# Patient Record
Sex: MALE | Race: WHITE | NOT HISPANIC OR LATINO | Employment: FULL TIME | URBAN - NONMETROPOLITAN AREA
[De-identification: names, ages, dates, MRNs, and addresses within clinical notes are randomized per-mention and may not be internally consistent; named-entity substitution may affect disease eponyms.]

---

## 2017-02-07 RX ORDER — INSULIN LISPRO 100 [IU]/ML
INJECTION, SOLUTION INTRAVENOUS; SUBCUTANEOUS
Qty: 30 ML | Refills: 3 | Status: SHIPPED | OUTPATIENT
Start: 2017-02-07 | End: 2017-03-01

## 2017-02-07 RX ORDER — EMPAGLIFLOZIN 10 MG/1
TABLET, FILM COATED ORAL
Qty: 90 TABLET | Refills: 3 | Status: SHIPPED | OUTPATIENT
Start: 2017-02-07 | End: 2018-01-01 | Stop reason: SDUPTHER

## 2017-02-15 RX ORDER — PEN NEEDLE, DIABETIC 31 GX5/16"
NEEDLE, DISPOSABLE MISCELLANEOUS
Qty: 400 EACH | Refills: 11 | Status: SHIPPED | OUTPATIENT
Start: 2017-02-15 | End: 2018-01-01 | Stop reason: SDUPTHER

## 2017-03-01 ENCOUNTER — OFFICE VISIT (OUTPATIENT)
Dept: ENDOCRINOLOGY | Facility: CLINIC | Age: 57
End: 2017-03-01

## 2017-03-01 VITALS
WEIGHT: 315 LBS | HEART RATE: 94 BPM | BODY MASS INDEX: 45.1 KG/M2 | HEIGHT: 70 IN | DIASTOLIC BLOOD PRESSURE: 86 MMHG | SYSTOLIC BLOOD PRESSURE: 142 MMHG

## 2017-03-01 DIAGNOSIS — Z79.4 CONTROLLED TYPE 2 DIABETES MELLITUS WITHOUT COMPLICATION, WITH LONG-TERM CURRENT USE OF INSULIN (HCC): ICD-10-CM

## 2017-03-01 DIAGNOSIS — E55.9 VITAMIN D DEFICIENCY: ICD-10-CM

## 2017-03-01 DIAGNOSIS — E78.49 OTHER HYPERLIPIDEMIA: Primary | ICD-10-CM

## 2017-03-01 DIAGNOSIS — J06.9 UPPER RESPIRATORY TRACT INFECTION, UNSPECIFIED TYPE: ICD-10-CM

## 2017-03-01 DIAGNOSIS — I10 ESSENTIAL HYPERTENSION: ICD-10-CM

## 2017-03-01 DIAGNOSIS — E11.9 CONTROLLED TYPE 2 DIABETES MELLITUS WITHOUT COMPLICATION, WITH LONG-TERM CURRENT USE OF INSULIN (HCC): ICD-10-CM

## 2017-03-01 PROCEDURE — 99214 OFFICE O/P EST MOD 30 MIN: CPT | Performed by: NURSE PRACTITIONER

## 2017-03-01 RX ORDER — AMOXICILLIN 500 MG/1
500 CAPSULE ORAL 2 TIMES DAILY
Qty: 14 CAPSULE | Refills: 0 | Status: SHIPPED | OUTPATIENT
Start: 2017-03-01 | End: 2017-03-08

## 2017-03-01 RX ORDER — BENZONATATE 100 MG/1
100 CAPSULE ORAL 3 TIMES DAILY PRN
Qty: 60 CAPSULE | Refills: 1 | Status: SHIPPED | OUTPATIENT
Start: 2017-03-01 | End: 2018-01-01

## 2017-03-01 NOTE — PROGRESS NOTES
Subjective    Nolvia Carlin is a 56 y.o. male. he is here today for follow-up.    History of Present Illness       Duration/Timing: Diabetes mellitus type 2, Age at onset of diabetes: 39 years, Onset of symptoms gradual  timing - constant     severity - moderate     Quality - not controlled      Severity (Complications/Hospitalizations)  Secondary Macrovascular Complications: No CAD, No CVA, No PAD  Secondary Microvascular Complications: No Diabetic Nephropathy, No proteinuria, No Diabetic Retinopathy, No Diabetic Neuropathy     Context  Diabetes Regimen: Insulin, Oral Medications, Last HgbA1c% 10.7 from Oct. 2016 , Feb. 2017  A1c 9.8%    Blood Glucose Readings      fasting glucose 170 to 200      No low sugars         Diet  trying to eat low carb    Snacking all the time   Exercise: Does not exercise     Associated Signs/Symptoms  Hypoglycemic Episodes: No documented hypoglycemia        The following portions of the patient's history were reviewed and updated as appropriate:   Past Medical History   Diagnosis Date   • Dyslipidemia    • Elevated blood pressure    • Essential hypertension    • Type II diabetes mellitus, uncontrolled      History reviewed. No pertinent past surgical history.  Family History   Problem Relation Age of Onset   • Diabetes Other    • Thyroid disease Other        Current Outpatient Prescriptions   Medication Sig Dispense Refill   • atenolol (TENORMIN) 50 MG tablet Take 50 mg by mouth Daily.     • B-D ULTRAFINE III SHORT PEN 31G X 8 MM misc USE AS INDICATED FOUR TIMES DAILY 400 each 11   • insulin aspart (NOVOLOG FLEXPEN) 100 UNIT/ML solution pen-injector sc pen Inject 40 Units under the skin 3 (Three) Times a Day Before Meals.     • JARDIANCE 10 MG tablet TAKE 1 TABLET BY MOUTH DAILY BEFORE BREAKFAST 90 tablet 3   • lisinopril-hydrochlorothiazide (PRINZIDE,ZESTORETIC) 20-25 MG per tablet Take 1 tablet by mouth Daily.     • metFORMIN (GLUCOPHAGE) 1000 MG tablet TAKE 1 TABLET 2 TIMES PER  DAY 60 tablet 3   • pravastatin (PRAVACHOL) 40 MG tablet Take 1 tablet by mouth Every Evening. 30 tablet 11   • amoxicillin (AMOXIL) 500 MG capsule Take 1 capsule by mouth 2 (Two) Times a Day for 7 days. 14 capsule 0   • benzonatate (TESSALON PERLES) 100 MG capsule Take 1 capsule by mouth 3 (Three) Times a Day As Needed for cough. 60 capsule 1     No current facility-administered medications for this visit.      No Known Allergies  Social History     Social History   • Marital status:      Spouse name: N/A   • Number of children: N/A   • Years of education: N/A     Social History Main Topics   • Smoking status: Former Smoker   • Smokeless tobacco: None   • Alcohol use None   • Drug use: None   • Sexual activity: Not Asked     Other Topics Concern   • None     Social History Narrative       Review of Systems  Review of Systems   Constitutional: Negative for activity change, appetite change, chills, diaphoresis and fatigue.   HENT: Negative for congestion, dental problem, drooling, ear discharge, ear pain, facial swelling, sneezing, sore throat, tinnitus, trouble swallowing and voice change.    Eyes: Negative for photophobia, pain, discharge, redness, itching and visual disturbance.   Respiratory: Negative for apnea, cough, choking, chest tightness and shortness of breath.    Cardiovascular: Negative for chest pain, palpitations and leg swelling.   Gastrointestinal: Negative for abdominal distention, abdominal pain, constipation, diarrhea, nausea and vomiting.   Endocrine: Negative for cold intolerance, heat intolerance, polydipsia, polyphagia and polyuria.   Genitourinary: Negative for difficulty urinating, dysuria, frequency, hematuria and urgency.   Musculoskeletal: Negative for arthralgias, back pain, gait problem, joint swelling, myalgias, neck pain and neck stiffness.   Skin: Negative for color change, pallor, rash and wound.   Allergic/Immunologic: Negative for environmental allergies, food allergies and  "immunocompromised state.   Neurological: Negative for dizziness, tremors, facial asymmetry, weakness, light-headedness, numbness and headaches.   Hematological: Negative for adenopathy. Does not bruise/bleed easily.   Psychiatric/Behavioral: Negative for agitation, behavioral problems, confusion, decreased concentration and sleep disturbance.        Objective      Visit Vitals   • /86 (BP Location: Right arm, Patient Position: Sitting, Cuff Size: Adult)   • Pulse 94   • Ht 70\" (177.8 cm)   • Wt (!) 326 lb (148 kg)   • BMI 46.78 kg/m2     Physical Exam   Constitutional: He is oriented to person, place, and time. He appears well-developed and well-nourished. No distress.   HENT:   Head: Normocephalic and atraumatic.   Right Ear: External ear normal.   Left Ear: External ear normal.   Nose: Nose normal.   Eyes: Conjunctivae and EOM are normal. Pupils are equal, round, and reactive to light.   Neck: Normal range of motion. Neck supple. No tracheal deviation present. No thyromegaly present.   Cardiovascular: Normal rate, regular rhythm and normal heart sounds.    No murmur heard.  Pulmonary/Chest: Effort normal and breath sounds normal. No respiratory distress. He has no wheezes.   Abdominal: Soft. Bowel sounds are normal. There is no tenderness. There is no rebound and no guarding.   Musculoskeletal: Normal range of motion. He exhibits no edema, tenderness or deformity.   Neurological: He is alert and oriented to person, place, and time. No cranial nerve deficit.   Skin: Skin is warm and dry. No rash noted.   Psychiatric: He has a normal mood and affect. His behavior is normal. Judgment and thought content normal.       Lab Review  No results found for: GLUCOSE, NA, K, CL, CO2, BUN, CREATININE, HGBA1C, TRIG, LDL    Assessment/Plan      1. Other hyperlipidemia    2. Controlled type 2 diabetes mellitus without complication, with long-term current use of insulin    3. Vitamin D deficiency    4. Essential hypertension "    5. Upper respiratory tract infection, unspecified type    .    Medications prescribed:  Outpatient Encounter Prescriptions as of 3/1/2017   Medication Sig Dispense Refill   • atenolol (TENORMIN) 50 MG tablet Take 50 mg by mouth Daily.     • B-D ULTRAFINE III SHORT PEN 31G X 8 MM misc USE AS INDICATED FOUR TIMES DAILY 400 each 11   • insulin aspart (NOVOLOG FLEXPEN) 100 UNIT/ML solution pen-injector sc pen Inject 40 Units under the skin 3 (Three) Times a Day Before Meals.     • JARDIANCE 10 MG tablet TAKE 1 TABLET BY MOUTH DAILY BEFORE BREAKFAST 90 tablet 3   • lisinopril-hydrochlorothiazide (PRINZIDE,ZESTORETIC) 20-25 MG per tablet Take 1 tablet by mouth Daily.     • metFORMIN (GLUCOPHAGE) 1000 MG tablet TAKE 1 TABLET 2 TIMES PER DAY 60 tablet 3   • pravastatin (PRAVACHOL) 40 MG tablet Take 1 tablet by mouth Every Evening. 30 tablet 11   • amoxicillin (AMOXIL) 500 MG capsule Take 1 capsule by mouth 2 (Two) Times a Day for 7 days. 14 capsule 0   • benzonatate (TESSALON PERLES) 100 MG capsule Take 1 capsule by mouth 3 (Three) Times a Day As Needed for cough. 60 capsule 1   • [DISCONTINUED] Dulaglutide (TRULICITY) 1.5 MG/0.5ML solution pen-injector Inject 1.5 mg under the skin 1 (One) Time Per Week.     • [DISCONTINUED] gemfibrozil (LOPID) 600 MG tablet Take 600 mg by mouth Daily.     • [DISCONTINUED] glipiZIDE (GLUCOTROL) 10 MG tablet Take 10 mg by mouth 2 (Two) Times a Day Before Meals.     • [DISCONTINUED] HUMALOG KWIKPEN 100 UNIT/ML solution pen-injector INJECT 40 UNITS BEFORE MEALS THREE TIMES DAILY 30 mL 3   • [DISCONTINUED] Insulin Degludec (TRESIBA FLEXTOUCH) 200 UNIT/ML solution pen-injector Inject 60 Units under the skin Every Night. start with 60 units qhs may increase up to 80 units based on blood glucose.     • [DISCONTINUED] insulin detemir (LEVEMIR FLEXTOUCH) 100 UNIT/ML injection Inject 100 Units under the skin Every Night.     • [DISCONTINUED] simvastatin (ZOCOR) 40 MG tablet Take 40 mg by mouth  Every Night.     • [DISCONTINUED] sitaGLIPtin (JANUVIA) 100 MG tablet Take 100 mg by mouth Daily.       No facility-administered encounter medications on file as of 3/1/2017.        Orders placed during this encounter include:  Orders Placed This Encounter   Procedures   • Comprehensive Metabolic Panel   • Hemoglobin A1c   • Vitamin D 25 Hydroxy             Glycemic Management      Trulicity 0.75 mg weekly -- stopped due to nausea, vomiting      Jardiance 10mg one daily--continue     Metformin 1000 mg po BID --continue         Tresiba 60 units ----increase to 65 units ---        --  Novolog change to Humalog per insurance      Taking 40 units -- change to carb counting 5 units per 15 grams of CHO that you eat ---7 units for every 15 grams of CHO      +     sliding scale     3 per 50    Snacking -- cover with 5 units   .  Lipid Management     Oct 2014     LDL 90  Tg 142  HDL 37     contorlled on zocor 40 mg qhs and lopid 600 bid      hold simvastatin      start pravastatin 20 mg at night     Total chol - 179  Tg - 109  HDL - 37  LDL - 109    Not taking pravastatin    Blood Pressure Management    controlled on lisinopril , hctz 20/25 and atenolol 50     Microvascular Complication Monitoring    last eye exam in 2016-- states needs new exam - will schedule on his           no diabetic neuropathy      Preventive Care: Patient is not smoking  Weight Related: Obesity, Sleep apnea  +OESI  Bone Health  8-15 nl vit D 34    Feb. 2017    Vitamin d     31.5      Other Diabetes Related Aspects  8-15     nl TSH         URI     Taking allergy sinus medication    Amoxil 500 mg one po BID             4. Follow-up: Return in about 3 months (around 6/1/2017) for Recheck.

## 2017-03-06 RX ORDER — INSULIN DEGLUDEC 200 U/ML
INJECTION, SOLUTION SUBCUTANEOUS
Qty: 36 ML | Refills: 3 | Status: SHIPPED | OUTPATIENT
Start: 2017-03-06 | End: 2018-01-01 | Stop reason: SDUPTHER

## 2017-08-02 RX ORDER — INSULIN LISPRO 100 [IU]/ML
INJECTION, SOLUTION INTRAVENOUS; SUBCUTANEOUS
Qty: 30 ML | Refills: 3 | Status: SHIPPED | OUTPATIENT
Start: 2017-08-02 | End: 2018-01-03 | Stop reason: SDUPTHER

## 2017-09-08 ENCOUNTER — TELEPHONE (OUTPATIENT)
Dept: ENDOCRINOLOGY | Facility: CLINIC | Age: 57
End: 2017-09-08

## 2017-09-08 NOTE — TELEPHONE ENCOUNTER
Status   Sent to [facility]   DrugTresiba FlexTouch (insulin degludec injection) 200 Units/mL solution   [employer] Electronic PA Form

## 2017-09-12 ENCOUNTER — TELEPHONE (OUTPATIENT)
Dept: ENDOCRINOLOGY | Facility: CLINIC | Age: 57
End: 2017-09-12

## 2017-11-20 ENCOUNTER — OFFICE VISIT (OUTPATIENT)
Dept: ENDOCRINOLOGY | Facility: CLINIC | Age: 57
End: 2017-11-20

## 2017-11-20 VITALS
HEART RATE: 100 BPM | SYSTOLIC BLOOD PRESSURE: 130 MMHG | BODY MASS INDEX: 45.1 KG/M2 | WEIGHT: 315 LBS | HEIGHT: 70 IN | DIASTOLIC BLOOD PRESSURE: 80 MMHG

## 2017-11-20 DIAGNOSIS — E78.49 OTHER HYPERLIPIDEMIA: ICD-10-CM

## 2017-11-20 DIAGNOSIS — I10 ESSENTIAL HYPERTENSION: ICD-10-CM

## 2017-11-20 DIAGNOSIS — Z79.4 CONTROLLED TYPE 2 DIABETES MELLITUS WITHOUT COMPLICATION, WITH LONG-TERM CURRENT USE OF INSULIN (HCC): Primary | ICD-10-CM

## 2017-11-20 DIAGNOSIS — E11.9 CONTROLLED TYPE 2 DIABETES MELLITUS WITHOUT COMPLICATION, WITH LONG-TERM CURRENT USE OF INSULIN (HCC): Primary | ICD-10-CM

## 2017-11-20 DIAGNOSIS — E55.9 VITAMIN D DEFICIENCY: ICD-10-CM

## 2017-11-20 PROCEDURE — 99214 OFFICE O/P EST MOD 30 MIN: CPT | Performed by: NURSE PRACTITIONER

## 2017-11-20 NOTE — PROGRESS NOTES
Subjective    Nolvia Carlin is a 57 y.o. male. he is here today for follow-up.    History of Present Illness     Duration/Timing: Diabetes mellitus type 2, Age at onset of diabetes: 39 years, Onset of symptoms gradual  timing - constant     severity - moderate     Quality - not controlled      Severity (Complications/Hospitalizations)  Secondary Macrovascular Complications: No CAD, No CVA, No PAD  Secondary Microvascular Complications: No Diabetic Nephropathy, No proteinuria, No Diabetic Retinopathy, No Diabetic Neuropathy     Context  Diabetes Regimen: Insulin, Oral Medications, Last HgbA1c% 10.7 from Oct. 2016 , Feb. 2017  A1c 9.8%    Nov. 2017     9.8%      Blood Glucose Readings        fasting glucose 200 or higher     200 s during the day            Diet    trying to eat low carb     Snacking all the time     Now following with dietician   Exercise: Does not exercise     Associated Signs/Symptoms  Hypoglycemic Episodes: No documented hypoglycemia           The following portions of the patient's history were reviewed and updated as appropriate:   Past Medical History:   Diagnosis Date   • Dyslipidemia    • Elevated blood pressure    • Essential hypertension    • Type II diabetes mellitus, uncontrolled      History reviewed. No pertinent surgical history.  Family History   Problem Relation Age of Onset   • Diabetes Other    • Thyroid disease Other        Current Outpatient Prescriptions   Medication Sig Dispense Refill   • atenolol (TENORMIN) 50 MG tablet Take 50 mg by mouth Daily.     • B-D ULTRAFINE III SHORT PEN 31G X 8 MM misc USE AS INDICATED FOUR TIMES DAILY 400 each 11   • benzonatate (TESSALON PERLES) 100 MG capsule Take 1 capsule by mouth 3 (Three) Times a Day As Needed for cough. 60 capsule 1   • HUMALOG KWIKPEN 100 UNIT/ML solution pen-injector INJECT 40 UNITS BEFORE MEALS THREE TIMES DAILY 30 mL 3   • JARDIANCE 10 MG tablet TAKE 1 TABLET BY MOUTH DAILY BEFORE BREAKFAST 90 tablet 3   •  lisinopril-hydrochlorothiazide (PRINZIDE,ZESTORETIC) 20-25 MG per tablet Take 1 tablet by mouth Daily.     • metFORMIN (GLUCOPHAGE) 1000 MG tablet TAKE 1 TABLET BY MOUTH TWICE DAILY 60 tablet 3   • TRESIBA FLEXTOUCH 200 UNIT/ML solution pen-injector START WITH 60 UNITS AT BEDTIME ; MAY INCREASE UP TO 80 UNITS BASED ON BLOOD GLUCOSE 36 mL 3     No current facility-administered medications for this visit.      No Known Allergies  Social History     Social History   • Marital status:      Spouse name: N/A   • Number of children: N/A   • Years of education: N/A     Social History Main Topics   • Smoking status: Former Smoker   • Smokeless tobacco: Never Used   • Alcohol use No   • Drug use: None   • Sexual activity: Not Asked     Other Topics Concern   • None     Social History Narrative       Review of Systems  Review of Systems   Constitutional: Negative for activity change, appetite change, diaphoresis and fatigue.   HENT: Negative for congestion, dental problem, facial swelling, sneezing, sore throat, tinnitus, trouble swallowing and voice change.    Eyes: Negative for photophobia, pain, discharge, redness, itching and visual disturbance.   Respiratory: Negative for apnea, cough, choking, chest tightness and shortness of breath.    Cardiovascular: Negative for chest pain, palpitations and leg swelling.   Gastrointestinal: Negative for abdominal distention, abdominal pain, constipation, diarrhea, nausea and vomiting.   Endocrine: Negative for cold intolerance, heat intolerance, polydipsia, polyphagia and polyuria.   Genitourinary: Negative for difficulty urinating, dysuria, frequency, hematuria and urgency.   Musculoskeletal: Negative for arthralgias, back pain, gait problem, joint swelling, myalgias, neck pain and neck stiffness.   Skin: Negative for color change, pallor, rash and wound.   Allergic/Immunologic: Negative for environmental allergies and food allergies.   Neurological: Negative for dizziness,  "tremors, facial asymmetry, weakness, light-headedness, numbness and headaches.   Hematological: Negative for adenopathy. Does not bruise/bleed easily.   Psychiatric/Behavioral: Negative for agitation, behavioral problems, confusion and sleep disturbance.        Objective    /80 (BP Location: Right arm, Patient Position: Sitting, Cuff Size: Adult)  Pulse 100  Ht 70\" (177.8 cm)  Wt (!) 325 lb (147 kg)  BMI 46.63 kg/m2  Physical Exam   Constitutional: He is oriented to person, place, and time. He appears well-developed and well-nourished. No distress.   HENT:   Head: Normocephalic and atraumatic.   Right Ear: External ear normal.   Left Ear: External ear normal.   Nose: Nose normal.   Eyes: Conjunctivae and EOM are normal. Pupils are equal, round, and reactive to light.   Neck: Normal range of motion. Neck supple. No tracheal deviation present. No thyromegaly present.   Cardiovascular: Normal rate, regular rhythm and normal heart sounds.    No murmur heard.  Pulmonary/Chest: Effort normal and breath sounds normal. No respiratory distress. He has no wheezes.   Abdominal: Soft. Bowel sounds are normal. There is no tenderness. There is no rebound and no guarding.   Musculoskeletal: Normal range of motion. He exhibits no edema, tenderness or deformity.   Neurological: He is alert and oriented to person, place, and time. No cranial nerve deficit.   Skin: Skin is warm and dry. No rash noted.   Psychiatric: He has a normal mood and affect. His behavior is normal. Judgment and thought content normal.       Lab Review  No results found for: GLUCOSE, NA, K, CL, CO2, BUN, CREATININE, HGBA1C, TRIG, LDL    Assessment/Plan      1. Controlled type 2 diabetes mellitus without complication, with long-term current use of insulin    2. Other hyperlipidemia    3. Vitamin D deficiency    4. Essential hypertension    .    Medications prescribed:  Outpatient Encounter Prescriptions as of 11/20/2017   Medication Sig Dispense Refill "   • atenolol (TENORMIN) 50 MG tablet Take 50 mg by mouth Daily.     • B-D ULTRAFINE III SHORT PEN 31G X 8 MM misc USE AS INDICATED FOUR TIMES DAILY 400 each 11   • benzonatate (TESSALON PERLES) 100 MG capsule Take 1 capsule by mouth 3 (Three) Times a Day As Needed for cough. 60 capsule 1   • HUMALOG KWIKPEN 100 UNIT/ML solution pen-injector INJECT 40 UNITS BEFORE MEALS THREE TIMES DAILY 30 mL 3   • JARDIANCE 10 MG tablet TAKE 1 TABLET BY MOUTH DAILY BEFORE BREAKFAST 90 tablet 3   • lisinopril-hydrochlorothiazide (PRINZIDE,ZESTORETIC) 20-25 MG per tablet Take 1 tablet by mouth Daily.     • metFORMIN (GLUCOPHAGE) 1000 MG tablet TAKE 1 TABLET BY MOUTH TWICE DAILY 60 tablet 3   • TRESIBA FLEXTOUCH 200 UNIT/ML solution pen-injector START WITH 60 UNITS AT BEDTIME ; MAY INCREASE UP TO 80 UNITS BASED ON BLOOD GLUCOSE 36 mL 3   • [DISCONTINUED] insulin aspart (NOVOLOG FLEXPEN) 100 UNIT/ML solution pen-injector sc pen Inject 40 Units under the skin 3 (Three) Times a Day Before Meals.       No facility-administered encounter medications on file as of 11/20/2017.        Orders placed during this encounter include:  No orders of the defined types were placed in this encounter.    Glycemic Management      last HgbA1c 9.8% from November 2017       Trulicity 0.75 mg weekly -- stopped due to nausea, vomiting      Jardiance 10mg one daily--continue     Metformin 1000 mg po BID --continue         Tresiba --  65 units - 75 units         --  Novolog change to Humalog per insurance      Taking 40 units -- change to carb counting 5 units per 15 grams of CHO that you eat ---7 units for every 15 grams of CHO -- 8 units per 15 grams of CHO      +     sliding scale     3 per 50     Snacking -- cover with 5 units     Lipid Management      Oct 2014     LDL 90  Tg 142  HDL 37     contorlled on zocor 40 mg qhs and lopid 600 bid      hold simvastatin      start pravastatin 20 mg at night      Total chol - 179  Tg - 109  HDL - 37  LDL - 109     Not  taking pravastatin     Blood Pressure Management     controlled on lisinopril , hctz 20/25 and atenolol 50      Microvascular Complication Monitoring     last eye exam in 2016-- states needs new exam - will schedule on his            no diabetic neuropathy        Preventive Care:       Patient is not smoking  Weight Related: Obesity, Sleep apnea  +OSEI      Bone Health      8-15 nl vit D 34     Feb. 2017     Vitamin d      31.5        Other Diabetes Related Aspects  8-15     nl TSH                    4. Follow-up: Return in about 3 months (around 2/20/2018) for Recheck.

## 2018-01-01 ENCOUNTER — TELEPHONE (OUTPATIENT)
Dept: ENDOCRINOLOGY | Facility: CLINIC | Age: 58
End: 2018-01-01

## 2018-01-01 ENCOUNTER — TELEPHONE (OUTPATIENT)
Dept: FAMILY MEDICINE CLINIC | Facility: CLINIC | Age: 58
End: 2018-01-01

## 2018-01-01 ENCOUNTER — OFFICE VISIT (OUTPATIENT)
Dept: ENDOCRINOLOGY | Facility: CLINIC | Age: 58
End: 2018-01-01

## 2018-01-01 ENCOUNTER — APPOINTMENT (OUTPATIENT)
Dept: LAB | Facility: HOSPITAL | Age: 58
End: 2018-01-01

## 2018-01-01 VITALS
HEIGHT: 70 IN | WEIGHT: 315 LBS | SYSTOLIC BLOOD PRESSURE: 124 MMHG | DIASTOLIC BLOOD PRESSURE: 60 MMHG | HEART RATE: 121 BPM | BODY MASS INDEX: 45.1 KG/M2

## 2018-01-01 DIAGNOSIS — I10 ESSENTIAL HYPERTENSION: ICD-10-CM

## 2018-01-01 DIAGNOSIS — E55.9 VITAMIN D DEFICIENCY: ICD-10-CM

## 2018-01-01 DIAGNOSIS — E78.49 OTHER HYPERLIPIDEMIA: ICD-10-CM

## 2018-01-01 DIAGNOSIS — E11.9 CONTROLLED TYPE 2 DIABETES MELLITUS WITHOUT COMPLICATION, WITH LONG-TERM CURRENT USE OF INSULIN (HCC): Primary | ICD-10-CM

## 2018-01-01 DIAGNOSIS — Z79.4 CONTROLLED TYPE 2 DIABETES MELLITUS WITHOUT COMPLICATION, WITH LONG-TERM CURRENT USE OF INSULIN (HCC): Primary | ICD-10-CM

## 2018-01-01 LAB
25(OH)D3 SERPL-MCNC: 34.5 NG/ML (ref 30–100)
ALBUMIN SERPL-MCNC: 4.4 G/DL (ref 3.4–4.8)
ALBUMIN UR-MCNC: <0.6 MG/L
ALBUMIN/GLOB SERPL: 1.1 G/DL (ref 1.1–1.8)
ALP SERPL-CCNC: 110 U/L (ref 38–126)
ALT SERPL W P-5'-P-CCNC: 56 U/L (ref 21–72)
ANION GAP SERPL CALCULATED.3IONS-SCNC: 9 MMOL/L (ref 5–15)
ARTICHOKE IGE QN: 174 MG/DL (ref 1–129)
AST SERPL-CCNC: 35 U/L (ref 17–59)
BASOPHILS # BLD AUTO: 0.02 10*3/MM3 (ref 0–0.2)
BASOPHILS NFR BLD AUTO: 0.3 % (ref 0–2)
BILIRUB SERPL-MCNC: 0.6 MG/DL (ref 0.2–1.3)
BUN BLD-MCNC: 14 MG/DL (ref 7–21)
BUN/CREAT SERPL: 18.4 (ref 7–25)
CALCIUM SPEC-SCNC: 10.1 MG/DL (ref 8.4–10.2)
CHLORIDE SERPL-SCNC: 100 MMOL/L (ref 95–110)
CHOLEST SERPL-MCNC: 267 MG/DL (ref 0–199)
CO2 SERPL-SCNC: 31 MMOL/L (ref 22–31)
CREAT BLD-MCNC: 0.76 MG/DL (ref 0.7–1.3)
CREAT UR-MCNC: 64.7 MG/DL
CREAT UR-MCNC: 64.7 MG/DL
DEPRECATED RDW RBC AUTO: 44.3 FL (ref 35.1–43.9)
EOSINOPHIL # BLD AUTO: 0.13 10*3/MM3 (ref 0–0.7)
EOSINOPHIL NFR BLD AUTO: 2 % (ref 0–7)
ERYTHROCYTE [DISTWIDTH] IN BLOOD BY AUTOMATED COUNT: 14 % (ref 11.5–14.5)
GFR SERPL CREATININE-BSD FRML MDRD: 105 ML/MIN/1.73 (ref 56–130)
GLOBULIN UR ELPH-MCNC: 4 GM/DL (ref 2.3–3.5)
GLUCOSE BLD-MCNC: 163 MG/DL (ref 60–100)
HBA1C MFR BLD: 11.1 % (ref 4–5.6)
HCT VFR BLD AUTO: 47.9 % (ref 39–49)
HDLC SERPL-MCNC: 48 MG/DL (ref 60–200)
HGB BLD-MCNC: 16.2 G/DL (ref 13.7–17.3)
IMM GRANULOCYTES # BLD: 0.01 10*3/MM3 (ref 0–0.02)
IMM GRANULOCYTES NFR BLD: 0.2 % (ref 0–0.5)
LDLC/HDLC SERPL: 3.46 {RATIO} (ref 0–3.55)
LYMPHOCYTES # BLD AUTO: 2.35 10*3/MM3 (ref 0.6–4.2)
LYMPHOCYTES NFR BLD AUTO: 36.1 % (ref 10–50)
MCH RBC QN AUTO: 29.5 PG (ref 26.5–34)
MCHC RBC AUTO-ENTMCNC: 33.8 G/DL (ref 31.5–36.3)
MCV RBC AUTO: 87.2 FL (ref 80–98)
MICROALBUMIN/CREAT UR: NORMAL MG/G (ref 0–30)
MONOCYTES # BLD AUTO: 0.36 10*3/MM3 (ref 0–0.9)
MONOCYTES NFR BLD AUTO: 5.5 % (ref 0–12)
NEUTROPHILS # BLD AUTO: 3.64 10*3/MM3 (ref 2–8.6)
NEUTROPHILS NFR BLD AUTO: 55.9 % (ref 37–80)
PLATELET # BLD AUTO: 170 10*3/MM3 (ref 150–450)
PMV BLD AUTO: 9.7 FL (ref 8–12)
POTASSIUM BLD-SCNC: 3.9 MMOL/L (ref 3.5–5.1)
PROT SERPL-MCNC: 8.4 G/DL (ref 6.3–8.6)
PROT UR-MCNC: 9.3 MG/DL
PROT/CREAT UR: 143.7 MG/G CREA (ref 0–200)
RBC # BLD AUTO: 5.49 10*6/MM3 (ref 4.37–5.74)
SODIUM BLD-SCNC: 140 MMOL/L (ref 137–145)
TRIGL SERPL-MCNC: 264 MG/DL (ref 20–199)
TSH SERPL DL<=0.05 MIU/L-ACNC: 1.84 MIU/ML (ref 0.46–4.68)
VIT B12 BLD-MCNC: 667 PG/ML (ref 239–931)
WBC NRBC COR # BLD: 6.51 10*3/MM3 (ref 3.2–9.8)

## 2018-01-01 PROCEDURE — 82306 VITAMIN D 25 HYDROXY: CPT | Performed by: NURSE PRACTITIONER

## 2018-01-01 PROCEDURE — 83036 HEMOGLOBIN GLYCOSYLATED A1C: CPT | Performed by: NURSE PRACTITIONER

## 2018-01-01 PROCEDURE — 82043 UR ALBUMIN QUANTITATIVE: CPT | Performed by: NURSE PRACTITIONER

## 2018-01-01 PROCEDURE — 99214 OFFICE O/P EST MOD 30 MIN: CPT | Performed by: NURSE PRACTITIONER

## 2018-01-01 PROCEDURE — 80061 LIPID PANEL: CPT | Performed by: NURSE PRACTITIONER

## 2018-01-01 PROCEDURE — 82570 ASSAY OF URINE CREATININE: CPT | Performed by: NURSE PRACTITIONER

## 2018-01-01 PROCEDURE — 80053 COMPREHEN METABOLIC PANEL: CPT | Performed by: NURSE PRACTITIONER

## 2018-01-01 PROCEDURE — 84443 ASSAY THYROID STIM HORMONE: CPT | Performed by: NURSE PRACTITIONER

## 2018-01-01 PROCEDURE — 85025 COMPLETE CBC W/AUTO DIFF WBC: CPT | Performed by: NURSE PRACTITIONER

## 2018-01-01 PROCEDURE — 36415 COLL VENOUS BLD VENIPUNCTURE: CPT | Performed by: NURSE PRACTITIONER

## 2018-01-01 PROCEDURE — 82607 VITAMIN B-12: CPT | Performed by: NURSE PRACTITIONER

## 2018-01-01 PROCEDURE — 95250 CONT GLUC MNTR PHYS/QHP EQP: CPT | Performed by: NURSE PRACTITIONER

## 2018-01-01 PROCEDURE — 84156 ASSAY OF PROTEIN URINE: CPT | Performed by: NURSE PRACTITIONER

## 2018-01-01 RX ORDER — INSULIN LISPRO 100 [IU]/ML
INJECTION, SOLUTION INTRAVENOUS; SUBCUTANEOUS
Qty: 30 ML | Refills: 3 | Status: SHIPPED | OUTPATIENT
Start: 2018-01-01 | End: 2018-01-01 | Stop reason: SDUPTHER

## 2018-01-01 RX ORDER — INSULIN DEGLUDEC 200 U/ML
INJECTION, SOLUTION SUBCUTANEOUS
Qty: 36 ML | Refills: 3 | Status: SHIPPED | OUTPATIENT
Start: 2018-01-01 | End: 2018-01-01 | Stop reason: SDUPTHER

## 2018-01-01 RX ORDER — PEN NEEDLE, DIABETIC 31 GX5/16"
NEEDLE, DISPOSABLE MISCELLANEOUS
Qty: 150 EACH | Refills: 11 | Status: SHIPPED | OUTPATIENT
Start: 2018-01-01 | End: 2018-01-01 | Stop reason: SDUPTHER

## 2018-01-01 RX ORDER — EMPAGLIFLOZIN 10 MG/1
TABLET, FILM COATED ORAL
Qty: 90 TABLET | Refills: 3 | Status: SHIPPED | OUTPATIENT
Start: 2018-01-01 | End: 2018-01-01 | Stop reason: SDUPTHER

## 2018-01-01 RX ORDER — ROSUVASTATIN CALCIUM 5 MG/1
5 TABLET, COATED ORAL NIGHTLY
Qty: 30 TABLET | Refills: 5 | Status: ON HOLD | OUTPATIENT
Start: 2018-01-01 | End: 2019-01-01

## 2018-01-03 RX ORDER — INSULIN LISPRO 100 [IU]/ML
INJECTION, SOLUTION INTRAVENOUS; SUBCUTANEOUS
Qty: 30 ML | Refills: 3 | Status: SHIPPED | OUTPATIENT
Start: 2018-01-03 | End: 2018-01-01 | Stop reason: SDUPTHER

## 2018-10-29 NOTE — TELEPHONE ENCOUNTER
----- Message from LUIS E Connors sent at 10/29/2018  3:32 PM CDT -----  A1c was 11.1 so average is 275 increase the mealtime like we discussed today; b12, vitamin d and thyroid normal; no excessive protein spillage in the urine; bad cholesterol is 174 should be 70 or less does he think he can tolerate a different cholesterol pill?

## 2018-10-29 NOTE — PROGRESS NOTES
Subjective    Nolvia Carlin is a 58 y.o. male. he is here today for follow-up.    History of Present Illness     Duration/Timing: Diabetes mellitus type 2, Age at onset of diabetes: 39 years, Onset of symptoms gradual  timing - constant     severity - moderate     Quality - not controlled      Severity (Complications/Hospitalizations)  Secondary Macrovascular Complications: No CAD, No CVA, No PAD  Secondary Microvascular Complications: No Diabetic Nephropathy, No proteinuria, No Diabetic Retinopathy, No Diabetic Neuropathy     Context  Diabetes Regimen: Insulin, Oral Medications     Nov. 2017      9.8%      Blood Glucose Readings        Am fasting in 200    During the day 170 up to 200     No low            Diet     trying to eat low carb     Snacking all the time      Now following with dietician   Exercise: Does not exercise     Associated Signs/Symptoms  Hypoglycemic Episodes: No documented hypoglycemia            The following portions of the patient's history were reviewed and updated as appropriate:   Past Medical History:   Diagnosis Date   • Dyslipidemia    • Elevated blood pressure    • Essential hypertension    • Type II diabetes mellitus, uncontrolled (CMS/[facility])      History reviewed. No pertinent surgical history.  Family History   Problem Relation Age of Onset   • Diabetes Other    • Thyroid disease Other        Current Outpatient Prescriptions   Medication Sig Dispense Refill   • Empagliflozin (JARDIANCE) 10 MG tablet Take 10 mg by mouth Daily. 30 tablet 11   • Insulin Degludec (TRESIBA FLEXTOUCH) 200 UNIT/ML solution pen-injector Inject 80 Units under the skin into the appropriate area as directed Daily. 8 pen 11   • Insulin Lispro (HUMALOG KWIKPEN) 100 UNIT/ML solution pen-injector Inject 40 Units under the skin into the appropriate area as directed 3 (Three) Times a Day. 4 pen 11   • Insulin Pen Needle (B-D ULTRAFINE III SHORT PEN) 31G X 8 MM misc Inject 4 times daily 150 each 11   •  lisinopril-hydrochlorothiazide (PRINZIDE,ZESTORETIC) 20-25 MG per tablet Take 1 tablet by mouth Daily.     • metFORMIN (GLUCOPHAGE) 1000 MG tablet Take 1 tablet by mouth 2 (Two) Times a Day. 60 tablet 11   • atenolol (TENORMIN) 50 MG tablet Take 50 mg by mouth Daily.       No current facility-administered medications for this visit.      No Known Allergies  Social History     Social History   • Marital status:      Social History Main Topics   • Smoking status: Former Smoker   • Smokeless tobacco: Never Used   • Alcohol use No   • Drug use: Unknown     Other Topics Concern   • Not on file       Review of Systems  Review of Systems   Constitutional: Negative for activity change, appetite change, diaphoresis and fatigue.   HENT: Negative for facial swelling, sneezing, sore throat, tinnitus, trouble swallowing and voice change.    Eyes: Negative for photophobia, pain, discharge, redness, itching and visual disturbance.   Respiratory: Negative for apnea, cough, choking, chest tightness and shortness of breath.    Cardiovascular: Negative for chest pain, palpitations and leg swelling.   Gastrointestinal: Negative for abdominal distention, abdominal pain, constipation, diarrhea, nausea and vomiting.   Endocrine: Negative for cold intolerance, heat intolerance, polydipsia, polyphagia and polyuria.   Genitourinary: Negative for difficulty urinating, dysuria, frequency, hematuria and urgency.   Musculoskeletal: Negative for arthralgias, back pain, gait problem, joint swelling, myalgias, neck pain and neck stiffness.   Skin: Negative for color change, pallor, rash and wound.   Neurological: Negative for dizziness, tremors, weakness, light-headedness, numbness and headaches.   Hematological: Negative for adenopathy. Does not bruise/bleed easily.   Psychiatric/Behavioral: Negative for behavioral problems, confusion and sleep disturbance.        Objective    /60 (BP Location: Right arm, Patient Position: Sitting,  "Cuff Size: Adult)   Pulse (!) 121   Ht 177.8 cm (70\")   Wt (!) 149 kg (329 lb)   BMI 47.21 kg/m²   Physical Exam   Constitutional: He is oriented to person, place, and time. He appears well-developed and well-nourished. No distress.   HENT:   Head: Normocephalic and atraumatic.   Right Ear: External ear normal.   Left Ear: External ear normal.   Nose: Nose normal.   Eyes: Pupils are equal, round, and reactive to light. Conjunctivae and EOM are normal.   Neck: Normal range of motion. Neck supple. No tracheal deviation present. No thyromegaly present.   Cardiovascular: Normal rate, regular rhythm and normal heart sounds.    No murmur heard.  Pulmonary/Chest: Effort normal and breath sounds normal. No respiratory distress. He has no wheezes.   Abdominal: Soft. Bowel sounds are normal. There is no tenderness. There is no rebound and no guarding.   Musculoskeletal: Normal range of motion. He exhibits no edema, tenderness or deformity.   Neurological: He is alert and oriented to person, place, and time. No cranial nerve deficit.   Skin: Skin is warm and dry. No rash noted.   Psychiatric: He has a normal mood and affect. His behavior is normal. Judgment and thought content normal.       Lab Review  No results found for: GLUCOSE, NA, K, CL, CO2, BUN, CREATININE, HGBA1C, TRIG, LDL    Assessment/Plan      1. Controlled type 2 diabetes mellitus without complication, with long-term current use of insulin (CMS/[facility])    2. Other hyperlipidemia    3. Vitamin D deficiency    4. Essential hypertension    .    Medications prescribed:  Outpatient Encounter Prescriptions as of 10/29/2018   Medication Sig Dispense Refill   • Empagliflozin (JARDIANCE) 10 MG tablet Take 10 mg by mouth Daily. 30 tablet 11   • Insulin Degludec (TRESIBA FLEXTOUCH) 200 UNIT/ML solution pen-injector Inject 80 Units under the skin into the appropriate area as directed Daily. 8 pen 11   • Insulin Lispro (HUMALOG KWIKPEN) 100 UNIT/ML solution pen-injector " Inject 40 Units under the skin into the appropriate area as directed 3 (Three) Times a Day. 4 pen 11   • Insulin Pen Needle (B-D ULTRAFINE III SHORT PEN) 31G X 8 MM misc Inject 4 times daily 150 each 11   • lisinopril-hydrochlorothiazide (PRINZIDE,ZESTORETIC) 20-25 MG per tablet Take 1 tablet by mouth Daily.     • metFORMIN (GLUCOPHAGE) 1000 MG tablet Take 1 tablet by mouth 2 (Two) Times a Day. 60 tablet 11   • [DISCONTINUED] B-D ULTRAFINE III SHORT PEN 31G X 8 MM misc USE AS INDICATED FOUR TIMES DAILY 150 each 11   • [DISCONTINUED] HUMALOG KWIKPEN 100 UNIT/ML solution pen-injector INJECT 40 UNITS BEFORE MEALS THREE TIMES DAILY 30 mL 3   • [DISCONTINUED] JARDIANCE 10 MG tablet TAKE 1 TABLET BY MOUTH DAILY BEFORE BREAKFAST 90 tablet 3   • [DISCONTINUED] metFORMIN (GLUCOPHAGE) 1000 MG tablet TAKE 1 TABLET BY MOUTH TWICE DAILY 60 tablet 0   • [DISCONTINUED] TRESIBA FLEXTOUCH 200 UNIT/ML solution pen-injector START WITH 60 UNITS AT BEDTIME ; MAY INCREASE UP TO 80 UNITS BASED ON BLOOD GLUCOSE 36 mL 3   • atenolol (TENORMIN) 50 MG tablet Take 50 mg by mouth Daily.       No facility-administered encounter medications on file as of 10/29/2018.        Orders placed during this encounter include:  Orders Placed This Encounter   Procedures   • Comprehensive Metabolic Panel   • Hemoglobin A1c   • Vitamin D 25 Hydroxy   • TSH   • Vitamin B12   • Protein / Creatinine Ratio, Urine - Urine, Clean Catch   • Microalbumin / Creatinine Urine Ratio - Urine, Clean Catch   • Lipid Panel   • CBC & Differential     Order Specific Question:   Manual Differential     Answer:   No     Glycemic Management      last HgbA1c 9.8% from November 2017         Trulicity 0.75 mg weekly -- stopped due to nausea, vomiting      Jardiance 10mg one daily--continue     Metformin 1000 mg po BID --continue         Tresiba -- 80 units         --   Humalog     Taking 15 units -- increase to 20 units     Add 5 units for higher carb meal      +     sliding scale      3 per 50     Snacking -- cover with 5 units           Uncontrolled diabetes  Hyperglycemia    Insertion of continuous glucose monitor to define patter    The continuous glucose monitor that was inserted is a suzi glucose monitor     Lipid Management      States cannot take statins -- pravastatin, zocor, or lipid -- caused sever leg pain      Total chol - 179  Tg - 109  HDL - 37  LDL - 109          Blood Pressure Management      lisinopril     HCTZ       Off atenolol         Microvascular Complication Monitoring     last eye exam in 2017-- states needs new exam - will schedule on his            no diabetic neuropathy        Preventive Care:         Patient is not smoking  Weight Related: Obesity, Sleep apnea  +OSEI        Bone Health        8-15 nl vit D 34     Feb. 2017     Vitamin d      31.5        Other Diabetes Related Aspects  8-15     nl TSH         4. Follow-up: Return in about 3 months (around 1/29/2019) for Recheck.

## 2018-10-30 NOTE — TELEPHONE ENCOUNTER
----- Message from LUIS E Connors sent at 10/30/2018  7:55 AM CDT -----  Try crestor 5mg and let me know if he cannot tolerate it

## 2018-11-06 NOTE — TELEPHONE ENCOUNTER
----- Message from LUIS E Connors sent at 11/6/2018  1:54 PM CST -----  Let him know the 3 days it recorded showed an average of 171; there were no low events -- not enough information to make changes

## 2018-11-21 NOTE — TELEPHONE ENCOUNTER
HEIDI WHEELER (Key: [id number])   Rx #: [id number]   Tresiba FlexTouch (insulin degludec injection) 200 Units/mL solution   Form  [place] [employer] Form   Created   6 hours ago   Sent to Plan   1 minute ago   Plan Response   1 minute ago   Submit Clinical Questions   now   Determination   Favorable   now   Message from Plan  Questionnaire submitted. PA Case [id number] Status: Approved. Authorization and Notifications Completed.

## 2018-12-10 NOTE — TELEPHONE ENCOUNTER
He called and said that Jamshid increased his humolog and now he is running out of it too soon- about 4 -5 days early and ins doesn't want to pay for it . He didn't know what he needs to do -can be reached at [phone]

## 2018-12-10 NOTE — TELEPHONE ENCOUNTER
He called and said that Jamshid increased his humolog and now he is running out of it too soon- about 4 -5 days early and ins doesn't want to pay for it . He didn't know what he needs to do -can be reached at [phone]

## 2019-01-01 ENCOUNTER — APPOINTMENT (OUTPATIENT)
Dept: GENERAL RADIOLOGY | Facility: HOSPITAL | Age: 59
End: 2019-01-01

## 2019-01-01 ENCOUNTER — APPOINTMENT (OUTPATIENT)
Dept: CT IMAGING | Facility: HOSPITAL | Age: 59
End: 2019-01-01

## 2019-01-01 ENCOUNTER — HOSPITAL ENCOUNTER (INPATIENT)
Facility: HOSPITAL | Age: 59
LOS: 10 days | End: 2019-01-25
Attending: FAMILY MEDICINE | Admitting: INTERNAL MEDICINE

## 2019-01-01 VITALS
TEMPERATURE: 99.3 F | SYSTOLIC BLOOD PRESSURE: 125 MMHG | BODY MASS INDEX: 46.65 KG/M2 | DIASTOLIC BLOOD PRESSURE: 59 MMHG | RESPIRATION RATE: 23 BRPM | HEIGHT: 69 IN | OXYGEN SATURATION: 81 % | WEIGHT: 315 LBS

## 2019-01-01 DIAGNOSIS — A41.9 SHOCK, SEPTIC (HCC): Primary | ICD-10-CM

## 2019-01-01 DIAGNOSIS — R65.21 SHOCK, SEPTIC (HCC): Primary | ICD-10-CM

## 2019-01-01 DIAGNOSIS — Z74.09 IMPAIRED MOBILITY: ICD-10-CM

## 2019-01-01 LAB
ALBUMIN FLD-MCNC: 2.2 G/DL
ALBUMIN SERPL-MCNC: 2.3 G/DL (ref 3.5–5)
ALBUMIN SERPL-MCNC: 2.5 G/DL (ref 3.5–5)
ALBUMIN SERPL-MCNC: 2.6 G/DL (ref 3.5–5)
ALBUMIN SERPL-MCNC: 2.7 G/DL (ref 3.5–5)
ALBUMIN SERPL-MCNC: 2.7 G/DL (ref 3.5–5)
ALBUMIN SERPL-MCNC: 2.8 G/DL (ref 3.5–5)
ALBUMIN SERPL-MCNC: 3.4 G/DL (ref 3.5–5)
ALBUMIN SERPL-MCNC: 3.4 G/DL (ref 3.5–5)
ALBUMIN/GLOB SERPL: 0.7 G/DL (ref 1.1–2.5)
ALBUMIN/GLOB SERPL: 0.8 G/DL (ref 1.1–2.5)
ALBUMIN/GLOB SERPL: 0.9 G/DL (ref 1.1–2.5)
ALP SERPL-CCNC: 105 U/L (ref 24–120)
ALP SERPL-CCNC: 115 U/L (ref 24–120)
ALP SERPL-CCNC: 116 U/L (ref 24–120)
ALP SERPL-CCNC: 74 U/L (ref 24–120)
ALP SERPL-CCNC: 76 U/L (ref 24–120)
ALP SERPL-CCNC: 77 U/L (ref 24–120)
ALT SERPL W P-5'-P-CCNC: 24 U/L (ref 0–54)
ALT SERPL W P-5'-P-CCNC: 27 U/L (ref 0–54)
ALT SERPL W P-5'-P-CCNC: 28 U/L (ref 0–54)
ALT SERPL W P-5'-P-CCNC: 30 U/L (ref 0–54)
ALT SERPL W P-5'-P-CCNC: 32 U/L (ref 0–54)
ALT SERPL W P-5'-P-CCNC: 34 U/L (ref 0–54)
ANION GAP SERPL CALCULATED.3IONS-SCNC: 10 MMOL/L (ref 4–13)
ANION GAP SERPL CALCULATED.3IONS-SCNC: 10 MMOL/L (ref 4–13)
ANION GAP SERPL CALCULATED.3IONS-SCNC: 13 MMOL/L (ref 4–13)
ANION GAP SERPL CALCULATED.3IONS-SCNC: 13 MMOL/L (ref 4–13)
ANION GAP SERPL CALCULATED.3IONS-SCNC: 5 MMOL/L (ref 4–13)
ANION GAP SERPL CALCULATED.3IONS-SCNC: 5 MMOL/L (ref 4–13)
ANION GAP SERPL CALCULATED.3IONS-SCNC: 6 MMOL/L (ref 4–13)
ANION GAP SERPL CALCULATED.3IONS-SCNC: 6 MMOL/L (ref 4–13)
ANION GAP SERPL CALCULATED.3IONS-SCNC: 7 MMOL/L (ref 4–13)
ANION GAP SERPL CALCULATED.3IONS-SCNC: 9 MMOL/L (ref 4–13)
APPEARANCE FLD: ABNORMAL
APTT PPP: 39.2 SECONDS (ref 24.1–34.8)
ARTERIAL PATENCY WRIST A: ABNORMAL
ARTERIAL PATENCY WRIST A: POSITIVE
AST SERPL-CCNC: 26 U/L (ref 7–45)
AST SERPL-CCNC: 31 U/L (ref 7–45)
AST SERPL-CCNC: 35 U/L (ref 7–45)
AST SERPL-CCNC: 42 U/L (ref 7–45)
AST SERPL-CCNC: 42 U/L (ref 7–45)
AST SERPL-CCNC: 52 U/L (ref 7–45)
ATMOSPHERIC PRESS: 740 MMHG
ATMOSPHERIC PRESS: 742 MMHG
ATMOSPHERIC PRESS: 745 MMHG
ATMOSPHERIC PRESS: 746 MMHG
ATMOSPHERIC PRESS: 746 MMHG
ATMOSPHERIC PRESS: 750 MMHG
ATMOSPHERIC PRESS: 752 MMHG
ATMOSPHERIC PRESS: 754 MMHG
ATMOSPHERIC PRESS: 754 MMHG
ATMOSPHERIC PRESS: 755 MMHG
ATMOSPHERIC PRESS: 756 MMHG
ATMOSPHERIC PRESS: 757 MMHG
ATMOSPHERIC PRESS: 758 MMHG
ATMOSPHERIC PRESS: 758 MMHG
ATMOSPHERIC PRESS: 759 MMHG
ATMOSPHERIC PRESS: 761 MMHG
BACTERIA FLD CULT: NORMAL
BACTERIA SPEC AEROBE CULT: NORMAL
BACTERIA SPEC AEROBE CULT: NORMAL
BACTERIA SPEC ANAEROBE CULT: NORMAL
BACTERIA SPEC RESP CULT: ABNORMAL
BACTERIA SPEC RESP CULT: ABNORMAL
BACTERIA UR QL AUTO: ABNORMAL /HPF
BASE EXCESS BLDA CALC-SCNC: 2.7 MMOL/L (ref 0–2)
BASE EXCESS BLDA CALC-SCNC: 4.2 MMOL/L (ref 0–2)
BASE EXCESS BLDA CALC-SCNC: 4.3 MMOL/L (ref 0–2)
BASE EXCESS BLDA CALC-SCNC: 4.6 MMOL/L (ref 0–2)
BASE EXCESS BLDA CALC-SCNC: 4.6 MMOL/L (ref 0–2)
BASE EXCESS BLDA CALC-SCNC: 4.7 MMOL/L (ref 0–2)
BASE EXCESS BLDA CALC-SCNC: 5.5 MMOL/L (ref 0–2)
BASE EXCESS BLDA CALC-SCNC: 5.6 MMOL/L (ref 0–2)
BASE EXCESS BLDA CALC-SCNC: 5.6 MMOL/L (ref 0–2)
BASE EXCESS BLDA CALC-SCNC: 5.7 MMOL/L (ref 0–2)
BASE EXCESS BLDA CALC-SCNC: 6 MMOL/L (ref 0–2)
BASE EXCESS BLDA CALC-SCNC: 6.2 MMOL/L (ref 0–2)
BASE EXCESS BLDA CALC-SCNC: 6.5 MMOL/L (ref 0–2)
BASE EXCESS BLDA CALC-SCNC: 7.2 MMOL/L (ref 0–2)
BASE EXCESS BLDA CALC-SCNC: 7.8 MMOL/L (ref 0–2)
BASE EXCESS BLDA CALC-SCNC: 8 MMOL/L (ref 0–2)
BASE EXCESS BLDA CALC-SCNC: 8.4 MMOL/L (ref 0–2)
BASE EXCESS BLDA CALC-SCNC: 8.9 MMOL/L (ref 0–2)
BASO STIPL COARSE BLD QL SMEAR: ABNORMAL
BASOPHILS # BLD AUTO: 0.14 10*3/MM3 (ref 0–0.2)
BASOPHILS NFR BLD AUTO: 0.6 % (ref 0–2)
BDY SITE: ABNORMAL
BILIRUB SERPL-MCNC: 0.3 MG/DL (ref 0.1–1)
BILIRUB SERPL-MCNC: 0.4 MG/DL (ref 0.1–1)
BILIRUB SERPL-MCNC: 0.4 MG/DL (ref 0.1–1)
BILIRUB SERPL-MCNC: 0.5 MG/DL (ref 0.1–1)
BILIRUB SERPL-MCNC: 0.5 MG/DL (ref 0.1–1)
BILIRUB SERPL-MCNC: 0.7 MG/DL (ref 0.1–1)
BILIRUB UR QL STRIP: NEGATIVE
BODY TEMPERATURE: 37 C
BUN BLD-MCNC: 31 MG/DL (ref 5–21)
BUN BLD-MCNC: 41 MG/DL (ref 5–21)
BUN BLD-MCNC: 56 MG/DL (ref 5–21)
BUN BLD-MCNC: 63 MG/DL (ref 5–21)
BUN BLD-MCNC: 66 MG/DL (ref 5–21)
BUN BLD-MCNC: 67 MG/DL (ref 5–21)
BUN BLD-MCNC: 67 MG/DL (ref 5–21)
BUN BLD-MCNC: 70 MG/DL (ref 5–21)
BUN BLD-MCNC: 72 MG/DL (ref 5–21)
BUN BLD-MCNC: 75 MG/DL (ref 5–21)
BUN BLD-MCNC: 76 MG/DL (ref 5–21)
BUN BLD-MCNC: 86 MG/DL (ref 5–21)
BUN/CREAT SERPL: 37.4 (ref 7–25)
BUN/CREAT SERPL: 42.5 (ref 7–25)
BUN/CREAT SERPL: 47.9 (ref 7–25)
BUN/CREAT SERPL: 50.9 (ref 7–25)
BUN/CREAT SERPL: 54.7 (ref 7–25)
BUN/CREAT SERPL: 63.6 (ref 7–25)
BUN/CREAT SERPL: 68.8 (ref 7–25)
BUN/CREAT SERPL: 71 (ref 7–25)
BUN/CREAT SERPL: 72 (ref 7–25)
BUN/CREAT SERPL: 74.1 (ref 7–25)
BUN/CREAT SERPL: 75 (ref 7–25)
BUN/CREAT SERPL: 82.5 (ref 7–25)
CALCIUM SPEC-SCNC: 8.5 MG/DL (ref 8.4–10.4)
CALCIUM SPEC-SCNC: 8.6 MG/DL (ref 8.4–10.4)
CALCIUM SPEC-SCNC: 8.6 MG/DL (ref 8.4–10.4)
CALCIUM SPEC-SCNC: 8.8 MG/DL (ref 8.4–10.4)
CALCIUM SPEC-SCNC: 8.9 MG/DL (ref 8.4–10.4)
CALCIUM SPEC-SCNC: 9.1 MG/DL (ref 8.4–10.4)
CALCIUM SPEC-SCNC: 9.1 MG/DL (ref 8.4–10.4)
CHLORIDE SERPL-SCNC: 101 MMOL/L (ref 98–110)
CHLORIDE SERPL-SCNC: 102 MMOL/L (ref 98–110)
CHLORIDE SERPL-SCNC: 102 MMOL/L (ref 98–110)
CHLORIDE SERPL-SCNC: 104 MMOL/L (ref 98–110)
CHLORIDE SERPL-SCNC: 105 MMOL/L (ref 98–110)
CHLORIDE SERPL-SCNC: 105 MMOL/L (ref 98–110)
CHLORIDE SERPL-SCNC: 107 MMOL/L (ref 98–110)
CHLORIDE SERPL-SCNC: 107 MMOL/L (ref 98–110)
CHLORIDE SERPL-SCNC: 89 MMOL/L (ref 98–110)
CHLORIDE SERPL-SCNC: 91 MMOL/L (ref 98–110)
CHLORIDE SERPL-SCNC: 98 MMOL/L (ref 98–110)
CHLORIDE SERPL-SCNC: 99 MMOL/L (ref 98–110)
CLARITY UR: CLEAR
CLUMPED PLATELETS: PRESENT
CO2 SERPL-SCNC: 30 MMOL/L (ref 24–31)
CO2 SERPL-SCNC: 31 MMOL/L (ref 24–31)
CO2 SERPL-SCNC: 31 MMOL/L (ref 24–31)
CO2 SERPL-SCNC: 32 MMOL/L (ref 24–31)
CO2 SERPL-SCNC: 32 MMOL/L (ref 24–31)
CO2 SERPL-SCNC: 33 MMOL/L (ref 24–31)
COLOR FLD: YELLOW
COLOR UR: YELLOW
CREAT BLD-MCNC: 0.73 MG/DL (ref 0.5–1.4)
CREAT BLD-MCNC: 0.75 MG/DL (ref 0.5–1.4)
CREAT BLD-MCNC: 0.8 MG/DL (ref 0.5–1.4)
CREAT BLD-MCNC: 0.93 MG/DL (ref 0.5–1.4)
CREAT BLD-MCNC: 0.96 MG/DL (ref 0.5–1.4)
CREAT BLD-MCNC: 0.99 MG/DL (ref 0.5–1.4)
CREAT BLD-MCNC: 1.07 MG/DL (ref 0.5–1.4)
CREAT BLD-MCNC: 1.09 MG/DL (ref 0.5–1.4)
CREAT BLD-MCNC: 1.1 MG/DL (ref 0.5–1.4)
CREAT BLD-MCNC: 1.16 MG/DL (ref 0.5–1.4)
CREAT BLD-MCNC: 1.4 MG/DL (ref 0.5–1.4)
CREAT BLD-MCNC: 1.87 MG/DL (ref 0.5–1.4)
CREAT UR-MCNC: 52.2 MG/DL
CYTO UR: NORMAL
D-LACTATE SERPL-SCNC: 1.9 MMOL/L (ref 0.5–2)
D-LACTATE SERPL-SCNC: 2 MMOL/L (ref 0.5–2)
DEPRECATED RDW RBC AUTO: 44.5 FL (ref 40–54)
DEPRECATED RDW RBC AUTO: 46.5 FL (ref 40–54)
DEPRECATED RDW RBC AUTO: 46.5 FL (ref 40–54)
DEPRECATED RDW RBC AUTO: 46.8 FL (ref 40–54)
DEPRECATED RDW RBC AUTO: 46.8 FL (ref 40–54)
DEPRECATED RDW RBC AUTO: 47.1 FL (ref 40–54)
DEPRECATED RDW RBC AUTO: 47.7 FL (ref 40–54)
DEPRECATED RDW RBC AUTO: 47.8 FL (ref 40–54)
DEPRECATED RDW RBC AUTO: 48 FL (ref 40–54)
DEPRECATED RDW RBC AUTO: 48.2 FL (ref 40–54)
DEPRECATED RDW RBC AUTO: 48.9 FL (ref 40–54)
DOHLE BODIES: PRESENT
EOSINOPHIL # BLD AUTO: 0 10*3/MM3 (ref 0–0.7)
EOSINOPHIL # BLD MANUAL: 0.8 10*3/MM3 (ref 0–0.7)
EOSINOPHIL NFR BLD AUTO: 0 % (ref 0–4)
EOSINOPHIL NFR BLD MANUAL: 5 % (ref 0–4)
EOSINOPHIL NFR FLD MANUAL: 3 %
ERYTHROCYTE [DISTWIDTH] IN BLOOD BY AUTOMATED COUNT: 14.4 % (ref 12–15)
ERYTHROCYTE [DISTWIDTH] IN BLOOD BY AUTOMATED COUNT: 14.5 % (ref 12–15)
ERYTHROCYTE [DISTWIDTH] IN BLOOD BY AUTOMATED COUNT: 14.6 % (ref 12–15)
ERYTHROCYTE [DISTWIDTH] IN BLOOD BY AUTOMATED COUNT: 14.7 % (ref 12–15)
ERYTHROCYTE [DISTWIDTH] IN BLOOD BY AUTOMATED COUNT: 14.8 % (ref 12–15)
ERYTHROCYTE [DISTWIDTH] IN BLOOD BY AUTOMATED COUNT: 14.8 % (ref 12–15)
GFR SERPL CREATININE-BSD FRML MDRD: 107 ML/MIN/1.73
GFR SERPL CREATININE-BSD FRML MDRD: 110 ML/MIN/1.73
GFR SERPL CREATININE-BSD FRML MDRD: 37 ML/MIN/1.73
GFR SERPL CREATININE-BSD FRML MDRD: 52 ML/MIN/1.73
GFR SERPL CREATININE-BSD FRML MDRD: 64 ML/MIN/1.73
GFR SERPL CREATININE-BSD FRML MDRD: 69 ML/MIN/1.73
GFR SERPL CREATININE-BSD FRML MDRD: 69 ML/MIN/1.73
GFR SERPL CREATININE-BSD FRML MDRD: 71 ML/MIN/1.73
GFR SERPL CREATININE-BSD FRML MDRD: 77 ML/MIN/1.73
GFR SERPL CREATININE-BSD FRML MDRD: 80 ML/MIN/1.73
GFR SERPL CREATININE-BSD FRML MDRD: 83 ML/MIN/1.73
GFR SERPL CREATININE-BSD FRML MDRD: 99 ML/MIN/1.73
GIANT PLATELETS: ABNORMAL
GLOBULIN UR ELPH-MCNC: 2.8 GM/DL
GLOBULIN UR ELPH-MCNC: 2.9 GM/DL
GLOBULIN UR ELPH-MCNC: 3.1 GM/DL
GLOBULIN UR ELPH-MCNC: 3.6 GM/DL
GLOBULIN UR ELPH-MCNC: 3.6 GM/DL
GLOBULIN UR ELPH-MCNC: 3.8 GM/DL
GLUCOSE BLD-MCNC: 132 MG/DL (ref 70–100)
GLUCOSE BLD-MCNC: 139 MG/DL (ref 70–100)
GLUCOSE BLD-MCNC: 159 MG/DL (ref 70–100)
GLUCOSE BLD-MCNC: 160 MG/DL (ref 70–100)
GLUCOSE BLD-MCNC: 169 MG/DL (ref 70–100)
GLUCOSE BLD-MCNC: 173 MG/DL (ref 70–100)
GLUCOSE BLD-MCNC: 306 MG/DL (ref 70–100)
GLUCOSE BLD-MCNC: 348 MG/DL (ref 70–100)
GLUCOSE BLD-MCNC: 363 MG/DL (ref 70–100)
GLUCOSE BLD-MCNC: 414 MG/DL (ref 70–100)
GLUCOSE BLD-MCNC: 495 MG/DL (ref 70–100)
GLUCOSE BLD-MCNC: 99 MG/DL (ref 70–100)
GLUCOSE BLDC GLUCOMTR-MCNC: 102 MG/DL (ref 70–130)
GLUCOSE BLDC GLUCOMTR-MCNC: 106 MG/DL (ref 70–130)
GLUCOSE BLDC GLUCOMTR-MCNC: 107 MG/DL (ref 70–130)
GLUCOSE BLDC GLUCOMTR-MCNC: 109 MG/DL (ref 70–130)
GLUCOSE BLDC GLUCOMTR-MCNC: 109 MG/DL (ref 70–130)
GLUCOSE BLDC GLUCOMTR-MCNC: 111 MG/DL (ref 70–130)
GLUCOSE BLDC GLUCOMTR-MCNC: 112 MG/DL (ref 70–130)
GLUCOSE BLDC GLUCOMTR-MCNC: 113 MG/DL (ref 70–130)
GLUCOSE BLDC GLUCOMTR-MCNC: 114 MG/DL (ref 70–130)
GLUCOSE BLDC GLUCOMTR-MCNC: 116 MG/DL (ref 70–130)
GLUCOSE BLDC GLUCOMTR-MCNC: 117 MG/DL (ref 70–130)
GLUCOSE BLDC GLUCOMTR-MCNC: 123 MG/DL (ref 70–130)
GLUCOSE BLDC GLUCOMTR-MCNC: 123 MG/DL (ref 70–130)
GLUCOSE BLDC GLUCOMTR-MCNC: 124 MG/DL (ref 70–130)
GLUCOSE BLDC GLUCOMTR-MCNC: 124 MG/DL (ref 70–130)
GLUCOSE BLDC GLUCOMTR-MCNC: 125 MG/DL (ref 70–130)
GLUCOSE BLDC GLUCOMTR-MCNC: 125 MG/DL (ref 70–130)
GLUCOSE BLDC GLUCOMTR-MCNC: 126 MG/DL (ref 70–130)
GLUCOSE BLDC GLUCOMTR-MCNC: 127 MG/DL (ref 70–130)
GLUCOSE BLDC GLUCOMTR-MCNC: 127 MG/DL (ref 70–130)
GLUCOSE BLDC GLUCOMTR-MCNC: 128 MG/DL (ref 70–130)
GLUCOSE BLDC GLUCOMTR-MCNC: 129 MG/DL (ref 70–130)
GLUCOSE BLDC GLUCOMTR-MCNC: 129 MG/DL (ref 70–130)
GLUCOSE BLDC GLUCOMTR-MCNC: 131 MG/DL (ref 70–130)
GLUCOSE BLDC GLUCOMTR-MCNC: 134 MG/DL (ref 70–130)
GLUCOSE BLDC GLUCOMTR-MCNC: 135 MG/DL (ref 70–130)
GLUCOSE BLDC GLUCOMTR-MCNC: 136 MG/DL (ref 70–130)
GLUCOSE BLDC GLUCOMTR-MCNC: 138 MG/DL (ref 70–130)
GLUCOSE BLDC GLUCOMTR-MCNC: 138 MG/DL (ref 70–130)
GLUCOSE BLDC GLUCOMTR-MCNC: 139 MG/DL (ref 70–130)
GLUCOSE BLDC GLUCOMTR-MCNC: 143 MG/DL (ref 70–130)
GLUCOSE BLDC GLUCOMTR-MCNC: 144 MG/DL (ref 70–130)
GLUCOSE BLDC GLUCOMTR-MCNC: 145 MG/DL (ref 70–130)
GLUCOSE BLDC GLUCOMTR-MCNC: 145 MG/DL (ref 70–130)
GLUCOSE BLDC GLUCOMTR-MCNC: 146 MG/DL (ref 70–130)
GLUCOSE BLDC GLUCOMTR-MCNC: 146 MG/DL (ref 70–130)
GLUCOSE BLDC GLUCOMTR-MCNC: 148 MG/DL (ref 70–130)
GLUCOSE BLDC GLUCOMTR-MCNC: 149 MG/DL (ref 70–130)
GLUCOSE BLDC GLUCOMTR-MCNC: 149 MG/DL (ref 70–130)
GLUCOSE BLDC GLUCOMTR-MCNC: 151 MG/DL (ref 70–130)
GLUCOSE BLDC GLUCOMTR-MCNC: 152 MG/DL (ref 70–130)
GLUCOSE BLDC GLUCOMTR-MCNC: 152 MG/DL (ref 70–130)
GLUCOSE BLDC GLUCOMTR-MCNC: 153 MG/DL (ref 70–130)
GLUCOSE BLDC GLUCOMTR-MCNC: 153 MG/DL (ref 70–130)
GLUCOSE BLDC GLUCOMTR-MCNC: 154 MG/DL (ref 70–130)
GLUCOSE BLDC GLUCOMTR-MCNC: 155 MG/DL (ref 70–130)
GLUCOSE BLDC GLUCOMTR-MCNC: 156 MG/DL (ref 70–130)
GLUCOSE BLDC GLUCOMTR-MCNC: 157 MG/DL (ref 70–130)
GLUCOSE BLDC GLUCOMTR-MCNC: 158 MG/DL (ref 70–130)
GLUCOSE BLDC GLUCOMTR-MCNC: 159 MG/DL (ref 70–130)
GLUCOSE BLDC GLUCOMTR-MCNC: 162 MG/DL (ref 70–130)
GLUCOSE BLDC GLUCOMTR-MCNC: 162 MG/DL (ref 70–130)
GLUCOSE BLDC GLUCOMTR-MCNC: 164 MG/DL (ref 70–130)
GLUCOSE BLDC GLUCOMTR-MCNC: 164 MG/DL (ref 70–130)
GLUCOSE BLDC GLUCOMTR-MCNC: 165 MG/DL (ref 70–130)
GLUCOSE BLDC GLUCOMTR-MCNC: 166 MG/DL (ref 70–130)
GLUCOSE BLDC GLUCOMTR-MCNC: 167 MG/DL (ref 70–130)
GLUCOSE BLDC GLUCOMTR-MCNC: 167 MG/DL (ref 70–130)
GLUCOSE BLDC GLUCOMTR-MCNC: 168 MG/DL (ref 70–130)
GLUCOSE BLDC GLUCOMTR-MCNC: 168 MG/DL (ref 70–130)
GLUCOSE BLDC GLUCOMTR-MCNC: 171 MG/DL (ref 70–130)
GLUCOSE BLDC GLUCOMTR-MCNC: 171 MG/DL (ref 70–130)
GLUCOSE BLDC GLUCOMTR-MCNC: 173 MG/DL (ref 70–130)
GLUCOSE BLDC GLUCOMTR-MCNC: 173 MG/DL (ref 70–130)
GLUCOSE BLDC GLUCOMTR-MCNC: 176 MG/DL (ref 70–130)
GLUCOSE BLDC GLUCOMTR-MCNC: 177 MG/DL (ref 70–130)
GLUCOSE BLDC GLUCOMTR-MCNC: 178 MG/DL (ref 70–130)
GLUCOSE BLDC GLUCOMTR-MCNC: 178 MG/DL (ref 70–130)
GLUCOSE BLDC GLUCOMTR-MCNC: 181 MG/DL (ref 70–130)
GLUCOSE BLDC GLUCOMTR-MCNC: 182 MG/DL (ref 70–130)
GLUCOSE BLDC GLUCOMTR-MCNC: 184 MG/DL (ref 70–130)
GLUCOSE BLDC GLUCOMTR-MCNC: 185 MG/DL (ref 70–130)
GLUCOSE BLDC GLUCOMTR-MCNC: 186 MG/DL (ref 70–130)
GLUCOSE BLDC GLUCOMTR-MCNC: 186 MG/DL (ref 70–130)
GLUCOSE BLDC GLUCOMTR-MCNC: 187 MG/DL (ref 70–130)
GLUCOSE BLDC GLUCOMTR-MCNC: 190 MG/DL (ref 70–130)
GLUCOSE BLDC GLUCOMTR-MCNC: 193 MG/DL (ref 70–130)
GLUCOSE BLDC GLUCOMTR-MCNC: 196 MG/DL (ref 70–130)
GLUCOSE BLDC GLUCOMTR-MCNC: 196 MG/DL (ref 70–130)
GLUCOSE BLDC GLUCOMTR-MCNC: 199 MG/DL (ref 70–130)
GLUCOSE BLDC GLUCOMTR-MCNC: 201 MG/DL (ref 70–130)
GLUCOSE BLDC GLUCOMTR-MCNC: 206 MG/DL (ref 70–130)
GLUCOSE BLDC GLUCOMTR-MCNC: 211 MG/DL (ref 70–130)
GLUCOSE BLDC GLUCOMTR-MCNC: 215 MG/DL (ref 70–130)
GLUCOSE BLDC GLUCOMTR-MCNC: 222 MG/DL (ref 70–130)
GLUCOSE BLDC GLUCOMTR-MCNC: 226 MG/DL (ref 70–130)
GLUCOSE BLDC GLUCOMTR-MCNC: 227 MG/DL (ref 70–130)
GLUCOSE BLDC GLUCOMTR-MCNC: 230 MG/DL (ref 70–130)
GLUCOSE BLDC GLUCOMTR-MCNC: 236 MG/DL (ref 70–130)
GLUCOSE BLDC GLUCOMTR-MCNC: 250 MG/DL (ref 70–130)
GLUCOSE BLDC GLUCOMTR-MCNC: 266 MG/DL (ref 70–130)
GLUCOSE BLDC GLUCOMTR-MCNC: 278 MG/DL (ref 70–130)
GLUCOSE BLDC GLUCOMTR-MCNC: 279 MG/DL (ref 70–130)
GLUCOSE BLDC GLUCOMTR-MCNC: 287 MG/DL (ref 70–130)
GLUCOSE BLDC GLUCOMTR-MCNC: 291 MG/DL (ref 70–130)
GLUCOSE BLDC GLUCOMTR-MCNC: 316 MG/DL (ref 70–130)
GLUCOSE BLDC GLUCOMTR-MCNC: 316 MG/DL (ref 70–130)
GLUCOSE BLDC GLUCOMTR-MCNC: 317 MG/DL (ref 70–130)
GLUCOSE BLDC GLUCOMTR-MCNC: 322 MG/DL (ref 70–130)
GLUCOSE BLDC GLUCOMTR-MCNC: 327 MG/DL (ref 70–130)
GLUCOSE BLDC GLUCOMTR-MCNC: 353 MG/DL (ref 70–130)
GLUCOSE BLDC GLUCOMTR-MCNC: 370 MG/DL (ref 70–130)
GLUCOSE BLDC GLUCOMTR-MCNC: 374 MG/DL (ref 70–130)
GLUCOSE BLDC GLUCOMTR-MCNC: 72 MG/DL (ref 70–130)
GLUCOSE BLDC GLUCOMTR-MCNC: 86 MG/DL (ref 70–130)
GLUCOSE BLDC GLUCOMTR-MCNC: 86 MG/DL (ref 70–130)
GLUCOSE BLDC GLUCOMTR-MCNC: 87 MG/DL (ref 70–130)
GLUCOSE BLDC GLUCOMTR-MCNC: 89 MG/DL (ref 70–130)
GLUCOSE BLDC GLUCOMTR-MCNC: 93 MG/DL (ref 70–130)
GLUCOSE BLDC GLUCOMTR-MCNC: 94 MG/DL (ref 70–130)
GLUCOSE BLDC GLUCOMTR-MCNC: 95 MG/DL (ref 70–130)
GLUCOSE BLDC GLUCOMTR-MCNC: 97 MG/DL (ref 70–130)
GLUCOSE BLDC GLUCOMTR-MCNC: 98 MG/DL (ref 70–130)
GLUCOSE BLDC GLUCOMTR-MCNC: 99 MG/DL (ref 70–130)
GLUCOSE FLD-MCNC: 199 MG/DL
GLUCOSE UR STRIP-MCNC: ABNORMAL MG/DL
GRAM STN SPEC: ABNORMAL
GRAM STN SPEC: NORMAL
GRAM STN SPEC: NORMAL
HBA1C MFR BLD: 7.4 %
HCO3 BLDA-SCNC: 29.2 MMOL/L (ref 20–26)
HCO3 BLDA-SCNC: 29.2 MMOL/L (ref 20–26)
HCO3 BLDA-SCNC: 29.9 MMOL/L (ref 20–26)
HCO3 BLDA-SCNC: 30 MMOL/L (ref 20–26)
HCO3 BLDA-SCNC: 30.7 MMOL/L (ref 20–26)
HCO3 BLDA-SCNC: 30.8 MMOL/L (ref 20–26)
HCO3 BLDA-SCNC: 30.9 MMOL/L (ref 20–26)
HCO3 BLDA-SCNC: 31 MMOL/L (ref 20–26)
HCO3 BLDA-SCNC: 31.1 MMOL/L (ref 20–26)
HCO3 BLDA-SCNC: 31.5 MMOL/L (ref 20–26)
HCO3 BLDA-SCNC: 31.6 MMOL/L (ref 20–26)
HCO3 BLDA-SCNC: 32.2 MMOL/L (ref 20–26)
HCO3 BLDA-SCNC: 33 MMOL/L (ref 20–26)
HCO3 BLDA-SCNC: 33.4 MMOL/L (ref 20–26)
HCO3 BLDA-SCNC: 33.8 MMOL/L (ref 20–26)
HCO3 BLDA-SCNC: 33.9 MMOL/L (ref 20–26)
HCO3 BLDA-SCNC: 34.3 MMOL/L (ref 20–26)
HCO3 BLDA-SCNC: 34.4 MMOL/L (ref 20–26)
HCT VFR BLD AUTO: 35.5 % (ref 40–52)
HCT VFR BLD AUTO: 36.5 % (ref 40–52)
HCT VFR BLD AUTO: 37.1 % (ref 40–52)
HCT VFR BLD AUTO: 37.9 % (ref 40–52)
HCT VFR BLD AUTO: 38 % (ref 40–52)
HCT VFR BLD AUTO: 38.1 % (ref 40–52)
HCT VFR BLD AUTO: 38.2 % (ref 40–52)
HCT VFR BLD AUTO: 38.7 % (ref 40–52)
HCT VFR BLD AUTO: 38.8 % (ref 40–52)
HCT VFR BLD AUTO: 38.8 % (ref 40–52)
HCT VFR BLD AUTO: 40.7 % (ref 40–52)
HGB BLD-MCNC: 11.3 G/DL (ref 14–18)
HGB BLD-MCNC: 11.5 G/DL (ref 14–18)
HGB BLD-MCNC: 11.6 G/DL (ref 14–18)
HGB BLD-MCNC: 11.7 G/DL (ref 14–18)
HGB BLD-MCNC: 11.9 G/DL (ref 14–18)
HGB BLD-MCNC: 11.9 G/DL (ref 14–18)
HGB BLD-MCNC: 12.1 G/DL (ref 14–18)
HGB BLD-MCNC: 12.2 G/DL (ref 14–18)
HGB BLD-MCNC: 12.2 G/DL (ref 14–18)
HGB BLD-MCNC: 12.4 G/DL (ref 14–18)
HGB BLD-MCNC: 12.6 G/DL (ref 14–18)
HGB UR QL STRIP.AUTO: ABNORMAL
HOROWITZ INDEX BLD+IHG-RTO: 100 %
HOROWITZ INDEX BLD+IHG-RTO: 60 %
HOROWITZ INDEX BLD+IHG-RTO: 60 %
HOROWITZ INDEX BLD+IHG-RTO: 70 %
HOROWITZ INDEX BLD+IHG-RTO: 80 %
HOROWITZ INDEX BLD+IHG-RTO: 90 %
HYALINE CASTS UR QL AUTO: ABNORMAL /LPF
IMM GRANULOCYTES # BLD AUTO: 0.94 10*3/MM3 (ref 0–0.03)
IMM GRANULOCYTES NFR BLD AUTO: 4 % (ref 0–5)
INR PPP: 1.41 (ref 0.91–1.09)
INR PPP: 1.68 (ref 0.91–1.09)
KETONES UR QL STRIP: NEGATIVE
L PNEUMO1 AG UR QL IA: NEGATIVE
LAB AP CASE REPORT: NORMAL
LDH FLD-CCNC: 2673 IU/L
LEUKOCYTE ESTERASE UR QL STRIP.AUTO: NEGATIVE
LYMPHOCYTES # BLD AUTO: 1.6 10*3/MM3 (ref 0.72–4.86)
LYMPHOCYTES # BLD MANUAL: 0.73 10*3/MM3 (ref 0.72–4.86)
LYMPHOCYTES # BLD MANUAL: 1.11 10*3/MM3 (ref 0.72–4.86)
LYMPHOCYTES # BLD MANUAL: 1.25 10*3/MM3 (ref 0.72–4.86)
LYMPHOCYTES # BLD MANUAL: 1.62 10*3/MM3 (ref 0.72–4.86)
LYMPHOCYTES NFR BLD AUTO: 6.8 % (ref 15–45)
LYMPHOCYTES NFR BLD MANUAL: 1.9 % (ref 4–12)
LYMPHOCYTES NFR BLD MANUAL: 2 % (ref 4–12)
LYMPHOCYTES NFR BLD MANUAL: 2.9 % (ref 15–45)
LYMPHOCYTES NFR BLD MANUAL: 4 % (ref 4–12)
LYMPHOCYTES NFR BLD MANUAL: 4 % (ref 4–12)
LYMPHOCYTES NFR BLD MANUAL: 5 % (ref 15–45)
LYMPHOCYTES NFR BLD MANUAL: 6.9 % (ref 15–45)
LYMPHOCYTES NFR BLD MANUAL: 7 % (ref 15–45)
LYMPHOCYTES NFR FLD MANUAL: 8 %
Lab: ABNORMAL
MAGNESIUM SERPL-MCNC: 2.3 MG/DL (ref 1.4–2.2)
MAGNESIUM SERPL-MCNC: 2.5 MG/DL (ref 1.4–2.2)
MAGNESIUM SERPL-MCNC: 2.8 MG/DL (ref 1.4–2.2)
MCH RBC QN AUTO: 26.6 PG (ref 28–32)
MCH RBC QN AUTO: 27 PG (ref 28–32)
MCH RBC QN AUTO: 27.3 PG (ref 28–32)
MCH RBC QN AUTO: 27.4 PG (ref 28–32)
MCH RBC QN AUTO: 27.5 PG (ref 28–32)
MCH RBC QN AUTO: 27.7 PG (ref 28–32)
MCH RBC QN AUTO: 27.9 PG (ref 28–32)
MCHC RBC AUTO-ENTMCNC: 30.5 G/DL (ref 33–36)
MCHC RBC AUTO-ENTMCNC: 30.6 G/DL (ref 33–36)
MCHC RBC AUTO-ENTMCNC: 30.7 G/DL (ref 33–36)
MCHC RBC AUTO-ENTMCNC: 31 G/DL (ref 33–36)
MCHC RBC AUTO-ENTMCNC: 31.4 G/DL (ref 33–36)
MCHC RBC AUTO-ENTMCNC: 31.5 G/DL (ref 33–36)
MCHC RBC AUTO-ENTMCNC: 31.5 G/DL (ref 33–36)
MCHC RBC AUTO-ENTMCNC: 31.8 G/DL (ref 33–36)
MCHC RBC AUTO-ENTMCNC: 31.9 G/DL (ref 33–36)
MCHC RBC AUTO-ENTMCNC: 32.1 G/DL (ref 33–36)
MCHC RBC AUTO-ENTMCNC: 32.5 G/DL (ref 33–36)
MCV RBC AUTO: 84.5 FL (ref 82–95)
MCV RBC AUTO: 85.7 FL (ref 82–95)
MCV RBC AUTO: 86.6 FL (ref 82–95)
MCV RBC AUTO: 86.7 FL (ref 82–95)
MCV RBC AUTO: 87.2 FL (ref 82–95)
MCV RBC AUTO: 87.3 FL (ref 82–95)
MCV RBC AUTO: 87.7 FL (ref 82–95)
MCV RBC AUTO: 88.2 FL (ref 82–95)
MCV RBC AUTO: 88.6 FL (ref 82–95)
MCV RBC AUTO: 89.5 FL (ref 82–95)
MCV RBC AUTO: 89.8 FL (ref 82–95)
METAMYELOCYTES NFR BLD MANUAL: 2 % (ref 0–0)
METAMYELOCYTES NFR BLD MANUAL: 2 % (ref 0–0)
MODALITY: ABNORMAL
MONOCYTES # BLD AUTO: 0.32 10*3/MM3 (ref 0.19–1.3)
MONOCYTES # BLD AUTO: 0.48 10*3/MM3 (ref 0.19–1.3)
MONOCYTES # BLD AUTO: 0.72 10*3/MM3 (ref 0.19–1.3)
MONOCYTES # BLD AUTO: 1.17 10*3/MM3 (ref 0.19–1.3)
MONOCYTES # BLD AUTO: 1.29 10*3/MM3 (ref 0.19–1.3)
MONOCYTES NFR BLD AUTO: 4.9 % (ref 4–12)
MONOCYTES NFR FLD: 5 %
MRSA SPEC QL CULT: NORMAL
MYELOCYTES NFR BLD MANUAL: 2 % (ref 0–0)
MYELOCYTES NFR BLD MANUAL: 3 % (ref 0–0)
NEUTROPHILS # BLD AUTO: 13.01 10*3/MM3 (ref 1.87–8.4)
NEUTROPHILS # BLD AUTO: 15.38 10*3/MM3 (ref 1.87–8.4)
NEUTROPHILS # BLD AUTO: 19.84 10*3/MM3 (ref 1.87–8.4)
NEUTROPHILS # BLD AUTO: 23.72 10*3/MM3 (ref 1.87–8.4)
NEUTROPHILS # BLD AUTO: 28.81 10*3/MM3 (ref 1.87–8.4)
NEUTROPHILS NFR BLD AUTO: 83.7 % (ref 39–78)
NEUTROPHILS NFR BLD MANUAL: 79.2 % (ref 39–78)
NEUTROPHILS NFR BLD MANUAL: 80.8 % (ref 39–78)
NEUTROPHILS NFR BLD MANUAL: 86 % (ref 39–78)
NEUTROPHILS NFR BLD MANUAL: 89 % (ref 39–78)
NEUTROPHILS NFR FLD MANUAL: 84 %
NEUTS BAND NFR BLD MANUAL: 13.5 % (ref 0–10)
NEUTS BAND NFR BLD MANUAL: 2 % (ref 0–10)
NEUTS VAC BLD QL SMEAR: ABNORMAL
NEUTS VAC BLD QL SMEAR: ABNORMAL
NITRITE UR QL STRIP: NEGATIVE
NRBC BLD AUTO-RTO: 0.2 /100 WBC (ref 0–0)
NT-PROBNP SERPL-MCNC: 1090 PG/ML (ref 0–900)
NT-PROBNP SERPL-MCNC: 691 PG/ML (ref 0–900)
PATH REPORT.FINAL DX SPEC: NORMAL
PATH REPORT.GROSS SPEC: NORMAL
PAW @ PEAK INSP FLOW SETTING VENT: 18 CMH2O
PCO2 BLDA: 37.7 MM HG (ref 35–45)
PCO2 BLDA: 41.7 MM HG (ref 35–45)
PCO2 BLDA: 45.3 MM HG (ref 35–45)
PCO2 BLDA: 47 MM HG (ref 35–45)
PCO2 BLDA: 47 MM HG (ref 35–45)
PCO2 BLDA: 47.2 MM HG (ref 35–45)
PCO2 BLDA: 48.1 MM HG (ref 35–45)
PCO2 BLDA: 48.5 MM HG (ref 35–45)
PCO2 BLDA: 49.2 MM HG (ref 35–45)
PCO2 BLDA: 50.2 MM HG (ref 35–45)
PCO2 BLDA: 51 MM HG (ref 35–45)
PCO2 BLDA: 51.7 MM HG (ref 35–45)
PCO2 BLDA: 55 MM HG (ref 35–45)
PCO2 BLDA: 55.2 MM HG (ref 35–45)
PCO2 BLDA: 55.2 MM HG (ref 35–45)
PCO2 BLDA: 57.3 MM HG (ref 35–45)
PCO2 BLDA: 58.4 MM HG (ref 35–45)
PCO2 BLDA: 59.7 MM HG (ref 35–45)
PEEP RESPIRATORY: 10 CM[H2O]
PEEP RESPIRATORY: 12 CM[H2O]
PEEP RESPIRATORY: 12.5 CM[H2O]
PEEP RESPIRATORY: 14 CM[H2O]
PEEP RESPIRATORY: 5 CM[H2O]
PEEP RESPIRATORY: 8 CM[H2O]
PH BLDA: 7.33 PH UNITS (ref 7.35–7.45)
PH BLDA: 7.34 PH UNITS (ref 7.35–7.45)
PH BLDA: 7.37 PH UNITS (ref 7.35–7.45)
PH BLDA: 7.38 PH UNITS (ref 7.35–7.45)
PH BLDA: 7.39 PH UNITS (ref 7.35–7.45)
PH BLDA: 7.4 PH UNITS (ref 7.35–7.45)
PH BLDA: 7.4 PH UNITS (ref 7.35–7.45)
PH BLDA: 7.42 PH UNITS (ref 7.35–7.45)
PH BLDA: 7.42 PH UNITS (ref 7.35–7.45)
PH BLDA: 7.43 PH UNITS (ref 7.35–7.45)
PH BLDA: 7.43 PH UNITS (ref 7.35–7.45)
PH BLDA: 7.44 PH UNITS (ref 7.35–7.45)
PH BLDA: 7.45 PH UNITS (ref 7.35–7.45)
PH BLDA: 7.45 PH UNITS (ref 7.35–7.45)
PH BLDA: 7.47 PH UNITS (ref 7.35–7.45)
PH BLDA: 7.5 PH UNITS (ref 7.35–7.45)
PH FLD: 6.8 [PH]
PH UR STRIP.AUTO: <=5 [PH] (ref 5–8)
PHOSPHATE SERPL-MCNC: 3 MG/DL (ref 2.5–4.5)
PHOSPHATE SERPL-MCNC: 4.2 MG/DL (ref 2.5–4.5)
PHOSPHATE SERPL-MCNC: 4.9 MG/DL (ref 2.5–4.5)
PHOSPHATE SERPL-MCNC: 5.2 MG/DL (ref 2.5–4.5)
PLAT MORPH BLD: NORMAL
PLAT MORPH BLD: NORMAL
PLATELET # BLD AUTO: 268 10*3/MM3 (ref 130–400)
PLATELET # BLD AUTO: 274 10*3/MM3 (ref 130–400)
PLATELET # BLD AUTO: 282 10*3/MM3 (ref 130–400)
PLATELET # BLD AUTO: 292 10*3/MM3 (ref 130–400)
PLATELET # BLD AUTO: 294 10*3/MM3 (ref 130–400)
PLATELET # BLD AUTO: 310 10*3/MM3 (ref 130–400)
PLATELET # BLD AUTO: 312 10*3/MM3 (ref 130–400)
PLATELET # BLD AUTO: 334 10*3/MM3 (ref 130–400)
PLATELET # BLD AUTO: 336 10*3/MM3 (ref 130–400)
PLATELET # BLD AUTO: 340 10*3/MM3 (ref 130–400)
PLATELET # BLD AUTO: 376 10*3/MM3 (ref 130–400)
PMV BLD AUTO: 10 FL (ref 6–12)
PMV BLD AUTO: 10.1 FL (ref 6–12)
PMV BLD AUTO: 9.3 FL (ref 6–12)
PMV BLD AUTO: 9.5 FL (ref 6–12)
PMV BLD AUTO: 9.6 FL (ref 6–12)
PMV BLD AUTO: 9.6 FL (ref 6–12)
PMV BLD AUTO: 9.7 FL (ref 6–12)
PMV BLD AUTO: 9.8 FL (ref 6–12)
PMV BLD AUTO: 9.9 FL (ref 6–12)
PO2 BLDA: 62 MM HG (ref 83–108)
PO2 BLDA: 63.9 MM HG (ref 83–108)
PO2 BLDA: 66.8 MM HG (ref 83–108)
PO2 BLDA: 68 MM HG (ref 83–108)
PO2 BLDA: 68.1 MM HG (ref 83–108)
PO2 BLDA: 70.8 MM HG (ref 83–108)
PO2 BLDA: 73.8 MM HG (ref 83–108)
PO2 BLDA: 74.7 MM HG (ref 83–108)
PO2 BLDA: 75.2 MM HG (ref 83–108)
PO2 BLDA: 75.7 MM HG (ref 83–108)
PO2 BLDA: 75.9 MM HG (ref 83–108)
PO2 BLDA: 83.7 MM HG (ref 83–108)
PO2 BLDA: 85.6 MM HG (ref 83–108)
PO2 BLDA: 85.8 MM HG (ref 83–108)
PO2 BLDA: 85.8 MM HG (ref 83–108)
PO2 BLDA: 87.8 MM HG (ref 83–108)
PO2 BLDA: 89.5 MM HG (ref 83–108)
PO2 BLDA: 92.6 MM HG (ref 83–108)
POIKILOCYTOSIS BLD QL SMEAR: ABNORMAL
POLYCHROMASIA BLD QL SMEAR: ABNORMAL
POLYCHROMASIA BLD QL SMEAR: ABNORMAL
POTASSIUM BLD-SCNC: 4.4 MMOL/L (ref 3.5–5.3)
POTASSIUM BLD-SCNC: 4.5 MMOL/L (ref 3.5–5.3)
POTASSIUM BLD-SCNC: 4.5 MMOL/L (ref 3.5–5.3)
POTASSIUM BLD-SCNC: 4.7 MMOL/L (ref 3.5–5.3)
POTASSIUM BLD-SCNC: 4.8 MMOL/L (ref 3.5–5.3)
POTASSIUM BLD-SCNC: 4.8 MMOL/L (ref 3.5–5.3)
POTASSIUM BLD-SCNC: 4.9 MMOL/L (ref 3.5–5.3)
POTASSIUM BLD-SCNC: 5 MMOL/L (ref 3.5–5.3)
POTASSIUM BLD-SCNC: 5 MMOL/L (ref 3.5–5.3)
POTASSIUM BLD-SCNC: 5.1 MMOL/L (ref 3.5–5.3)
POTASSIUM BLD-SCNC: 5.4 MMOL/L (ref 3.5–5.3)
POTASSIUM BLD-SCNC: 5.4 MMOL/L (ref 3.5–5.3)
POTASSIUM BLD-SCNC: 5.6 MMOL/L (ref 3.5–5.3)
PROCALCITONIN SERPL-MCNC: 23.1 NG/ML
PROT FLD-MCNC: 4.7 G/DL
PROT SERPL-MCNC: 5.2 G/DL (ref 6.3–8.7)
PROT SERPL-MCNC: 5.3 G/DL (ref 6.3–8.7)
PROT SERPL-MCNC: 5.8 G/DL (ref 6.3–8.7)
PROT SERPL-MCNC: 6.2 G/DL (ref 6.3–8.7)
PROT SERPL-MCNC: 7 G/DL (ref 6.3–8.7)
PROT SERPL-MCNC: 7.2 G/DL (ref 6.3–8.7)
PROT UR QL STRIP: ABNORMAL
PROTHROMBIN TIME: 17.7 SECONDS (ref 11.9–14.6)
PROTHROMBIN TIME: 20.4 SECONDS (ref 11.9–14.6)
RBC # BLD AUTO: 4.05 10*6/MM3 (ref 4.8–5.9)
RBC # BLD AUTO: 4.21 10*6/MM3 (ref 4.8–5.9)
RBC # BLD AUTO: 4.23 10*6/MM3 (ref 4.8–5.9)
RBC # BLD AUTO: 4.33 10*6/MM3 (ref 4.8–5.9)
RBC # BLD AUTO: 4.33 10*6/MM3 (ref 4.8–5.9)
RBC # BLD AUTO: 4.34 10*6/MM3 (ref 4.8–5.9)
RBC # BLD AUTO: 4.38 10*6/MM3 (ref 4.8–5.9)
RBC # BLD AUTO: 4.44 10*6/MM3 (ref 4.8–5.9)
RBC # BLD AUTO: 4.48 10*6/MM3 (ref 4.8–5.9)
RBC # BLD AUTO: 4.51 10*6/MM3 (ref 4.8–5.9)
RBC # BLD AUTO: 4.55 10*6/MM3 (ref 4.8–5.9)
RBC # FLD AUTO: 7000 /MM3
RBC # UR: ABNORMAL /HPF
RBC MORPH BLD: NORMAL
RBC MORPH BLD: NORMAL
REF LAB TEST METHOD: ABNORMAL
S PNEUM AG SPEC QL LA: NEGATIVE
SAO2 % BLDCOA: 89.5 % (ref 94–99)
SAO2 % BLDCOA: 89.7 % (ref 94–99)
SAO2 % BLDCOA: 92 % (ref 94–99)
SAO2 % BLDCOA: 92.5 % (ref 94–99)
SAO2 % BLDCOA: 92.6 % (ref 94–99)
SAO2 % BLDCOA: 93.5 % (ref 94–99)
SAO2 % BLDCOA: 93.7 % (ref 94–99)
SAO2 % BLDCOA: 93.8 % (ref 94–99)
SAO2 % BLDCOA: 94.4 % (ref 94–99)
SAO2 % BLDCOA: 94.5 % (ref 94–99)
SAO2 % BLDCOA: 95.2 % (ref 94–99)
SAO2 % BLDCOA: 95.4 % (ref 94–99)
SAO2 % BLDCOA: 95.9 % (ref 94–99)
SAO2 % BLDCOA: 96 % (ref 94–99)
SAO2 % BLDCOA: 96.4 % (ref 94–99)
SAO2 % BLDCOA: 96.5 % (ref 94–99)
SAO2 % BLDCOA: 96.5 % (ref 94–99)
SAO2 % BLDCOA: 96.8 % (ref 94–99)
SET MECH RESP RATE: 18
SET MECH RESP RATE: 20
SET MECH RESP RATE: 22
SET MECH RESP RATE: 22
SMALL PLATELETS BLD QL SMEAR: ADEQUATE
SODIUM BLD-SCNC: 133 MMOL/L (ref 135–145)
SODIUM BLD-SCNC: 134 MMOL/L (ref 135–145)
SODIUM BLD-SCNC: 137 MMOL/L (ref 135–145)
SODIUM BLD-SCNC: 139 MMOL/L (ref 135–145)
SODIUM BLD-SCNC: 140 MMOL/L (ref 135–145)
SODIUM BLD-SCNC: 140 MMOL/L (ref 135–145)
SODIUM BLD-SCNC: 141 MMOL/L (ref 135–145)
SODIUM BLD-SCNC: 142 MMOL/L (ref 135–145)
SODIUM BLD-SCNC: 144 MMOL/L (ref 135–145)
SODIUM BLD-SCNC: 145 MMOL/L (ref 135–145)
SODIUM BLD-SCNC: 145 MMOL/L (ref 135–145)
SODIUM BLD-SCNC: 146 MMOL/L (ref 135–145)
SODIUM UR-SCNC: 21 MMOL/L (ref 30–90)
SP GR UR STRIP: 1.02 (ref 1–1.03)
SPHEROCYTES BLD QL SMEAR: ABNORMAL
SQUAMOUS #/AREA URNS HPF: ABNORMAL /HPF
T4 FREE SERPL-MCNC: 1.85 NG/DL (ref 0.78–2.19)
TOXIC GRANULATION: ABNORMAL
TOXIC GRANULATION: ABNORMAL
TROPONIN I SERPL-MCNC: 0.01 NG/ML (ref 0–0.03)
TSH SERPL DL<=0.05 MIU/L-ACNC: 0.26 MIU/ML (ref 0.47–4.68)
UROBILINOGEN UR QL STRIP: ABNORMAL
VANCOMYCIN TROUGH SERPL-MCNC: 13.63 MCG/ML (ref 10–20)
VANCOMYCIN TROUGH SERPL-MCNC: 14.47 MCG/ML (ref 10–20)
VARIANT LYMPHS NFR BLD MANUAL: 1 % (ref 0–5)
VARIANT LYMPHS NFR BLD MANUAL: 1 % (ref 0–5)
VENTILATOR MODE: ABNORMAL
VENTILATOR MODE: AC
VT ON VENT VENT: 600 ML
VT ON VENT VENT: 650 ML
VT ON VENT VENT: 700 ML
VT ON VENT VENT: 700 ML
WBC # FLD AUTO: ABNORMAL /MM3
WBC MORPH BLD: NORMAL
WBC MORPH BLD: NORMAL
WBC NRBC COR # BLD: 14.59 10*3/MM3 (ref 4.8–10.8)
WBC NRBC COR # BLD: 16.02 10*3/MM3 (ref 4.8–10.8)
WBC NRBC COR # BLD: 16.47 10*3/MM3 (ref 4.8–10.8)
WBC NRBC COR # BLD: 16.59 10*3/MM3 (ref 4.8–10.8)
WBC NRBC COR # BLD: 17.3 10*3/MM3 (ref 4.8–10.8)
WBC NRBC COR # BLD: 17.88 10*3/MM3 (ref 4.8–10.8)
WBC NRBC COR # BLD: 23.69 10*3/MM3 (ref 4.8–10.8)
WBC NRBC COR # BLD: 24.26 10*3/MM3 (ref 4.8–10.8)
WBC NRBC COR # BLD: 25.17 10*3/MM3 (ref 4.8–10.8)
WBC NRBC COR # BLD: 27.17 10*3/MM3 (ref 4.8–10.8)
WBC NRBC COR # BLD: 32.37 10*3/MM3 (ref 4.8–10.8)
WBC UR QL AUTO: ABNORMAL /HPF

## 2019-01-01 PROCEDURE — 99231 SBSQ HOSP IP/OBS SF/LOW 25: CPT | Performed by: THORACIC SURGERY (CARDIOTHORACIC VASCULAR SURGERY)

## 2019-01-01 PROCEDURE — 36600 WITHDRAWAL OF ARTERIAL BLOOD: CPT

## 2019-01-01 PROCEDURE — 94799 UNLISTED PULMONARY SVC/PX: CPT

## 2019-01-01 PROCEDURE — 93005 ELECTROCARDIOGRAM TRACING: CPT | Performed by: INTERNAL MEDICINE

## 2019-01-01 PROCEDURE — 25010000002 LEVOFLOXACIN PER 250 MG: Performed by: INTERNAL MEDICINE

## 2019-01-01 PROCEDURE — 80048 BASIC METABOLIC PNL TOTAL CA: CPT | Performed by: INTERNAL MEDICINE

## 2019-01-01 PROCEDURE — 25010000002 FUROSEMIDE PER 20 MG: Performed by: INTERNAL MEDICINE

## 2019-01-01 PROCEDURE — 25010000002 CEFEPIME PER 500 MG: Performed by: INTERNAL MEDICINE

## 2019-01-01 PROCEDURE — 80053 COMPREHEN METABOLIC PANEL: CPT | Performed by: INTERNAL MEDICINE

## 2019-01-01 PROCEDURE — 25010000002 ENOXAPARIN PER 10 MG: Performed by: INTERNAL MEDICINE

## 2019-01-01 PROCEDURE — 82803 BLOOD GASES ANY COMBINATION: CPT

## 2019-01-01 PROCEDURE — 85025 COMPLETE CBC W/AUTO DIFF WBC: CPT | Performed by: INTERNAL MEDICINE

## 2019-01-01 PROCEDURE — 94760 N-INVAS EAR/PLS OXIMETRY 1: CPT

## 2019-01-01 PROCEDURE — 87081 CULTURE SCREEN ONLY: CPT | Performed by: INTERNAL MEDICINE

## 2019-01-01 PROCEDURE — 25010000002 PROPOFOL 1000 MG/ML EMULSION: Performed by: INTERNAL MEDICINE

## 2019-01-01 PROCEDURE — 71045 X-RAY EXAM CHEST 1 VIEW: CPT

## 2019-01-01 PROCEDURE — 94640 AIRWAY INHALATION TREATMENT: CPT

## 2019-01-01 PROCEDURE — 25010000002 LORAZEPAM PER 2 MG: Performed by: INTERNAL MEDICINE

## 2019-01-01 PROCEDURE — 5A1955Z RESPIRATORY VENTILATION, GREATER THAN 96 CONSECUTIVE HOURS: ICD-10-PCS | Performed by: INTERNAL MEDICINE

## 2019-01-01 PROCEDURE — 25010000002 VANCOMYCIN 10 G RECONSTITUTED SOLUTION: Performed by: INTERNAL MEDICINE

## 2019-01-01 PROCEDURE — 89051 BODY FLUID CELL COUNT: CPT | Performed by: INTERNAL MEDICINE

## 2019-01-01 PROCEDURE — 25010000002 PIPERACILLIN SOD-TAZOBACTAM PER 1 G: Performed by: INTERNAL MEDICINE

## 2019-01-01 PROCEDURE — 82945 GLUCOSE OTHER FLUID: CPT | Performed by: INTERNAL MEDICINE

## 2019-01-01 PROCEDURE — 25010000002 MEROPENEM PER 100 MG: Performed by: INTERNAL MEDICINE

## 2019-01-01 PROCEDURE — 99233 SBSQ HOSP IP/OBS HIGH 50: CPT | Performed by: INTERNAL MEDICINE

## 2019-01-01 PROCEDURE — 94003 VENT MGMT INPAT SUBQ DAY: CPT

## 2019-01-01 PROCEDURE — 25010000002 ALTEPLASE 2 MG RECONSTITUTED SOLUTION 1 EACH VIAL: Performed by: THORACIC SURGERY (CARDIOTHORACIC VASCULAR SURGERY)

## 2019-01-01 PROCEDURE — 83880 ASSAY OF NATRIURETIC PEPTIDE: CPT | Performed by: INTERNAL MEDICINE

## 2019-01-01 PROCEDURE — 25010000002 MORPHINE PER 10 MG

## 2019-01-01 PROCEDURE — 85007 BL SMEAR W/DIFF WBC COUNT: CPT | Performed by: INTERNAL MEDICINE

## 2019-01-01 PROCEDURE — 82962 GLUCOSE BLOOD TEST: CPT

## 2019-01-01 PROCEDURE — 82042 OTHER SOURCE ALBUMIN QUAN EA: CPT | Performed by: INTERNAL MEDICINE

## 2019-01-01 PROCEDURE — 85730 THROMBOPLASTIN TIME PARTIAL: CPT | Performed by: INTERNAL MEDICINE

## 2019-01-01 PROCEDURE — 94770: CPT

## 2019-01-01 PROCEDURE — 97110 THERAPEUTIC EXERCISES: CPT

## 2019-01-01 PROCEDURE — 87102 FUNGUS ISOLATION CULTURE: CPT | Performed by: INTERNAL MEDICINE

## 2019-01-01 PROCEDURE — 25010000002 VANCOMYCIN PER 500 MG: Performed by: INTERNAL MEDICINE

## 2019-01-01 PROCEDURE — 25010000002 FUROSEMIDE PER 20 MG: Performed by: NURSE PRACTITIONER

## 2019-01-01 PROCEDURE — 25010000002 PROPOFOL 1000 MG/ML EMULSION

## 2019-01-01 PROCEDURE — 83735 ASSAY OF MAGNESIUM: CPT | Performed by: INTERNAL MEDICINE

## 2019-01-01 PROCEDURE — 63710000001 INSULIN LISPRO (HUMAN) PER 5 UNITS: Performed by: INTERNAL MEDICINE

## 2019-01-01 PROCEDURE — 84443 ASSAY THYROID STIM HORMONE: CPT | Performed by: INTERNAL MEDICINE

## 2019-01-01 PROCEDURE — 25010000002 FENTANYL CITRATE (PF) 100 MCG/2ML SOLUTION: Performed by: INTERNAL MEDICINE

## 2019-01-01 PROCEDURE — 84157 ASSAY OF PROTEIN OTHER: CPT | Performed by: INTERNAL MEDICINE

## 2019-01-01 PROCEDURE — 80202 ASSAY OF VANCOMYCIN: CPT | Performed by: INTERNAL MEDICINE

## 2019-01-01 PROCEDURE — 84439 ASSAY OF FREE THYROXINE: CPT | Performed by: INTERNAL MEDICINE

## 2019-01-01 PROCEDURE — 84100 ASSAY OF PHOSPHORUS: CPT | Performed by: INTERNAL MEDICINE

## 2019-01-01 PROCEDURE — 99232 SBSQ HOSP IP/OBS MODERATE 35: CPT | Performed by: THORACIC SURGERY (CARDIOTHORACIC VASCULAR SURGERY)

## 2019-01-01 PROCEDURE — 94002 VENT MGMT INPAT INIT DAY: CPT

## 2019-01-01 PROCEDURE — 93010 ELECTROCARDIOGRAM REPORT: CPT | Performed by: INTERNAL MEDICINE

## 2019-01-01 PROCEDURE — 83036 HEMOGLOBIN GLYCOSYLATED A1C: CPT | Performed by: INTERNAL MEDICINE

## 2019-01-01 PROCEDURE — C1751 CATH, INF, PER/CENT/MIDLINE: HCPCS

## 2019-01-01 PROCEDURE — 84484 ASSAY OF TROPONIN QUANT: CPT | Performed by: INTERNAL MEDICINE

## 2019-01-01 PROCEDURE — 85027 COMPLETE CBC AUTOMATED: CPT | Performed by: INTERNAL MEDICINE

## 2019-01-01 PROCEDURE — 83615 LACTATE (LD) (LDH) ENZYME: CPT | Performed by: INTERNAL MEDICINE

## 2019-01-01 PROCEDURE — 99255 IP/OBS CONSLTJ NEW/EST HI 80: CPT | Performed by: INTERNAL MEDICINE

## 2019-01-01 PROCEDURE — 32551 INSERTION OF CHEST TUBE: CPT | Performed by: THORACIC SURGERY (CARDIOTHORACIC VASCULAR SURGERY)

## 2019-01-01 PROCEDURE — 87070 CULTURE OTHR SPECIMN AEROBIC: CPT | Performed by: INTERNAL MEDICINE

## 2019-01-01 PROCEDURE — 87205 SMEAR GRAM STAIN: CPT | Performed by: INTERNAL MEDICINE

## 2019-01-01 PROCEDURE — 03HY32Z INSERTION OF MONITORING DEVICE INTO UPPER ARTERY, PERCUTANEOUS APPROACH: ICD-10-PCS | Performed by: INTERNAL MEDICINE

## 2019-01-01 PROCEDURE — 81001 URINALYSIS AUTO W/SCOPE: CPT | Performed by: INTERNAL MEDICINE

## 2019-01-01 PROCEDURE — 87040 BLOOD CULTURE FOR BACTERIA: CPT | Performed by: INTERNAL MEDICINE

## 2019-01-01 PROCEDURE — 84300 ASSAY OF URINE SODIUM: CPT | Performed by: INTERNAL MEDICINE

## 2019-01-01 PROCEDURE — 87075 CULTR BACTERIA EXCEPT BLOOD: CPT | Performed by: INTERNAL MEDICINE

## 2019-01-01 PROCEDURE — 87899 AGENT NOS ASSAY W/OPTIC: CPT | Performed by: INTERNAL MEDICINE

## 2019-01-01 PROCEDURE — 71250 CT THORAX DX C-: CPT

## 2019-01-01 PROCEDURE — 84145 PROCALCITONIN (PCT): CPT | Performed by: INTERNAL MEDICINE

## 2019-01-01 PROCEDURE — 88305 TISSUE EXAM BY PATHOLOGIST: CPT | Performed by: INTERNAL MEDICINE

## 2019-01-01 PROCEDURE — 85610 PROTHROMBIN TIME: CPT | Performed by: INTERNAL MEDICINE

## 2019-01-01 PROCEDURE — 99233 SBSQ HOSP IP/OBS HIGH 50: CPT | Performed by: THORACIC SURGERY (CARDIOTHORACIC VASCULAR SURGERY)

## 2019-01-01 PROCEDURE — 74018 RADEX ABDOMEN 1 VIEW: CPT

## 2019-01-01 PROCEDURE — 80069 RENAL FUNCTION PANEL: CPT | Performed by: INTERNAL MEDICINE

## 2019-01-01 PROCEDURE — 83605 ASSAY OF LACTIC ACID: CPT | Performed by: INTERNAL MEDICINE

## 2019-01-01 PROCEDURE — 84132 ASSAY OF SERUM POTASSIUM: CPT | Performed by: INTERNAL MEDICINE

## 2019-01-01 PROCEDURE — 88112 CYTOPATH CELL ENHANCE TECH: CPT | Performed by: INTERNAL MEDICINE

## 2019-01-01 PROCEDURE — 0W9B00Z DRAINAGE OF LEFT PLEURAL CAVITY WITH DRAINAGE DEVICE, OPEN APPROACH: ICD-10-PCS | Performed by: THORACIC SURGERY (CARDIOTHORACIC VASCULAR SURGERY)

## 2019-01-01 PROCEDURE — 06HN33Z INSERTION OF INFUSION DEVICE INTO LEFT FEMORAL VEIN, PERCUTANEOUS APPROACH: ICD-10-PCS | Performed by: INTERNAL MEDICINE

## 2019-01-01 PROCEDURE — 88312 SPECIAL STAINS GROUP 1: CPT | Performed by: INTERNAL MEDICINE

## 2019-01-01 PROCEDURE — 99222 1ST HOSP IP/OBS MODERATE 55: CPT | Performed by: THORACIC SURGERY (CARDIOTHORACIC VASCULAR SURGERY)

## 2019-01-01 PROCEDURE — 83986 ASSAY PH BODY FLUID NOS: CPT | Performed by: INTERNAL MEDICINE

## 2019-01-01 PROCEDURE — 25010000002 FENTANYL CITRATE (PF) 100 MCG/2ML SOLUTION 5 ML AMPULE: Performed by: INTERNAL MEDICINE

## 2019-01-01 PROCEDURE — 82570 ASSAY OF URINE CREATININE: CPT | Performed by: INTERNAL MEDICINE

## 2019-01-01 PROCEDURE — 97162 PT EVAL MOD COMPLEX 30 MIN: CPT

## 2019-01-01 PROCEDURE — 87015 SPECIMEN INFECT AGNT CONCNTJ: CPT | Performed by: INTERNAL MEDICINE

## 2019-01-01 RX ORDER — ALBUTEROL SULFATE 2.5 MG/3ML
2.5 SOLUTION RESPIRATORY (INHALATION) ONCE AS NEEDED
Status: DISCONTINUED | OUTPATIENT
Start: 2019-01-01 | End: 2019-01-01 | Stop reason: HOSPADM

## 2019-01-01 RX ORDER — FUROSEMIDE 10 MG/ML
40 INJECTION INTRAMUSCULAR; INTRAVENOUS ONCE
Status: COMPLETED | OUTPATIENT
Start: 2019-01-01 | End: 2019-01-01

## 2019-01-01 RX ORDER — DEXTROSE MONOHYDRATE 25 G/50ML
25 INJECTION, SOLUTION INTRAVENOUS
Status: DISCONTINUED | OUTPATIENT
Start: 2019-01-01 | End: 2019-01-01 | Stop reason: SDUPTHER

## 2019-01-01 RX ORDER — LORAZEPAM 2 MG/ML
1 INJECTION INTRAMUSCULAR EVERY 4 HOURS PRN
Status: DISCONTINUED | OUTPATIENT
Start: 2019-01-01 | End: 2019-01-01 | Stop reason: HOSPADM

## 2019-01-01 RX ORDER — LIDOCAINE HYDROCHLORIDE 10 MG/ML
1 INJECTION, SOLUTION EPIDURAL; INFILTRATION; INTRACAUDAL; PERINEURAL ONCE
Status: COMPLETED | OUTPATIENT
Start: 2019-01-01 | End: 2019-01-01

## 2019-01-01 RX ORDER — LIDOCAINE HYDROCHLORIDE 10 MG/ML
INJECTION, SOLUTION INFILTRATION; PERINEURAL
Status: COMPLETED
Start: 2019-01-01 | End: 2019-01-01

## 2019-01-01 RX ORDER — DEXTROSE MONOHYDRATE 25 G/50ML
25-50 INJECTION, SOLUTION INTRAVENOUS
Status: DISCONTINUED | OUTPATIENT
Start: 2019-01-01 | End: 2019-01-01

## 2019-01-01 RX ORDER — ACETAMINOPHEN 325 MG/1
650 TABLET ORAL 2 TIMES DAILY
COMMUNITY

## 2019-01-01 RX ORDER — NICOTINE POLACRILEX 4 MG
15 LOZENGE BUCCAL
Status: DISCONTINUED | OUTPATIENT
Start: 2019-01-01 | End: 2019-01-01

## 2019-01-01 RX ORDER — MORPHINE SULFATE 10 MG/ML
INJECTION, SOLUTION INTRAMUSCULAR; INTRAVENOUS
Status: DISCONTINUED
Start: 2019-01-01 | End: 2019-01-01

## 2019-01-01 RX ORDER — IPRATROPIUM BROMIDE AND ALBUTEROL SULFATE 2.5; .5 MG/3ML; MG/3ML
3 SOLUTION RESPIRATORY (INHALATION)
Status: DISCONTINUED | OUTPATIENT
Start: 2019-01-01 | End: 2019-01-01

## 2019-01-01 RX ORDER — FAMOTIDINE 10 MG/ML
20 INJECTION, SOLUTION INTRAVENOUS 2 TIMES DAILY
Status: DISCONTINUED | OUTPATIENT
Start: 2019-01-01 | End: 2019-01-01 | Stop reason: HOSPADM

## 2019-01-01 RX ORDER — MORPHINE SULFATE 10 MG/ML
5 INJECTION INTRAMUSCULAR; INTRAVENOUS; SUBCUTANEOUS ONCE
Status: DISCONTINUED | OUTPATIENT
Start: 2019-01-01 | End: 2019-01-01

## 2019-01-01 RX ORDER — SODIUM CHLORIDE 9 MG/ML
50 INJECTION, SOLUTION INTRAVENOUS CONTINUOUS
Status: DISCONTINUED | OUTPATIENT
Start: 2019-01-01 | End: 2019-01-01

## 2019-01-01 RX ORDER — ONDANSETRON 4 MG/1
4 TABLET, FILM COATED ORAL EVERY 6 HOURS PRN
Status: DISCONTINUED | OUTPATIENT
Start: 2019-01-01 | End: 2019-01-01 | Stop reason: HOSPADM

## 2019-01-01 RX ORDER — MORPHINE SULFATE 2 MG/ML
2 INJECTION, SOLUTION INTRAMUSCULAR; INTRAVENOUS ONCE
Status: DISCONTINUED | OUTPATIENT
Start: 2019-01-01 | End: 2019-01-01

## 2019-01-01 RX ORDER — BISACODYL 10 MG
10 SUPPOSITORY, RECTAL RECTAL ONCE
Status: COMPLETED | OUTPATIENT
Start: 2019-01-01 | End: 2019-01-01

## 2019-01-01 RX ORDER — NICOTINE POLACRILEX 4 MG
15 LOZENGE BUCCAL
Status: DISCONTINUED | OUTPATIENT
Start: 2019-01-01 | End: 2019-01-01 | Stop reason: SDUPTHER

## 2019-01-01 RX ORDER — ONDANSETRON 2 MG/ML
4 INJECTION INTRAMUSCULAR; INTRAVENOUS EVERY 6 HOURS PRN
Status: DISCONTINUED | OUTPATIENT
Start: 2019-01-01 | End: 2019-01-01 | Stop reason: HOSPADM

## 2019-01-01 RX ORDER — MORPHINE SULFATE 2 MG/ML
INJECTION, SOLUTION INTRAMUSCULAR; INTRAVENOUS
Status: COMPLETED
Start: 2019-01-01 | End: 2019-01-01

## 2019-01-01 RX ORDER — PREDNISONE 10 MG/1
10 TABLET ORAL 2 TIMES DAILY
Status: ON HOLD | COMMUNITY
End: 2019-01-01

## 2019-01-01 RX ORDER — HYDROCHLOROTHIAZIDE 25 MG/1
25 TABLET ORAL DAILY
COMMUNITY

## 2019-01-01 RX ORDER — FUROSEMIDE 10 MG/ML
20 INJECTION INTRAMUSCULAR; INTRAVENOUS ONCE
Status: COMPLETED | OUTPATIENT
Start: 2019-01-01 | End: 2019-01-01

## 2019-01-01 RX ORDER — FENTANYL CITRATE 50 UG/ML
50 INJECTION, SOLUTION INTRAMUSCULAR; INTRAVENOUS
Status: DISCONTINUED | OUTPATIENT
Start: 2019-01-01 | End: 2019-01-01 | Stop reason: HOSPADM

## 2019-01-01 RX ORDER — LEVOFLOXACIN 5 MG/ML
750 INJECTION, SOLUTION INTRAVENOUS EVERY 24 HOURS
Status: DISCONTINUED | OUTPATIENT
Start: 2019-01-01 | End: 2019-01-01

## 2019-01-01 RX ORDER — CETIRIZINE HYDROCHLORIDE 10 MG/1
10 TABLET ORAL DAILY
COMMUNITY

## 2019-01-01 RX ORDER — CHLORHEXIDINE GLUCONATE 0.12 MG/ML
15 RINSE ORAL EVERY 12 HOURS SCHEDULED
Status: DISCONTINUED | OUTPATIENT
Start: 2019-01-01 | End: 2019-01-01

## 2019-01-01 RX ORDER — ROSUVASTATIN CALCIUM 5 MG/1
5 TABLET, COATED ORAL NIGHTLY
COMMUNITY

## 2019-01-01 RX ORDER — ALBUTEROL SULFATE 2.5 MG/3ML
2.5 SOLUTION RESPIRATORY (INHALATION) EVERY 4 HOURS PRN
Status: DISCONTINUED | OUTPATIENT
Start: 2019-01-01 | End: 2019-01-01

## 2019-01-01 RX ORDER — MELATONIN
1000 DAILY
COMMUNITY

## 2019-01-01 RX ORDER — PRAVASTATIN SODIUM 10 MG
10 TABLET ORAL DAILY
Status: ON HOLD | COMMUNITY
End: 2019-01-01

## 2019-01-01 RX ORDER — DEXTROSE MONOHYDRATE 25 G/50ML
25 INJECTION, SOLUTION INTRAVENOUS
Status: DISCONTINUED | OUTPATIENT
Start: 2019-01-01 | End: 2019-01-01

## 2019-01-01 RX ORDER — SODIUM CHLORIDE 0.9 % (FLUSH) 0.9 %
3-10 SYRINGE (ML) INJECTION AS NEEDED
Status: DISCONTINUED | OUTPATIENT
Start: 2019-01-01 | End: 2019-01-01 | Stop reason: HOSPADM

## 2019-01-01 RX ORDER — ACETAMINOPHEN 650 MG/1
650 SUPPOSITORY RECTAL EVERY 4 HOURS PRN
Status: DISCONTINUED | OUTPATIENT
Start: 2019-01-01 | End: 2019-01-01 | Stop reason: HOSPADM

## 2019-01-01 RX ORDER — FENTANYL CITRATE 50 UG/ML
25 INJECTION, SOLUTION INTRAMUSCULAR; INTRAVENOUS
Status: DISCONTINUED | OUTPATIENT
Start: 2019-01-01 | End: 2019-01-01 | Stop reason: HOSPADM

## 2019-01-01 RX ORDER — DEXTROMETHORPHAN HYDROBROMIDE AND PROMETHAZINE HYDROCHLORIDE 15; 6.25 MG/5ML; MG/5ML
5 SYRUP ORAL EVERY 6 HOURS PRN
COMMUNITY

## 2019-01-01 RX ORDER — SODIUM CHLORIDE 0.9 % (FLUSH) 0.9 %
3 SYRINGE (ML) INJECTION EVERY 12 HOURS SCHEDULED
Status: DISCONTINUED | OUTPATIENT
Start: 2019-01-01 | End: 2019-01-01 | Stop reason: HOSPADM

## 2019-01-01 RX ORDER — LISINOPRIL 40 MG/1
40 TABLET ORAL DAILY
COMMUNITY

## 2019-01-01 RX ORDER — FLUTICASONE PROPIONATE 50 MCG
2 SPRAY, SUSPENSION (ML) NASAL 2 TIMES DAILY
COMMUNITY

## 2019-01-01 RX ORDER — ACETYLCYSTEINE 200 MG/ML
1.5 SOLUTION ORAL; RESPIRATORY (INHALATION)
Status: DISCONTINUED | OUTPATIENT
Start: 2019-01-01 | End: 2019-01-01 | Stop reason: HOSPADM

## 2019-01-01 RX ORDER — NOREPINEPHRINE BITARTRATE 1 MG/ML
INJECTION, SOLUTION INTRAVENOUS
Status: COMPLETED
Start: 2019-01-01 | End: 2019-01-01

## 2019-01-01 RX ORDER — ONDANSETRON 4 MG/1
4 TABLET, ORALLY DISINTEGRATING ORAL EVERY 6 HOURS PRN
Status: DISCONTINUED | OUTPATIENT
Start: 2019-01-01 | End: 2019-01-01 | Stop reason: HOSPADM

## 2019-01-01 RX ORDER — FUROSEMIDE 10 MG/ML
40 INJECTION INTRAMUSCULAR; INTRAVENOUS
Status: DISCONTINUED | OUTPATIENT
Start: 2019-01-01 | End: 2019-01-01

## 2019-01-01 RX ORDER — SODIUM POLYSTYRENE SULFONATE 4.1 MEQ/G
15 POWDER, FOR SUSPENSION ORAL; RECTAL ONCE
Status: DISCONTINUED | OUTPATIENT
Start: 2019-01-01 | End: 2019-01-01

## 2019-01-01 RX ORDER — ACETAMINOPHEN 325 MG/1
650 TABLET ORAL EVERY 4 HOURS PRN
Status: DISCONTINUED | OUTPATIENT
Start: 2019-01-01 | End: 2019-01-01 | Stop reason: HOSPADM

## 2019-01-01 RX ORDER — AMOXICILLIN AND CLAVULANATE POTASSIUM 875; 125 MG/1; MG/1
1 TABLET, FILM COATED ORAL 2 TIMES DAILY
Status: ON HOLD | COMMUNITY
End: 2019-01-01

## 2019-01-01 RX ORDER — NICOTINE POLACRILEX 4 MG
15 LOZENGE BUCCAL
Status: DISCONTINUED | OUTPATIENT
Start: 2019-01-01 | End: 2019-01-01 | Stop reason: HOSPADM

## 2019-01-01 RX ADMIN — FENTANYL CITRATE 25 MCG: 50 INJECTION, SOLUTION INTRAMUSCULAR; INTRAVENOUS at 04:59

## 2019-01-01 RX ADMIN — INSULIN LISPRO 7 UNITS: 100 INJECTION, SOLUTION INTRAVENOUS; SUBCUTANEOUS at 05:39

## 2019-01-01 RX ADMIN — PROPOFOL 45 MCG/KG/MIN: 10 INJECTION, EMULSION INTRAVENOUS at 12:55

## 2019-01-01 RX ADMIN — TAZOBACTAM SODIUM AND PIPERACILLIN SODIUM 4.5 G: 500; 4 INJECTION, SOLUTION INTRAVENOUS at 09:37

## 2019-01-01 RX ADMIN — PROPOFOL 40 MCG/KG/MIN: 10 INJECTION, EMULSION INTRAVENOUS at 06:10

## 2019-01-01 RX ADMIN — MEROPENEM 2 G: 1 INJECTION, POWDER, FOR SOLUTION INTRAVENOUS at 08:54

## 2019-01-01 RX ADMIN — IPRATROPIUM BROMIDE AND ALBUTEROL SULFATE 3 ML: 2.5; .5 SOLUTION RESPIRATORY (INHALATION) at 15:12

## 2019-01-01 RX ADMIN — FUROSEMIDE 40 MG: 10 INJECTION, SOLUTION INTRAVENOUS at 21:05

## 2019-01-01 RX ADMIN — FAMOTIDINE 20 MG: 10 INJECTION INTRAVENOUS at 09:20

## 2019-01-01 RX ADMIN — SODIUM CHLORIDE 7 UNITS/HR: 9 INJECTION, SOLUTION INTRAVENOUS at 17:29

## 2019-01-01 RX ADMIN — SODIUM CHLORIDE 12 UNITS/HR: 9 INJECTION, SOLUTION INTRAVENOUS at 17:57

## 2019-01-01 RX ADMIN — MEROPENEM 2 G: 1 INJECTION, POWDER, FOR SOLUTION INTRAVENOUS at 08:58

## 2019-01-01 RX ADMIN — BISACODYL 10 MG: 10 SUPPOSITORY RECTAL at 09:32

## 2019-01-01 RX ADMIN — FAMOTIDINE 20 MG: 10 INJECTION INTRAVENOUS at 21:05

## 2019-01-01 RX ADMIN — LEVOFLOXACIN 750 MG: 5 INJECTION, SOLUTION INTRAVENOUS at 08:59

## 2019-01-01 RX ADMIN — IPRATROPIUM BROMIDE AND ALBUTEROL SULFATE 3 ML: 2.5; .5 SOLUTION RESPIRATORY (INHALATION) at 00:14

## 2019-01-01 RX ADMIN — IPRATROPIUM BROMIDE AND ALBUTEROL SULFATE 3 ML: 2.5; .5 SOLUTION RESPIRATORY (INHALATION) at 00:05

## 2019-01-01 RX ADMIN — PROPOFOL 50 MCG/KG/MIN: 10 INJECTION, EMULSION INTRAVENOUS at 23:51

## 2019-01-01 RX ADMIN — PROPOFOL 35 MCG/KG/MIN: 10 INJECTION, EMULSION INTRAVENOUS at 09:38

## 2019-01-01 RX ADMIN — DORNASE ALFA 50 ML: 1 SOLUTION RESPIRATORY (INHALATION) at 21:52

## 2019-01-01 RX ADMIN — PROPOFOL 25 MCG/KG/MIN: 10 INJECTION, EMULSION INTRAVENOUS at 03:02

## 2019-01-01 RX ADMIN — PROPOFOL 35 MCG/KG/MIN: 10 INJECTION, EMULSION INTRAVENOUS at 17:15

## 2019-01-01 RX ADMIN — FAMOTIDINE 20 MG: 10 INJECTION INTRAVENOUS at 21:04

## 2019-01-01 RX ADMIN — CEFEPIME 2 G: 2 INJECTION, POWDER, FOR SOLUTION INTRAVENOUS at 08:00

## 2019-01-01 RX ADMIN — PROPOFOL 30 MCG/KG/MIN: 10 INJECTION, EMULSION INTRAVENOUS at 07:46

## 2019-01-01 RX ADMIN — GUAIFENESIN 400 MG: 100 SOLUTION ORAL at 05:25

## 2019-01-01 RX ADMIN — LORAZEPAM 1 MG: 2 INJECTION INTRAMUSCULAR at 20:11

## 2019-01-01 RX ADMIN — LEVOFLOXACIN 750 MG: 5 INJECTION, SOLUTION INTRAVENOUS at 00:19

## 2019-01-01 RX ADMIN — PROPOFOL 35 MCG/KG/MIN: 10 INJECTION, EMULSION INTRAVENOUS at 12:24

## 2019-01-01 RX ADMIN — GUAIFENESIN 400 MG: 100 SOLUTION ORAL at 15:44

## 2019-01-01 RX ADMIN — SODIUM CHLORIDE, PRESERVATIVE FREE 3 ML: 5 INJECTION INTRAVENOUS at 09:21

## 2019-01-01 RX ADMIN — INSULIN LISPRO 4 UNITS: 100 INJECTION, SOLUTION INTRAVENOUS; SUBCUTANEOUS at 22:02

## 2019-01-01 RX ADMIN — NOREPINEPHRINE BITARTRATE 0.02 MCG/KG/MIN: 1 INJECTION, SOLUTION, CONCENTRATE INTRAVENOUS at 23:46

## 2019-01-01 RX ADMIN — IPRATROPIUM BROMIDE AND ALBUTEROL SULFATE 3 ML: 2.5; .5 SOLUTION RESPIRATORY (INHALATION) at 07:29

## 2019-01-01 RX ADMIN — GUAIFENESIN 400 MG: 100 SOLUTION ORAL at 06:28

## 2019-01-01 RX ADMIN — IPRATROPIUM BROMIDE AND ALBUTEROL SULFATE 3 ML: 2.5; .5 SOLUTION RESPIRATORY (INHALATION) at 21:46

## 2019-01-01 RX ADMIN — PROPOFOL 30 MCG/KG/MIN: 10 INJECTION, EMULSION INTRAVENOUS at 15:13

## 2019-01-01 RX ADMIN — FAMOTIDINE 20 MG: 10 INJECTION INTRAVENOUS at 21:57

## 2019-01-01 RX ADMIN — PROPOFOL 35 MCG/KG/MIN: 10 INJECTION, EMULSION INTRAVENOUS at 21:52

## 2019-01-01 RX ADMIN — PROPOFOL 30 MCG/KG/MIN: 10 INJECTION, EMULSION INTRAVENOUS at 20:38

## 2019-01-01 RX ADMIN — SODIUM CHLORIDE 5 UNITS/HR: 9 INJECTION, SOLUTION INTRAVENOUS at 14:43

## 2019-01-01 RX ADMIN — IPRATROPIUM BROMIDE AND ALBUTEROL SULFATE 3 ML: 2.5; .5 SOLUTION RESPIRATORY (INHALATION) at 06:47

## 2019-01-01 RX ADMIN — DORNASE ALFA 50 ML: 1 SOLUTION RESPIRATORY (INHALATION) at 22:48

## 2019-01-01 RX ADMIN — PROPOFOL 40 MCG/KG/MIN: 10 INJECTION, EMULSION INTRAVENOUS at 09:04

## 2019-01-01 RX ADMIN — IPRATROPIUM BROMIDE AND ALBUTEROL SULFATE 3 ML: 2.5; .5 SOLUTION RESPIRATORY (INHALATION) at 22:51

## 2019-01-01 RX ADMIN — IPRATROPIUM BROMIDE AND ALBUTEROL SULFATE 3 ML: 2.5; .5 SOLUTION RESPIRATORY (INHALATION) at 20:59

## 2019-01-01 RX ADMIN — IPRATROPIUM BROMIDE AND ALBUTEROL SULFATE 3 ML: 2.5; .5 SOLUTION RESPIRATORY (INHALATION) at 14:42

## 2019-01-01 RX ADMIN — IPRATROPIUM BROMIDE AND ALBUTEROL SULFATE 3 ML: 2.5; .5 SOLUTION RESPIRATORY (INHALATION) at 06:51

## 2019-01-01 RX ADMIN — FUROSEMIDE 20 MG: 10 INJECTION, SOLUTION INTRAMUSCULAR; INTRAVENOUS at 08:53

## 2019-01-01 RX ADMIN — SODIUM CHLORIDE, PRESERVATIVE FREE 3 ML: 5 INJECTION INTRAVENOUS at 20:46

## 2019-01-01 RX ADMIN — GUAIFENESIN 400 MG: 100 SOLUTION ORAL at 13:56

## 2019-01-01 RX ADMIN — PROPOFOL 50 MCG/KG/MIN: 10 INJECTION, EMULSION INTRAVENOUS at 17:30

## 2019-01-01 RX ADMIN — NOREPINEPHRINE BITARTRATE 0.02 MCG/KG/MIN: 1 INJECTION, SOLUTION, CONCENTRATE INTRAVENOUS at 11:14

## 2019-01-01 RX ADMIN — PROPOFOL 50 MCG/KG/MIN: 10 INJECTION, EMULSION INTRAVENOUS at 06:48

## 2019-01-01 RX ADMIN — PROPOFOL 40 MCG/KG/MIN: 10 INJECTION, EMULSION INTRAVENOUS at 22:14

## 2019-01-01 RX ADMIN — IPRATROPIUM BROMIDE AND ALBUTEROL SULFATE 3 ML: 2.5; .5 SOLUTION RESPIRATORY (INHALATION) at 04:22

## 2019-01-01 RX ADMIN — GUAIFENESIN 400 MG: 100 SOLUTION ORAL at 14:55

## 2019-01-01 RX ADMIN — SODIUM CHLORIDE, PRESERVATIVE FREE 3 ML: 5 INJECTION INTRAVENOUS at 21:58

## 2019-01-01 RX ADMIN — SODIUM CHLORIDE 1 UNITS/HR: 9 INJECTION, SOLUTION INTRAVENOUS at 10:40

## 2019-01-01 RX ADMIN — INSULIN LISPRO 6 UNITS: 100 INJECTION, SOLUTION INTRAVENOUS; SUBCUTANEOUS at 08:32

## 2019-01-01 RX ADMIN — VANCOMYCIN HYDROCHLORIDE 1250 MG: 10 INJECTION, POWDER, LYOPHILIZED, FOR SOLUTION INTRAVENOUS at 14:14

## 2019-01-01 RX ADMIN — FAMOTIDINE 20 MG: 10 INJECTION INTRAVENOUS at 09:16

## 2019-01-01 RX ADMIN — FENTANYL CITRATE 25 MCG: 50 INJECTION, SOLUTION INTRAMUSCULAR; INTRAVENOUS at 11:49

## 2019-01-01 RX ADMIN — PROPOFOL 30 MCG/KG/MIN: 10 INJECTION, EMULSION INTRAVENOUS at 02:57

## 2019-01-01 RX ADMIN — PROPOFOL 35 MCG/KG/MIN: 10 INJECTION, EMULSION INTRAVENOUS at 09:01

## 2019-01-01 RX ADMIN — PROPOFOL 35 MCG/KG/MIN: 10 INJECTION, EMULSION INTRAVENOUS at 16:16

## 2019-01-01 RX ADMIN — IPRATROPIUM BROMIDE AND ALBUTEROL SULFATE 3 ML: 2.5; .5 SOLUTION RESPIRATORY (INHALATION) at 11:31

## 2019-01-01 RX ADMIN — SODIUM CHLORIDE 6 UNITS/HR: 9 INJECTION, SOLUTION INTRAVENOUS at 17:34

## 2019-01-01 RX ADMIN — IPRATROPIUM BROMIDE AND ALBUTEROL SULFATE 3 ML: 2.5; .5 SOLUTION RESPIRATORY (INHALATION) at 11:27

## 2019-01-01 RX ADMIN — IPRATROPIUM BROMIDE AND ALBUTEROL SULFATE 3 ML: 2.5; .5 SOLUTION RESPIRATORY (INHALATION) at 18:49

## 2019-01-01 RX ADMIN — PROPOFOL 5 MCG/KG/MIN: 10 INJECTION, EMULSION INTRAVENOUS at 19:56

## 2019-01-01 RX ADMIN — SODIUM CHLORIDE, PRESERVATIVE FREE 3 ML: 5 INJECTION INTRAVENOUS at 21:06

## 2019-01-01 RX ADMIN — NOREPINEPHRINE BITARTRATE 0.12 MCG/KG/MIN: 1 INJECTION, SOLUTION, CONCENTRATE INTRAVENOUS at 23:33

## 2019-01-01 RX ADMIN — ENOXAPARIN SODIUM 40 MG: 40 INJECTION SUBCUTANEOUS at 08:32

## 2019-01-01 RX ADMIN — INSULIN LISPRO 7 UNITS: 100 INJECTION, SOLUTION INTRAVENOUS; SUBCUTANEOUS at 13:48

## 2019-01-01 RX ADMIN — IPRATROPIUM BROMIDE AND ALBUTEROL SULFATE 3 ML: 2.5; .5 SOLUTION RESPIRATORY (INHALATION) at 19:34

## 2019-01-01 RX ADMIN — SODIUM CHLORIDE 7 UNITS/HR: 9 INJECTION, SOLUTION INTRAVENOUS at 16:06

## 2019-01-01 RX ADMIN — DORNASE ALFA 50 ML: 1 SOLUTION RESPIRATORY (INHALATION) at 10:55

## 2019-01-01 RX ADMIN — LIDOCAINE HYDROCHLORIDE 1 ML: 10 INJECTION, SOLUTION EPIDURAL; INFILTRATION; INTRACAUDAL; PERINEURAL at 01:00

## 2019-01-01 RX ADMIN — IPRATROPIUM BROMIDE AND ALBUTEROL SULFATE 3 ML: 2.5; .5 SOLUTION RESPIRATORY (INHALATION) at 11:13

## 2019-01-01 RX ADMIN — DORNASE ALFA 50 ML: 1 SOLUTION RESPIRATORY (INHALATION) at 21:23

## 2019-01-01 RX ADMIN — IPRATROPIUM BROMIDE AND ALBUTEROL SULFATE 3 ML: 2.5; .5 SOLUTION RESPIRATORY (INHALATION) at 02:36

## 2019-01-01 RX ADMIN — GUAIFENESIN 400 MG: 100 SOLUTION ORAL at 13:40

## 2019-01-01 RX ADMIN — IPRATROPIUM BROMIDE AND ALBUTEROL SULFATE 3 ML: 2.5; .5 SOLUTION RESPIRATORY (INHALATION) at 11:53

## 2019-01-01 RX ADMIN — TAZOBACTAM SODIUM AND PIPERACILLIN SODIUM 4.5 G: 500; 4 INJECTION, SOLUTION INTRAVENOUS at 06:47

## 2019-01-01 RX ADMIN — INSULIN LISPRO 7 UNITS: 100 INJECTION, SOLUTION INTRAVENOUS; SUBCUTANEOUS at 16:59

## 2019-01-01 RX ADMIN — ACETYLCYSTEINE 1.5 ML: 200 INHALANT RESPIRATORY (INHALATION) at 15:12

## 2019-01-01 RX ADMIN — SODIUM CHLORIDE, PRESERVATIVE FREE 3 ML: 5 INJECTION INTRAVENOUS at 09:37

## 2019-01-01 RX ADMIN — PROPOFOL 35 MCG/KG/MIN: 10 INJECTION, EMULSION INTRAVENOUS at 23:08

## 2019-01-01 RX ADMIN — SODIUM CHLORIDE 7 UNITS/HR: 9 INJECTION, SOLUTION INTRAVENOUS at 13:10

## 2019-01-01 RX ADMIN — PROPOFOL 40 MCG/KG/MIN: 10 INJECTION, EMULSION INTRAVENOUS at 00:40

## 2019-01-01 RX ADMIN — IPRATROPIUM BROMIDE AND ALBUTEROL SULFATE 3 ML: 2.5; .5 SOLUTION RESPIRATORY (INHALATION) at 11:11

## 2019-01-01 RX ADMIN — FUROSEMIDE 40 MG: 10 INJECTION, SOLUTION INTRAVENOUS at 08:22

## 2019-01-01 RX ADMIN — INSULIN LISPRO 2 UNITS: 100 INJECTION, SOLUTION INTRAVENOUS; SUBCUTANEOUS at 18:18

## 2019-01-01 RX ADMIN — LEVOFLOXACIN 750 MG: 5 INJECTION, SOLUTION INTRAVENOUS at 00:51

## 2019-01-01 RX ADMIN — IPRATROPIUM BROMIDE AND ALBUTEROL SULFATE 3 ML: 2.5; .5 SOLUTION RESPIRATORY (INHALATION) at 11:30

## 2019-01-01 RX ADMIN — PROPOFOL 35 MCG/KG/MIN: 10 INJECTION, EMULSION INTRAVENOUS at 05:46

## 2019-01-01 RX ADMIN — PROPOFOL 40 MCG/KG/MIN: 10 INJECTION, EMULSION INTRAVENOUS at 10:33

## 2019-01-01 RX ADMIN — VANCOMYCIN HYDROCHLORIDE 1250 MG: 10 INJECTION, POWDER, LYOPHILIZED, FOR SOLUTION INTRAVENOUS at 05:08

## 2019-01-01 RX ADMIN — VANCOMYCIN HYDROCHLORIDE 1250 MG: 10 INJECTION, POWDER, LYOPHILIZED, FOR SOLUTION INTRAVENOUS at 07:55

## 2019-01-01 RX ADMIN — PROPOFOL 35 MCG/KG/MIN: 10 INJECTION, EMULSION INTRAVENOUS at 03:30

## 2019-01-01 RX ADMIN — IPRATROPIUM BROMIDE AND ALBUTEROL SULFATE 3 ML: 2.5; .5 SOLUTION RESPIRATORY (INHALATION) at 03:40

## 2019-01-01 RX ADMIN — PROPOFOL 40 MCG/KG/MIN: 10 INJECTION, EMULSION INTRAVENOUS at 08:21

## 2019-01-01 RX ADMIN — PROPOFOL 35 MCG/KG/MIN: 10 INJECTION, EMULSION INTRAVENOUS at 02:20

## 2019-01-01 RX ADMIN — SODIUM CHLORIDE 1 UNITS/HR: 9 INJECTION, SOLUTION INTRAVENOUS at 01:11

## 2019-01-01 RX ADMIN — IPRATROPIUM BROMIDE AND ALBUTEROL SULFATE 3 ML: 2.5; .5 SOLUTION RESPIRATORY (INHALATION) at 03:06

## 2019-01-01 RX ADMIN — PROPOFOL 40 MCG/KG/MIN: 10 INJECTION, EMULSION INTRAVENOUS at 06:31

## 2019-01-01 RX ADMIN — FENTANYL CITRATE 25 MCG: 50 INJECTION, SOLUTION INTRAMUSCULAR; INTRAVENOUS at 15:08

## 2019-01-01 RX ADMIN — PROPOFOL 40 MCG/KG/MIN: 10 INJECTION, EMULSION INTRAVENOUS at 16:26

## 2019-01-01 RX ADMIN — PROPOFOL 40 MCG/KG/MIN: 10 INJECTION, EMULSION INTRAVENOUS at 13:25

## 2019-01-01 RX ADMIN — GUAIFENESIN 400 MG: 100 SOLUTION ORAL at 05:18

## 2019-01-01 RX ADMIN — MEROPENEM 2 G: 1 INJECTION, POWDER, FOR SOLUTION INTRAVENOUS at 17:34

## 2019-01-01 RX ADMIN — SODIUM CHLORIDE, PRESERVATIVE FREE 3 ML: 5 INJECTION INTRAVENOUS at 09:23

## 2019-01-01 RX ADMIN — PROPOFOL 35 MCG/KG/MIN: 10 INJECTION, EMULSION INTRAVENOUS at 20:30

## 2019-01-01 RX ADMIN — TAZOBACTAM SODIUM AND PIPERACILLIN SODIUM 4.5 G: 500; 4 INJECTION, SOLUTION INTRAVENOUS at 15:34

## 2019-01-01 RX ADMIN — SODIUM CHLORIDE 3 UNITS/HR: 9 INJECTION, SOLUTION INTRAVENOUS at 05:22

## 2019-01-01 RX ADMIN — FAMOTIDINE 20 MG: 10 INJECTION INTRAVENOUS at 08:58

## 2019-01-01 RX ADMIN — TAZOBACTAM SODIUM AND PIPERACILLIN SODIUM 4.5 G: 500; 4 INJECTION, SOLUTION INTRAVENOUS at 23:51

## 2019-01-01 RX ADMIN — PROPOFOL 30 MCG/KG/MIN: 10 INJECTION, EMULSION INTRAVENOUS at 14:34

## 2019-01-01 RX ADMIN — SODIUM CHLORIDE 10 UNITS/HR: 9 INJECTION, SOLUTION INTRAVENOUS at 16:41

## 2019-01-01 RX ADMIN — FUROSEMIDE 40 MG: 10 INJECTION, SOLUTION INTRAVENOUS at 09:32

## 2019-01-01 RX ADMIN — SODIUM CHLORIDE 7 UNITS/HR: 9 INJECTION, SOLUTION INTRAVENOUS at 02:16

## 2019-01-01 RX ADMIN — PROPOFOL 35 MCG/KG/MIN: 10 INJECTION, EMULSION INTRAVENOUS at 15:19

## 2019-01-01 RX ADMIN — TAZOBACTAM SODIUM AND PIPERACILLIN SODIUM 4.5 G: 500; 4 INJECTION, SOLUTION INTRAVENOUS at 15:59

## 2019-01-01 RX ADMIN — FAMOTIDINE 20 MG: 10 INJECTION INTRAVENOUS at 08:03

## 2019-01-01 RX ADMIN — PROPOFOL 35 MCG/KG/MIN: 10 INJECTION, EMULSION INTRAVENOUS at 08:16

## 2019-01-01 RX ADMIN — FAMOTIDINE 20 MG: 10 INJECTION INTRAVENOUS at 08:26

## 2019-01-01 RX ADMIN — IPRATROPIUM BROMIDE AND ALBUTEROL SULFATE 3 ML: 2.5; .5 SOLUTION RESPIRATORY (INHALATION) at 04:06

## 2019-01-01 RX ADMIN — ACETYLCYSTEINE 1.5 ML: 200 INHALANT RESPIRATORY (INHALATION) at 23:10

## 2019-01-01 RX ADMIN — IPRATROPIUM BROMIDE AND ALBUTEROL SULFATE 3 ML: 2.5; .5 SOLUTION RESPIRATORY (INHALATION) at 23:10

## 2019-01-01 RX ADMIN — IPRATROPIUM BROMIDE AND ALBUTEROL SULFATE 3 ML: 2.5; .5 SOLUTION RESPIRATORY (INHALATION) at 19:54

## 2019-01-01 RX ADMIN — IPRATROPIUM BROMIDE AND ALBUTEROL SULFATE 3 ML: 2.5; .5 SOLUTION RESPIRATORY (INHALATION) at 07:32

## 2019-01-01 RX ADMIN — NOREPINEPHRINE BITARTRATE 0.06 MCG/KG/MIN: 1 INJECTION, SOLUTION, CONCENTRATE INTRAVENOUS at 11:25

## 2019-01-01 RX ADMIN — CEFEPIME HYDROCHLORIDE 2 G: 2 INJECTION, POWDER, FOR SOLUTION INTRAVENOUS at 01:00

## 2019-01-01 RX ADMIN — LEVOFLOXACIN 750 MG: 5 INJECTION, SOLUTION INTRAVENOUS at 09:04

## 2019-01-01 RX ADMIN — VANCOMYCIN HYDROCHLORIDE 1250 MG: 10 INJECTION, POWDER, LYOPHILIZED, FOR SOLUTION INTRAVENOUS at 20:46

## 2019-01-01 RX ADMIN — IPRATROPIUM BROMIDE AND ALBUTEROL SULFATE 3 ML: 2.5; .5 SOLUTION RESPIRATORY (INHALATION) at 07:21

## 2019-01-01 RX ADMIN — IPRATROPIUM BROMIDE AND ALBUTEROL SULFATE 3 ML: 2.5; .5 SOLUTION RESPIRATORY (INHALATION) at 15:22

## 2019-01-01 RX ADMIN — PROPOFOL 50 MCG/KG/MIN: 10 INJECTION, EMULSION INTRAVENOUS at 10:18

## 2019-01-01 RX ADMIN — INSULIN LISPRO 8 UNITS: 100 INJECTION, SOLUTION INTRAVENOUS; SUBCUTANEOUS at 00:51

## 2019-01-01 RX ADMIN — IPRATROPIUM BROMIDE AND ALBUTEROL SULFATE 3 ML: 2.5; .5 SOLUTION RESPIRATORY (INHALATION) at 06:38

## 2019-01-01 RX ADMIN — VANCOMYCIN HYDROCHLORIDE 1250 MG: 10 INJECTION, POWDER, LYOPHILIZED, FOR SOLUTION INTRAVENOUS at 20:03

## 2019-01-01 RX ADMIN — SODIUM CHLORIDE 6 UNITS/HR: 9 INJECTION, SOLUTION INTRAVENOUS at 03:11

## 2019-01-01 RX ADMIN — CEFEPIME 2 G: 2 INJECTION, POWDER, FOR SOLUTION INTRAVENOUS at 00:16

## 2019-01-01 RX ADMIN — SODIUM CHLORIDE 4 UNITS/HR: 9 INJECTION, SOLUTION INTRAVENOUS at 09:01

## 2019-01-01 RX ADMIN — SODIUM CHLORIDE, PRESERVATIVE FREE 3 ML: 5 INJECTION INTRAVENOUS at 20:14

## 2019-01-01 RX ADMIN — TAZOBACTAM SODIUM AND PIPERACILLIN SODIUM 4.5 G: 500; 4 INJECTION, SOLUTION INTRAVENOUS at 14:19

## 2019-01-01 RX ADMIN — PROPOFOL 40 MCG/KG/MIN: 10 INJECTION, EMULSION INTRAVENOUS at 12:29

## 2019-01-01 RX ADMIN — ACETYLCYSTEINE 1.5 ML: 200 INHALANT RESPIRATORY (INHALATION) at 07:45

## 2019-01-01 RX ADMIN — PROPOFOL 35 MCG/KG/MIN: 10 INJECTION, EMULSION INTRAVENOUS at 11:33

## 2019-01-01 RX ADMIN — GUAIFENESIN 400 MG: 100 SOLUTION ORAL at 05:03

## 2019-01-01 RX ADMIN — SODIUM CHLORIDE 7 UNITS/HR: 9 INJECTION, SOLUTION INTRAVENOUS at 16:03

## 2019-01-01 RX ADMIN — IPRATROPIUM BROMIDE AND ALBUTEROL SULFATE 3 ML: 2.5; .5 SOLUTION RESPIRATORY (INHALATION) at 04:45

## 2019-01-01 RX ADMIN — SODIUM CHLORIDE 13 UNITS/HR: 9 INJECTION, SOLUTION INTRAVENOUS at 08:59

## 2019-01-01 RX ADMIN — ENOXAPARIN SODIUM 40 MG: 40 INJECTION SUBCUTANEOUS at 08:02

## 2019-01-01 RX ADMIN — IPRATROPIUM BROMIDE AND ALBUTEROL SULFATE 3 ML: 2.5; .5 SOLUTION RESPIRATORY (INHALATION) at 10:22

## 2019-01-01 RX ADMIN — SODIUM CHLORIDE, PRESERVATIVE FREE 3 ML: 5 INJECTION INTRAVENOUS at 08:26

## 2019-01-01 RX ADMIN — IPRATROPIUM BROMIDE AND ALBUTEROL SULFATE 3 ML: 2.5; .5 SOLUTION RESPIRATORY (INHALATION) at 10:52

## 2019-01-01 RX ADMIN — SODIUM CHLORIDE 50 ML/HR: 9 INJECTION, SOLUTION INTRAVENOUS at 00:41

## 2019-01-01 RX ADMIN — CEFEPIME 2 G: 2 INJECTION, POWDER, FOR SOLUTION INTRAVENOUS at 09:16

## 2019-01-01 RX ADMIN — SODIUM CHLORIDE, PRESERVATIVE FREE 3 ML: 5 INJECTION INTRAVENOUS at 08:53

## 2019-01-01 RX ADMIN — PROPOFOL 35 MCG/KG/MIN: 10 INJECTION, EMULSION INTRAVENOUS at 10:36

## 2019-01-01 RX ADMIN — SODIUM CHLORIDE, PRESERVATIVE FREE 3 ML: 5 INJECTION INTRAVENOUS at 21:27

## 2019-01-01 RX ADMIN — PROPOFOL 50 MCG/KG/MIN: 10 INJECTION, EMULSION INTRAVENOUS at 08:31

## 2019-01-01 RX ADMIN — CEFEPIME 2 G: 2 INJECTION, POWDER, FOR SOLUTION INTRAVENOUS at 08:07

## 2019-01-01 RX ADMIN — MEROPENEM 2 G: 1 INJECTION, POWDER, FOR SOLUTION INTRAVENOUS at 00:27

## 2019-01-01 RX ADMIN — PROPOFOL 50 MCG/KG/MIN: 10 INJECTION, EMULSION INTRAVENOUS at 00:23

## 2019-01-01 RX ADMIN — SODIUM CHLORIDE 50 ML/HR: 9 INJECTION, SOLUTION INTRAVENOUS at 00:51

## 2019-01-01 RX ADMIN — PROPOFOL 30 MCG/KG/MIN: 10 INJECTION, EMULSION INTRAVENOUS at 22:26

## 2019-01-01 RX ADMIN — PROPOFOL 35 MCG/KG/MIN: 10 INJECTION, EMULSION INTRAVENOUS at 06:39

## 2019-01-01 RX ADMIN — FUROSEMIDE 40 MG: 10 INJECTION, SOLUTION INTRAVENOUS at 10:06

## 2019-01-01 RX ADMIN — IPRATROPIUM BROMIDE AND ALBUTEROL SULFATE 3 ML: 2.5; .5 SOLUTION RESPIRATORY (INHALATION) at 19:11

## 2019-01-01 RX ADMIN — FAMOTIDINE 20 MG: 10 INJECTION INTRAVENOUS at 20:14

## 2019-01-01 RX ADMIN — PROPOFOL 30 MCG/KG/MIN: 10 INJECTION, EMULSION INTRAVENOUS at 22:02

## 2019-01-01 RX ADMIN — MEROPENEM 2 G: 1 INJECTION, POWDER, FOR SOLUTION INTRAVENOUS at 00:34

## 2019-01-01 RX ADMIN — IPRATROPIUM BROMIDE AND ALBUTEROL SULFATE 3 ML: 2.5; .5 SOLUTION RESPIRATORY (INHALATION) at 11:15

## 2019-01-01 RX ADMIN — PROPOFOL 50 MCG/KG/MIN: 10 INJECTION, EMULSION INTRAVENOUS at 14:21

## 2019-01-01 RX ADMIN — GUAIFENESIN 400 MG: 100 SOLUTION ORAL at 15:08

## 2019-01-01 RX ADMIN — ENOXAPARIN SODIUM 40 MG: 40 INJECTION SUBCUTANEOUS at 09:15

## 2019-01-01 RX ADMIN — SODIUM CHLORIDE, PRESERVATIVE FREE 3 ML: 5 INJECTION INTRAVENOUS at 22:19

## 2019-01-01 RX ADMIN — PROPOFOL 40 MCG/KG/MIN: 10 INJECTION, EMULSION INTRAVENOUS at 23:53

## 2019-01-01 RX ADMIN — NOREPINEPHRINE BITARTRATE: 1 INJECTION, SOLUTION, CONCENTRATE INTRAVENOUS at 11:14

## 2019-01-01 RX ADMIN — TAZOBACTAM SODIUM AND PIPERACILLIN SODIUM 4.5 G: 500; 4 INJECTION, SOLUTION INTRAVENOUS at 06:17

## 2019-01-01 RX ADMIN — FENTANYL CITRATE 25 MCG: 50 INJECTION, SOLUTION INTRAMUSCULAR; INTRAVENOUS at 12:50

## 2019-01-01 RX ADMIN — PROPOFOL 35 MCG/KG/MIN: 10 INJECTION, EMULSION INTRAVENOUS at 11:58

## 2019-01-01 RX ADMIN — GUAIFENESIN 400 MG: 100 SOLUTION ORAL at 21:57

## 2019-01-01 RX ADMIN — PROPOFOL 30 MCG/KG/MIN: 10 INJECTION, EMULSION INTRAVENOUS at 07:59

## 2019-01-01 RX ADMIN — PROPOFOL 25 MCG/KG/MIN: 10 INJECTION, EMULSION INTRAVENOUS at 12:09

## 2019-01-01 RX ADMIN — LIDOCAINE HYDROCHLORIDE 20 ML: 10 INJECTION, SOLUTION INFILTRATION; PERINEURAL at 08:30

## 2019-01-01 RX ADMIN — IPRATROPIUM BROMIDE AND ALBUTEROL SULFATE 3 ML: 2.5; .5 SOLUTION RESPIRATORY (INHALATION) at 10:33

## 2019-01-01 RX ADMIN — PROPOFOL 50 MCG/KG/MIN: 10 INJECTION, EMULSION INTRAVENOUS at 08:58

## 2019-01-01 RX ADMIN — ENOXAPARIN SODIUM 40 MG: 40 INJECTION SUBCUTANEOUS at 08:58

## 2019-01-01 RX ADMIN — SODIUM CHLORIDE 4 UNITS/HR: 9 INJECTION, SOLUTION INTRAVENOUS at 00:27

## 2019-01-01 RX ADMIN — DORNASE ALFA 50 ML: 1 SOLUTION RESPIRATORY (INHALATION) at 10:06

## 2019-01-01 RX ADMIN — LIDOCAINE HYDROCHLORIDE: 10 INJECTION, SOLUTION INFILTRATION; PERINEURAL at 22:15

## 2019-01-01 RX ADMIN — FAMOTIDINE 20 MG: 10 INJECTION INTRAVENOUS at 08:06

## 2019-01-01 RX ADMIN — PROPOFOL 50 MCG/KG/MIN: 10 INJECTION, EMULSION INTRAVENOUS at 01:03

## 2019-01-01 RX ADMIN — PROPOFOL 40 MCG/KG/MIN: 10 INJECTION, EMULSION INTRAVENOUS at 22:36

## 2019-01-01 RX ADMIN — CEFEPIME 2 G: 2 INJECTION, POWDER, FOR SOLUTION INTRAVENOUS at 17:24

## 2019-01-01 RX ADMIN — IPRATROPIUM BROMIDE AND ALBUTEROL SULFATE 3 ML: 2.5; .5 SOLUTION RESPIRATORY (INHALATION) at 03:34

## 2019-01-01 RX ADMIN — FAMOTIDINE 20 MG: 10 INJECTION INTRAVENOUS at 20:00

## 2019-01-01 RX ADMIN — NOREPINEPHRINE BITARTRATE 0.06 MCG/KG/MIN: 1 INJECTION, SOLUTION, CONCENTRATE INTRAVENOUS at 10:33

## 2019-01-01 RX ADMIN — IPRATROPIUM BROMIDE AND ALBUTEROL SULFATE 3 ML: 2.5; .5 SOLUTION RESPIRATORY (INHALATION) at 23:57

## 2019-01-01 RX ADMIN — SODIUM CHLORIDE 10 UNITS/HR: 9 INJECTION, SOLUTION INTRAVENOUS at 08:33

## 2019-01-01 RX ADMIN — SODIUM CHLORIDE 4 UNITS/HR: 9 INJECTION, SOLUTION INTRAVENOUS at 09:32

## 2019-01-01 RX ADMIN — FAMOTIDINE 20 MG: 10 INJECTION INTRAVENOUS at 21:27

## 2019-01-01 RX ADMIN — SODIUM CHLORIDE 9 UNITS/HR: 9 INJECTION, SOLUTION INTRAVENOUS at 01:03

## 2019-01-01 RX ADMIN — SODIUM CHLORIDE, PRESERVATIVE FREE 3 ML: 5 INJECTION INTRAVENOUS at 08:06

## 2019-01-01 RX ADMIN — IPRATROPIUM BROMIDE AND ALBUTEROL SULFATE 3 ML: 2.5; .5 SOLUTION RESPIRATORY (INHALATION) at 13:54

## 2019-01-01 RX ADMIN — SODIUM CHLORIDE, PRESERVATIVE FREE 3 ML: 5 INJECTION INTRAVENOUS at 08:01

## 2019-01-01 RX ADMIN — SODIUM CHLORIDE, PRESERVATIVE FREE 3 ML: 5 INJECTION INTRAVENOUS at 20:00

## 2019-01-01 RX ADMIN — IPRATROPIUM BROMIDE AND ALBUTEROL SULFATE 3 ML: 2.5; .5 SOLUTION RESPIRATORY (INHALATION) at 15:35

## 2019-01-01 RX ADMIN — DORNASE ALFA 50 ML: 1 SOLUTION RESPIRATORY (INHALATION) at 09:25

## 2019-01-01 RX ADMIN — SODIUM CHLORIDE, PRESERVATIVE FREE 3 ML: 5 INJECTION INTRAVENOUS at 21:57

## 2019-01-01 RX ADMIN — PROPOFOL 50 MCG/KG/MIN: 10 INJECTION, EMULSION INTRAVENOUS at 18:20

## 2019-01-01 RX ADMIN — IPRATROPIUM BROMIDE AND ALBUTEROL SULFATE 3 ML: 2.5; .5 SOLUTION RESPIRATORY (INHALATION) at 07:45

## 2019-01-01 RX ADMIN — IPRATROPIUM BROMIDE AND ALBUTEROL SULFATE 3 ML: 2.5; .5 SOLUTION RESPIRATORY (INHALATION) at 23:24

## 2019-01-01 RX ADMIN — PROPOFOL 50 MCG/KG/MIN: 10 INJECTION, EMULSION INTRAVENOUS at 04:32

## 2019-01-01 RX ADMIN — ACETYLCYSTEINE 1.5 ML: 200 INHALANT RESPIRATORY (INHALATION) at 07:29

## 2019-01-01 RX ADMIN — SODIUM CHLORIDE 11 UNITS/HR: 9 INJECTION, SOLUTION INTRAVENOUS at 07:02

## 2019-01-01 RX ADMIN — DORNASE ALFA 50 ML: 1 SOLUTION RESPIRATORY (INHALATION) at 22:30

## 2019-01-01 RX ADMIN — FENTANYL CITRATE 50 MCG: 50 INJECTION, SOLUTION INTRAMUSCULAR; INTRAVENOUS at 13:26

## 2019-01-01 RX ADMIN — FAMOTIDINE 20 MG: 10 INJECTION INTRAVENOUS at 22:17

## 2019-01-01 RX ADMIN — GUAIFENESIN 400 MG: 100 SOLUTION ORAL at 06:55

## 2019-01-01 RX ADMIN — MEROPENEM 2 G: 1 INJECTION, POWDER, FOR SOLUTION INTRAVENOUS at 10:05

## 2019-01-01 RX ADMIN — GUAIFENESIN 400 MG: 100 SOLUTION ORAL at 14:35

## 2019-01-01 RX ADMIN — IPRATROPIUM BROMIDE AND ALBUTEROL SULFATE 3 ML: 2.5; .5 SOLUTION RESPIRATORY (INHALATION) at 04:30

## 2019-01-01 RX ADMIN — PROPOFOL 35 MCG/KG/MIN: 10 INJECTION, EMULSION INTRAVENOUS at 08:28

## 2019-01-01 RX ADMIN — IPRATROPIUM BROMIDE AND ALBUTEROL SULFATE 3 ML: 2.5; .5 SOLUTION RESPIRATORY (INHALATION) at 15:36

## 2019-01-01 RX ADMIN — VANCOMYCIN HYDROCHLORIDE 1250 MG: 10 INJECTION, POWDER, LYOPHILIZED, FOR SOLUTION INTRAVENOUS at 13:40

## 2019-01-01 RX ADMIN — PROPOFOL 30 MCG/KG/MIN: 10 INJECTION, EMULSION INTRAVENOUS at 15:33

## 2019-01-01 RX ADMIN — SODIUM CHLORIDE, PRESERVATIVE FREE 3 ML: 5 INJECTION INTRAVENOUS at 08:28

## 2019-01-01 RX ADMIN — TAZOBACTAM SODIUM AND PIPERACILLIN SODIUM 4.5 G: 500; 4 INJECTION, SOLUTION INTRAVENOUS at 22:49

## 2019-01-01 RX ADMIN — PROPOFOL 50 MCG/KG/MIN: 10 INJECTION, EMULSION INTRAVENOUS at 02:11

## 2019-01-01 RX ADMIN — SODIUM CHLORIDE 50 ML/HR: 9 INJECTION, SOLUTION INTRAVENOUS at 17:24

## 2019-01-01 RX ADMIN — VANCOMYCIN HYDROCHLORIDE 2000 MG: 1 INJECTION, POWDER, LYOPHILIZED, FOR SOLUTION INTRAVENOUS at 02:52

## 2019-01-01 RX ADMIN — GUAIFENESIN 400 MG: 100 SOLUTION ORAL at 21:27

## 2019-01-01 RX ADMIN — PROPOFOL 35 MCG/KG/MIN: 10 INJECTION, EMULSION INTRAVENOUS at 21:11

## 2019-01-01 RX ADMIN — IPRATROPIUM BROMIDE AND ALBUTEROL SULFATE 3 ML: 2.5; .5 SOLUTION RESPIRATORY (INHALATION) at 23:17

## 2019-01-01 RX ADMIN — CEFEPIME 2 G: 2 INJECTION, POWDER, FOR SOLUTION INTRAVENOUS at 00:51

## 2019-01-01 RX ADMIN — VANCOMYCIN HYDROCHLORIDE 1250 MG: 10 INJECTION, POWDER, LYOPHILIZED, FOR SOLUTION INTRAVENOUS at 02:11

## 2019-01-01 RX ADMIN — PROPOFOL 40 MCG/KG/MIN: 10 INJECTION, EMULSION INTRAVENOUS at 18:33

## 2019-01-01 RX ADMIN — CEFEPIME 2 G: 2 INJECTION, POWDER, FOR SOLUTION INTRAVENOUS at 08:32

## 2019-01-01 RX ADMIN — NOREPINEPHRINE BITARTRATE 0.02 MCG/KG/MIN: 1 INJECTION, SOLUTION, CONCENTRATE INTRAVENOUS at 21:23

## 2019-01-01 RX ADMIN — PROPOFOL 35 MCG/KG/MIN: 10 INJECTION, EMULSION INTRAVENOUS at 14:06

## 2019-01-01 RX ADMIN — SODIUM CHLORIDE, PRESERVATIVE FREE 3 ML: 5 INJECTION INTRAVENOUS at 00:22

## 2019-01-01 RX ADMIN — CEFEPIME 2 G: 2 INJECTION, POWDER, FOR SOLUTION INTRAVENOUS at 17:26

## 2019-01-01 RX ADMIN — SODIUM CHLORIDE 250 ML: 9 INJECTION, SOLUTION INTRAVENOUS at 17:25

## 2019-01-01 RX ADMIN — PROPOFOL 40 MCG/KG/MIN: 10 INJECTION, EMULSION INTRAVENOUS at 03:08

## 2019-01-01 RX ADMIN — IPRATROPIUM BROMIDE AND ALBUTEROL SULFATE 3 ML: 2.5; .5 SOLUTION RESPIRATORY (INHALATION) at 03:20

## 2019-01-01 RX ADMIN — PROPOFOL 50 MCG/KG/MIN: 10 INJECTION, EMULSION INTRAVENOUS at 15:51

## 2019-01-01 RX ADMIN — TAZOBACTAM SODIUM AND PIPERACILLIN SODIUM 4.5 G: 500; 4 INJECTION, SOLUTION INTRAVENOUS at 22:46

## 2019-01-01 RX ADMIN — FAMOTIDINE 20 MG: 10 INJECTION INTRAVENOUS at 08:33

## 2019-01-01 RX ADMIN — FUROSEMIDE 40 MG: 10 INJECTION, SOLUTION INTRAVENOUS at 09:19

## 2019-01-01 RX ADMIN — PROPOFOL 50 MCG/KG/MIN: 10 INJECTION, EMULSION INTRAVENOUS at 04:16

## 2019-01-01 RX ADMIN — PROPOFOL 40 MCG/KG/MIN: 10 INJECTION, EMULSION INTRAVENOUS at 03:22

## 2019-01-01 RX ADMIN — TAZOBACTAM SODIUM AND PIPERACILLIN SODIUM 4.5 G: 500; 4 INJECTION, SOLUTION INTRAVENOUS at 06:16

## 2019-01-01 RX ADMIN — DORNASE ALFA 50 ML: 1 SOLUTION RESPIRATORY (INHALATION) at 10:24

## 2019-01-01 RX ADMIN — VANCOMYCIN HYDROCHLORIDE 1250 MG: 10 INJECTION, POWDER, LYOPHILIZED, FOR SOLUTION INTRAVENOUS at 20:17

## 2019-01-01 RX ADMIN — PROPOFOL 30 MCG/KG/MIN: 10 INJECTION, EMULSION INTRAVENOUS at 19:19

## 2019-01-01 RX ADMIN — ENOXAPARIN SODIUM 40 MG: 40 INJECTION SUBCUTANEOUS at 08:28

## 2019-01-01 RX ADMIN — PROPOFOL 35 MCG/KG/MIN: 10 INJECTION, EMULSION INTRAVENOUS at 10:50

## 2019-01-01 RX ADMIN — LEVOFLOXACIN 750 MG: 5 INJECTION, SOLUTION INTRAVENOUS at 09:12

## 2019-01-01 RX ADMIN — PROPOFOL 50 MCG/KG/MIN: 10 INJECTION, EMULSION INTRAVENOUS at 19:58

## 2019-01-01 RX ADMIN — IPRATROPIUM BROMIDE AND ALBUTEROL SULFATE 3 ML: 2.5; .5 SOLUTION RESPIRATORY (INHALATION) at 15:29

## 2019-01-01 RX ADMIN — PROPOFOL 50 MCG/KG/MIN: 10 INJECTION, EMULSION INTRAVENOUS at 06:09

## 2019-01-01 RX ADMIN — GUAIFENESIN 400 MG: 100 SOLUTION ORAL at 13:10

## 2019-01-01 RX ADMIN — PROPOFOL 30 MCG/KG/MIN: 10 INJECTION, EMULSION INTRAVENOUS at 05:01

## 2019-01-01 RX ADMIN — PROPOFOL 50 MCG/KG/MIN: 10 INJECTION, EMULSION INTRAVENOUS at 12:15

## 2019-01-01 RX ADMIN — ACETYLCYSTEINE 1.5 ML: 200 INHALANT RESPIRATORY (INHALATION) at 19:54

## 2019-01-01 RX ADMIN — IPRATROPIUM BROMIDE AND ALBUTEROL SULFATE 3 ML: 2.5; .5 SOLUTION RESPIRATORY (INHALATION) at 07:06

## 2019-01-01 RX ADMIN — ENOXAPARIN SODIUM 40 MG: 40 INJECTION SUBCUTANEOUS at 08:22

## 2019-01-01 RX ADMIN — PROPOFOL 25 MCG/KG/MIN: 10 INJECTION, EMULSION INTRAVENOUS at 11:53

## 2019-01-01 RX ADMIN — VANCOMYCIN HYDROCHLORIDE 1250 MG: 10 INJECTION, POWDER, LYOPHILIZED, FOR SOLUTION INTRAVENOUS at 08:53

## 2019-01-01 RX ADMIN — PROPOFOL 45 MCG/KG/MIN: 10 INJECTION, EMULSION INTRAVENOUS at 21:57

## 2019-01-01 RX ADMIN — LEVOFLOXACIN 750 MG: 5 INJECTION, SOLUTION INTRAVENOUS at 02:53

## 2019-01-01 RX ADMIN — DORNASE ALFA 50 ML: 1 SOLUTION RESPIRATORY (INHALATION) at 21:27

## 2019-01-01 RX ADMIN — GUAIFENESIN 400 MG: 100 SOLUTION ORAL at 22:50

## 2019-01-01 RX ADMIN — GUAIFENESIN 400 MG: 100 SOLUTION ORAL at 21:52

## 2019-01-01 RX ADMIN — PROPOFOL 35 MCG/KG/MIN: 10 INJECTION, EMULSION INTRAVENOUS at 01:31

## 2019-01-01 RX ADMIN — DORNASE ALFA 50 ML: 1 SOLUTION RESPIRATORY (INHALATION) at 11:13

## 2019-01-01 RX ADMIN — SODIUM CHLORIDE, PRESERVATIVE FREE 3 ML: 5 INJECTION INTRAVENOUS at 09:17

## 2019-01-01 RX ADMIN — GUAIFENESIN 400 MG: 100 SOLUTION ORAL at 06:30

## 2019-01-01 RX ADMIN — IPRATROPIUM BROMIDE AND ALBUTEROL SULFATE 3 ML: 2.5; .5 SOLUTION RESPIRATORY (INHALATION) at 23:09

## 2019-01-01 RX ADMIN — PROPOFOL 35 MCG/KG/MIN: 10 INJECTION, EMULSION INTRAVENOUS at 00:37

## 2019-01-01 RX ADMIN — FAMOTIDINE 20 MG: 10 INJECTION INTRAVENOUS at 08:29

## 2019-01-01 RX ADMIN — FAMOTIDINE 20 MG: 10 INJECTION INTRAVENOUS at 20:46

## 2019-01-01 RX ADMIN — IPRATROPIUM BROMIDE AND ALBUTEROL SULFATE 3 ML: 2.5; .5 SOLUTION RESPIRATORY (INHALATION) at 18:57

## 2019-01-01 RX ADMIN — IPRATROPIUM BROMIDE AND ALBUTEROL SULFATE 3 ML: 2.5; .5 SOLUTION RESPIRATORY (INHALATION) at 15:19

## 2019-01-01 RX ADMIN — MEROPENEM 2 G: 1 INJECTION, POWDER, FOR SOLUTION INTRAVENOUS at 16:05

## 2019-01-01 RX ADMIN — PROPOFOL 30 MCG/KG/MIN: 10 INJECTION, EMULSION INTRAVENOUS at 00:52

## 2019-01-01 RX ADMIN — IPRATROPIUM BROMIDE 0.5 MG: 0.5 SOLUTION RESPIRATORY (INHALATION) at 07:59

## 2019-01-01 RX ADMIN — TAZOBACTAM SODIUM AND PIPERACILLIN SODIUM 4.5 G: 500; 4 INJECTION, SOLUTION INTRAVENOUS at 22:50

## 2019-01-01 RX ADMIN — GUAIFENESIN 400 MG: 100 SOLUTION ORAL at 21:05

## 2019-01-01 RX ADMIN — FENTANYL CITRATE 0.5 MCG/KG/HR: 50 INJECTION, SOLUTION INTRAMUSCULAR; INTRAVENOUS at 12:18

## 2019-01-01 RX ADMIN — MORPHINE SULFATE 2 MG: 2 INJECTION, SOLUTION INTRAMUSCULAR; INTRAVENOUS at 07:10

## 2019-01-01 RX ADMIN — ENOXAPARIN SODIUM 40 MG: 40 INJECTION SUBCUTANEOUS at 08:00

## 2019-01-01 RX ADMIN — SODIUM CHLORIDE 50 ML/HR: 9 INJECTION, SOLUTION INTRAVENOUS at 04:59

## 2019-01-01 RX ADMIN — PROPOFOL 25 MCG/KG/MIN: 10 INJECTION, EMULSION INTRAVENOUS at 11:40

## 2019-01-01 RX ADMIN — VANCOMYCIN HYDROCHLORIDE 1250 MG: 10 INJECTION, POWDER, LYOPHILIZED, FOR SOLUTION INTRAVENOUS at 13:41

## 2019-01-01 RX ADMIN — ENOXAPARIN SODIUM 40 MG: 40 INJECTION SUBCUTANEOUS at 08:17

## 2019-01-01 RX ADMIN — PROPOFOL 35 MCG/KG/MIN: 10 INJECTION, EMULSION INTRAVENOUS at 18:00

## 2019-01-01 RX ADMIN — FAMOTIDINE 20 MG: 10 INJECTION INTRAVENOUS at 08:01

## 2019-01-01 RX ADMIN — CEFEPIME 2 G: 2 INJECTION, POWDER, FOR SOLUTION INTRAVENOUS at 16:54

## 2019-01-01 RX ADMIN — SODIUM CHLORIDE, PRESERVATIVE FREE 3 ML: 5 INJECTION INTRAVENOUS at 21:05

## 2019-01-01 RX ADMIN — TAZOBACTAM SODIUM AND PIPERACILLIN SODIUM 4.5 G: 500; 4 INJECTION, SOLUTION INTRAVENOUS at 15:57

## 2019-01-01 RX ADMIN — DORNASE ALFA 50 ML: 1 SOLUTION RESPIRATORY (INHALATION) at 09:37

## 2019-01-01 RX ADMIN — PROPOFOL 35 MCG/KG/MIN: 10 INJECTION, EMULSION INTRAVENOUS at 05:25

## 2019-01-01 RX ADMIN — GUAIFENESIN 400 MG: 100 SOLUTION ORAL at 05:26

## 2019-01-01 RX ADMIN — VANCOMYCIN HYDROCHLORIDE 1250 MG: 10 INJECTION, POWDER, LYOPHILIZED, FOR SOLUTION INTRAVENOUS at 08:22

## 2019-01-01 RX ADMIN — PROPOFOL 50 MCG/KG/MIN: 10 INJECTION, EMULSION INTRAVENOUS at 02:57

## 2019-01-01 RX ADMIN — PROPOFOL 50 MCG/KG/MIN: 10 INJECTION, EMULSION INTRAVENOUS at 16:21

## 2019-01-01 RX ADMIN — DORNASE ALFA 50 ML: 1 SOLUTION RESPIRATORY (INHALATION) at 20:29

## 2019-01-01 RX ADMIN — PROPOFOL 40 MCG/KG/MIN: 10 INJECTION, EMULSION INTRAVENOUS at 21:05

## 2019-01-01 RX ADMIN — PROPOFOL 40 MCG/KG/MIN: 10 INJECTION, EMULSION INTRAVENOUS at 18:22

## 2019-01-01 RX ADMIN — VANCOMYCIN HYDROCHLORIDE 1250 MG: 10 INJECTION, POWDER, LYOPHILIZED, FOR SOLUTION INTRAVENOUS at 03:51

## 2019-01-01 RX ADMIN — ALBUTEROL SULFATE 2.5 MG: 2.5 SOLUTION RESPIRATORY (INHALATION) at 07:59

## 2019-01-01 RX ADMIN — IPRATROPIUM BROMIDE AND ALBUTEROL SULFATE 3 ML: 2.5; .5 SOLUTION RESPIRATORY (INHALATION) at 15:38

## 2019-01-01 RX ADMIN — FAMOTIDINE 20 MG: 10 INJECTION INTRAVENOUS at 08:53

## 2019-01-01 RX ADMIN — ENOXAPARIN SODIUM 40 MG: 40 INJECTION SUBCUTANEOUS at 09:20

## 2019-01-01 RX ADMIN — SODIUM CHLORIDE 13 UNITS/HR: 9 INJECTION, SOLUTION INTRAVENOUS at 19:19

## 2019-01-01 RX ADMIN — CHLORHEXIDINE GLUCONATE 15 ML: 1.2 RINSE ORAL at 00:48

## 2019-01-01 RX ADMIN — PROPOFOL 35 MCG/KG/MIN: 10 INJECTION, EMULSION INTRAVENOUS at 15:57

## 2019-01-01 RX ADMIN — CEFEPIME 2 G: 2 INJECTION, POWDER, FOR SOLUTION INTRAVENOUS at 00:19

## 2019-01-01 RX ADMIN — PROPOFOL 40 MCG/KG/MIN: 10 INJECTION, EMULSION INTRAVENOUS at 04:06

## 2019-01-15 PROBLEM — J96.02 ACUTE RESPIRATORY FAILURE WITH HYPERCAPNIA (HCC): Status: ACTIVE | Noted: 2019-01-01

## 2019-01-15 PROBLEM — J18.9 PNA (PNEUMONIA): Status: ACTIVE | Noted: 2019-01-01

## 2019-01-16 NOTE — PROGRESS NOTES
Discharge Planning Assessment  [facility]     Patient Name: Alan Carlin  MRN: [id number]  Today's Date: 1/16/2019    Admit Date: 1/15/2019    Discharge Needs Assessment     Row Name 01/16/19 1132       Living Environment    Lives With  spouse    Current Living Arrangements  home/apartment/condo    Primary Care Provided by  self    Provides Primary Care For  no one    Family Caregiver if Needed  spouse    Quality of Family Relationships  supportive    Able to Return to Prior Arrangements  yes       Resource/Environmental Concerns    Resource/Environmental Concerns  none    Transportation Concerns  car, none       Transition Planning    Patient/Family Anticipates Transition to  home with family    Patient/Family Anticipated Services at Transition      Transportation Anticipated  family or friend will provide       Discharge Needs Assessment    Readmission Within the Last 30 Days  no previous admission in last 30 days    Concerns to be Addressed  discharge planning    Equipment Currently Used at Home  bipap/cpap    Current Discharge Risk  chronically ill    Discharge Coordination/Progress  Patient admitted from home where he resides with his spouse.  Patient was independent prior to admission, worked, drives, etc.  Patient has a PCP and RX coverage.  Patient currently in ICU on vent.  Needs unclear at this time.  SW will follow to determine plan/needs.        Discharge Plan    No documentation.       Destination      No service coordination in this encounter.      Durable Medical Equipment      No service coordination in this encounter.      Dialysis/Infusion      No service coordination in this encounter.      Home Medical Care      No service coordination in this encounter.      Community Resources      No service coordination in this encounter.          Demographic Summary    No documentation.       Functional Status    No documentation.       Psychosocial    No documentation.       Abuse/Neglect     No documentation.       Legal    No documentation.       Substance Abuse    No documentation.       Patient Forms    No documentation.           DARLYN Ashraf

## 2019-01-16 NOTE — CONSULTS
"Pharmacy Dosing Service  Pharmacokinetics  Vancomycin Initial Evaluation    Assessment/Action/Plan:  Initiated Vancomycin 2,000 mg IVPB once, followed by 1,250 mg every 12 hours. Vancomycin levels not ordered at this time. Pharmacy will monitor renal function and adjust dose accordingly.     Subjective:  Alan Carlin is a 58 y.o. male with a Vancomycin \"Pharmacy to Dose\" consult for the treatment of Pneumonia .(x7 days)    Complicating factors:  Diabetes mellitus  Acute renal insufficiency  Morbid obesity    Objective:  Ht: 175.3 cm (69\"); Wt: (!) 154 kg (340 lb 4.8 oz)  Estimated Creatinine Clearance: 62.6 mL/min (A) (by C-G formula based on SCr of 1.87 mg/dL (H)).   Lab Results   Component Value Date    CREATININE 1.87 (H) 01/15/2019      Lab Results   Component Value Date    WBC 25.17 (H) 01/15/2019      Baseline culture results:  Microbiology Results     Collected  Updated  Procedure      01/15/2019 2009  01/15/2019 2013  Respiratory Culture - Sputum, ET Suction [[id number]]   Sputum from ET Suction               Loc Davies [facility]  01/16/19 12:37 AM    "

## 2019-01-16 NOTE — H&P
[facility] Medicine Services  HISTORY AND PHYSICAL    Date of Admission: 1/15/2019  Primary Care Physician: Francy Dumont MD    Subjective     Chief Complaint: Patient intubated pneumonia sepsis    History of Present Illness  Patient is a 58-year-old white male past medical history of obstructive sleep apnea uses CPAP as well as type II diabetes and hypertension.  He presents to [facility] with from what his wife says a very long period of illness going on since November.  Apparently he has had upper respiratory type infections that he has not been able to get over.  He has been on antibiotics and steroids off and on for several weeks.  Finally patient states he got to a point where he could not even breathe.  He was sick but actually went to work as a  on Friday.  He subsequently got to the point where he could walk across the room.  He has had a cough chronically and is gotten worse in the last few days.  When he presented to the emergency room there he was found to have a large left lower lobe infiltrate consistent with pneumonia.  He was admitted started on Rocephin and Zithromax.  He subsequently was also bolused with IV fluids per sepsis protocol.  He had a worsening respiratory failure requiring intubation and his chest x-ray showed a large left-sided pneumonia infiltrate and probably effusion taking a probably two thirds of his long.  Repeat chest x-ray here looks even worse with probably 20% of his long visible in the upper lobe.  Please see report below.  He was hypotensive as well recurrent and is on dobutamine.  Patient has very poor IV access with a right antecubital 20-gauge and a 20-gauge in his foot.  He has a 20 year pack history of smoking but has not smoked in 20 years.  He does work as a  so he works obviously had a cold and moist environment all the time.  He also had an elevated PCO2 and is barely satting above 92% on 100% FiO2.   Interesting of note when he lower that of his bed down very much he desats his face turns bright red and he starts bucking and fighting the vent.        Review of Systems   Able to obtain due to patient is intubated on ventilator  Otherwise complete ROS reviewed and negative except as mentioned in the HPI.    Past Medical History:   Past Medical History:   Diagnosis Date   • Dyslipidemia    • Elevated blood pressure    • Essential hypertension    • Type II diabetes mellitus, uncontrolled (CMS/HCC)      Past Surgical History:No past surgical history on file.  Social History:  reports that he has quit smoking. he has never used smokeless tobacco. He reports that he does not drink alcohol.    Family History: family history includes Diabetes in his other; Thyroid disease in his other.   Patient has diabetes and all of his siblings he also has a twin brother who  in his 40s from a cardiomyopathy    Allergies:  No Known Allergies  Medications:  Prior to Admission medications    Medication Sig Start Date End Date Taking? Authorizing Provider   atenolol (TENORMIN) 50 MG tablet Take 50 mg by mouth Daily.    Provider, MD Wayne   Empagliflozin (JARDIANCE) 10 MG tablet Take 10 mg by mouth Daily. 10/29/18   Jamshid Brunson APRN   Insulin Degludec (TRESIBA FLEXTOUCH) 200 UNIT/ML solution pen-injector Inject 80 Units under the skin into the appropriate area as directed Daily. 10/29/18   Jamshid Brunson APRN   Insulin Lispro (HUMALOG KWIKPEN) 100 UNIT/ML solution pen-injector Inject 40 Units under the skin into the appropriate area as directed 3 (Three) Times a Day. 18   Jamshid Brunson APRN   Insulin Pen Needle (B-D ULTRAFINE III SHORT PEN) 31G X 8 MM misc Inject 4 times daily 10/29/18   Jamshid Brunson APRN   lisinopril-hydrochlorothiazide (PRINZIDE,ZESTORETIC) 20-25 MG per tablet Take 1 tablet by mouth Daily.    Provider, MD Wayne   metFORMIN (GLUCOPHAGE) 1000 MG tablet Take  1 tablet by mouth 2 (Two) Times a Day. 10/29/18   Jamshid Brunson APRN   ONE TOUCH ULTRA TEST test strip TEST FOUR TIMES DAILY 11/26/18   Jamshid Brunson APRN   rosuvastatin (CRESTOR) 5 MG tablet Take 1 tablet by mouth Every Night. 10/30/18 10/30/19  Jamshid Brunson APRN     Objective     Vital Signs: /77   Pulse 118   Temp 98 °F (36.7 °C) (Axillary)   Wt (!) 154 kg (340 lb 4.8 oz)   SpO2 95%   BMI 48.83 kg/m²   Physical Exam  Gen.  Morbidly obese white male intubated sedated  HEENT: Atraumatic normocephalic pupils equal round reactive to light extraocular movements intact sclerae anicteric TMs negative mucous membranes moist neck is supple without lymphadenopathy ET tube in place as it JVD is noted no carotid bruits are auscultated oropharynx is without erythema or exudate  Chest: Almost no air movement or breath sounds on the left except for at the apex of the lung coarse breath sounds in right lung  CV: Tachycardic rate and regular rhythm normal S1-S2 no gallops murmurs or rubs  Abdomen: Protuberant nontender nondistended active bowel sounds no hepatosplenomegaly no masses no hernias  Extremities: No clubbing edema or cyanosis capillary refill is normal pulses are 2+ and symmetric at radial and dorsalis pedis posterior tibial pulses are intact and 2+ palpable  Neuro: Patient is intubated and sedated DTRs are 2+ and symmetric unable to perform her cooperate with rest of exam  Skin: Cool dry and intact no evidence of breakdown  Sensorium: Unable to assess  Nursing notes and vital signs reviewed        Results Reviewed:  Lab Results (last 24 hours)     Procedure Component Value Units Date/Time    Blood Gas, Arterial [[id number]]  (Abnormal) Collected:  01/15/19 2010    Specimen:  Arterial Blood Updated:  01/15/19 2013     Site Right Radial     Devon's Test Positive     pH, Arterial 7.327 pH units      Comment: 84 Value below reference range        pCO2, Arterial 57.3 mm Hg       Comment: 83 Value above reference range        pO2, Arterial 68.1 mm Hg      Comment: 84 Value below reference range        HCO3, Arterial 30.0 mmol/L      Comment: 83 Value above reference range        Base Excess, Arterial 2.7 mmol/L      Comment: 83 Value above reference range        O2 Saturation, Arterial 92.5 %      Comment: 84 Value below reference range        Temperature 37.0 C      Barometric Pressure for Blood Gas 757 mmHg      Modality Ventilator     FIO2 100 %      Ventilator Mode AC     Set Tidal Volume 650     Set Mech Resp Rate 20.0     PEEP 5.0     Collected by [id number]     Comment: Meter: [id number]     :  [id number]       Respiratory Culture - Sputum, ET Suction [[id number]] Collected:  01/15/19 2009    Specimen:  Sputum from ET Suction Updated:  01/15/19 2013        Imaging Results (last 24 hours)     Procedure Component Value Units Date/Time    XR Chest 1 View [[id number]] Collected:  01/15/19 2003     Updated:  01/15/19 2008    Narrative:       EXAMINATION: Chest 1 view 1/15/2019     HISTORY: Respiratory failure. Mechanical ventilation     FINDINGS: Endotracheal tube projects at the T3 level. There is volume  loss on the left with mediastinal shift to the left. There is  consolidation within the medial aspect of the left upper lobe as well as  consolidation of the left lower lobe with silhouetting of the left  hemidiaphragm. It is difficult to ascertain whether this represents  atelectasis related to mucoid impaction versus a neoplastic process. I  would suggest follow-up with an enhanced CT of the chest for further  assessment. There is also a nodular opacity within the right midlung  zone which could be infectious in nature but which could also be  assessed at the time of CT imaging. The right lung is otherwise clear.       Impression:       1.. Consolidation and volume loss within both the left upper and left  lower lobe with silhouetting of the left diaphragm, heart and  left  mediastinum. It is difficult to ascertain whether this represents mucoid  impaction with partial collapse of segments of the left lung versus a  neoplastic process. CT imaging with IV contrast is recommended.  2. Nodular opacity within the right midlung zone either related to  ongoing infiltrate versus a neoplastic process.  3. Endotracheal tube well-positioned.  This report was finalized on 01/15/2019 20:05 by Dr. Rudy Ahmadi MD.        I have personally reviewed and interpreted the radiology studies and ECG obtained at time of admission.     Assessment / Plan     Assessment:   1.  Community-acquired left upper and left lower lung field pneumonia possible mucus plugging  2.  Acute respiratory failure with hypoxia and hypercapnia requiring intubation and ventilation  3.  Type II diabetes with hyperglycemia  4.  Acute kidney injury with creatinine of 1.8.  GFR of 37 was 1.6 at [facility] hospital  5.  Sepsis  6.  Hypotension requiring pressors  7.  Poor venous access      Plan:    1.  Admit patient to intensive care unit continue gentle hydration and pressors will get CT scan of chest noncontrast change antibiotics to vancomycin and cefepime and Levaquin.  We will repeat cultures of blood and sputum will also get urine antigens for Legionella and strep pneumo.  Will also get pulmonary consultation patient may need bronchoscopy.  Also for ventilator management.  2.  Continue intubation and ventilation pulmonary consult as stated nebulizer treatments as needed mucolytic therapy.  We will place OG tube is well.  3.  Aggressive glucose management will start insulin drip once central line is placed per critical care protocol.  We sliding scale insulin in the meantime.  Check hemoglobin A1c serial Accu-Cheks.  4.  Check renal ultrasound monitor urine output patient has Hammonds catheter already in place will continue that.  Will gently hydrate and watch to try to avoid nephrotoxic agents. Consult nephrology if  creatinine increases  5.  Patient has been bolused previously a do not believe he needs any more aggressive fluids and trying volume overloaded we will continue broad-spectrum antibiotics culture is obtained as above further plans as outlined above.  We will check lactic acid level.  6.  He was on dobutamine will change to Levophed.  7.  We will attempt to place central line if unable will place PICC order.   DVT prophylaxis with lovenox.    Code Status: Full    Patient seen prior to midnight     I discussed the patient's findings and my recommendations with the patient's wife and daughter.  His wife is his healthcare power advocate    Estimated length of stay to be determined    Emmanuel Palm MD   01/15/19   8:17 PM

## 2019-01-16 NOTE — PAYOR COMM NOTE
"FROM: JESSICA GORDON  PHONE: [phone]  FAX: [phone]        Alan Wheeler (58 y.o. Male)     Date of Birth Social Security Number Address Home Phone MRN    1960  [address] [facility] [address] [phone] [id number]    Holiness Marital Status          [employer]        Admission Date Admission Type Admitting Provider Attending Provider Department, Room/Bed    1/15/19 Urgent Aniceto Ott MD Fleming, John Eric, MD [facility] INTENSIVE CARE, I012/1    Discharge Date Discharge Disposition Discharge Destination                       Attending Provider:  Aniceto Ott MD    Allergies:  No Known Allergies    Isolation:  None   Infection:  None   Code Status:  CPR    Ht:  175.3 cm (69\")   Wt:  154 kg (340 lb 4.8 oz)    Admission Cmt:  None   Principal Problem:  None                Active Insurance as of 1/15/2019     Primary Coverage     Payor Plan Insurance Group Employer/Plan Group    [employer] [facility] [facility] [id number]     Payor Plan Address Payor Plan Phone Number Payor Plan Fax Number Effective Dates    [address] [phone]  6/1/2010 - None Entered    [facility] [id number]       Subscriber Name Subscriber Birth Date Member ID       FOUZIA WHEELER [birth year]1961 [id number]                 Emergency Contacts      (Rel.) Home Phone Work Phone Mobile Phone    Fouzia Wheeler (Spouse) [phone] --               History & Physical      Emmanuel Palm MD at 1/15/2019  8:17 PM              [facility]  HISTORY AND PHYSICAL    Date of Admission: 1/15/2019  Primary Care Physician: Francy Dumont MD    Subjective     Chief Complaint: Patient intubated pneumonia sepsis    History of Present Illness  Patient is a 58-year-old white male past medical history of obstructive sleep apnea uses CPAP as well as type II diabetes and hypertension.  He presents to " [facility] with from what his wife says a very long period of illness going on since November.  Apparently he has had upper respiratory type infections that he has not been able to get over.  He has been on antibiotics and steroids off and on for several weeks.  Finally patient states he got to a point where he could not even breathe.  He was sick but actually went to work as a  on Friday.  He subsequently got to the point where he could walk across the room.  He has had a cough chronically and is gotten worse in the last few days.  When he presented to the emergency room there he was found to have a large left lower lobe infiltrate consistent with pneumonia.  He was admitted started on Rocephin and Zithromax.  He subsequently was also bolused with IV fluids per sepsis protocol.  He had a worsening respiratory failure requiring intubation and his chest x-ray showed a large left-sided pneumonia infiltrate and probably effusion taking a probably two thirds of his long.  Repeat chest x-ray here looks even worse with probably 20% of his long visible in the upper lobe.  Please see report below.  He was hypotensive as well recurrent and is on dobutamine.  Patient has very poor IV access with a right antecubital 20-gauge and a 20-gauge in his foot.  He has a 20 year pack history of smoking but has not smoked in 20 years.  He does work as a  so he works obviously had a cold and moist environment all the time.  He also had an elevated PCO2 and is barely satting above 92% on 100% FiO2.  Interesting of note when he lower that of his bed down very much he desats his face turns bright red and he starts bucking and fighting the vent.        Review of Systems   Able to obtain due to patient is intubated on ventilator  Otherwise complete ROS reviewed and negative except as mentioned in the HPI.    Past Medical History:   Past Medical History:   Diagnosis Date   • Dyslipidemia    • Elevated blood pressure     • Essential hypertension    • Type II diabetes mellitus, uncontrolled (CMS/[facility])      Past Surgical History:No past surgical history on file.  Social History:  reports that he has quit smoking. he has never used smokeless tobacco. He reports that he does not drink alcohol.    Family History: family history includes Diabetes in his other; Thyroid disease in his other.   Patient has diabetes and all of his siblings he also has a twin brother who  in his 40s from a cardiomyopathy    Allergies:  No Known Allergies  Medications:  Prior to Admission medications    Medication Sig Start Date End Date Taking? Authorizing Provider   atenolol (TENORMIN) 50 MG tablet Take 50 mg by mouth Daily.    Provider, MD Wayne   Empagliflozin (JARDIANCE) 10 MG tablet Take 10 mg by mouth Daily. 10/29/18   Jamshid Brunson APRN   Insulin Degludec (TRESIBA FLEXTOUCH) 200 UNIT/ML solution pen-injector Inject 80 Units under the skin into the appropriate area as directed Daily. 10/29/18   Jamshid Brunson APRN   Insulin Lispro (HUMALOG KWIKPEN) 100 UNIT/ML solution pen-injector Inject 40 Units under the skin into the appropriate area as directed 3 (Three) Times a Day. 18   Jamshid Brunson APRN   Insulin Pen Needle (B-D ULTRAFINE III SHORT PEN) 31G X 8 MM misc Inject 4 times daily 10/29/18   Jamshid Brunson APRN   lisinopril-hydrochlorothiazide (PRINZIDE,ZESTORETIC) 20-25 MG per tablet Take 1 tablet by mouth Daily.    Provider, MD Wayne   metFORMIN (GLUCOPHAGE) 1000 MG tablet Take 1 tablet by mouth 2 (Two) Times a Day. 10/29/18   Jamshid Brunson APRN   ONE TOUCH ULTRA TEST test strip TEST FOUR TIMES DAILY 18   Jamshid Brunson APRN   rosuvastatin (CRESTOR) 5 MG tablet Take 1 tablet by mouth Every Night. 10/30/18 10/30/19  Jamshid Brunson APRN     Objective     Vital Signs: /77   Pulse 118   Temp 98 °F (36.7 °C) (Axillary)   Wt (!) 154 kg (340 lb 4.8 oz)    SpO2 95%   BMI 48.83 kg/m²    Physical Exam  Gen.  Morbidly obese white male intubated sedated  HEENT: Atraumatic normocephalic pupils equal round reactive to light extraocular movements intact sclerae anicteric TMs negative mucous membranes moist neck is supple without lymphadenopathy ET tube in place as it JVD is noted no carotid bruits are auscultated oropharynx is without erythema or exudate  Chest: Almost no air movement or breath sounds on the left except for at the apex of the lung coarse breath sounds in right lung  CV: Tachycardic rate and regular rhythm normal S1-S2 no gallops murmurs or rubs  Abdomen: Protuberant nontender nondistended active bowel sounds no hepatosplenomegaly no masses no hernias  Extremities: No clubbing edema or cyanosis capillary refill is normal pulses are 2+ and symmetric at radial and dorsalis pedis posterior tibial pulses are intact and 2+ palpable  Neuro: Patient is intubated and sedated DTRs are 2+ and symmetric unable to perform her cooperate with rest of exam  Skin: Cool dry and intact no evidence of breakdown  Sensorium: Unable to assess  Nursing notes and vital signs reviewed        Results Reviewed:  Lab Results (last 24 hours)     Procedure Component Value Units Date/Time    Blood Gas, Arterial [[id number]]  (Abnormal) Collected:  01/15/19 2010    Specimen:  Arterial Blood Updated:  01/15/19 2013     Site Right Radial     Devon's Test Positive     pH, Arterial 7.327 pH units      Comment: 84 Value below reference range        pCO2, Arterial 57.3 mm Hg      Comment: 83 Value above reference range        pO2, Arterial 68.1 mm Hg      Comment: 84 Value below reference range        HCO3, Arterial 30.0 mmol/L      Comment: 83 Value above reference range        Base Excess, Arterial 2.7 mmol/L      Comment: 83 Value above reference range        O2 Saturation, Arterial 92.5 %      Comment: 84 Value below reference range        Temperature 37.0 C      Barometric Pressure for Blood  Gas 757 mmHg      Modality Ventilator     FIO2 100 %      Ventilator Mode AC     Set Tidal Volume 650     Set Mech Resp Rate 20.0     PEEP 5.0     Collected by [id number]     Comment: Meter: [id number]     :  [id number]       Respiratory Culture - Sputum, ET Suction [[id number]] Collected:  01/15/19 2009    Specimen:  Sputum from ET Suction Updated:  01/15/19 2013        Imaging Results (last 24 hours)     Procedure Component Value Units Date/Time    XR Chest 1 View [[id number]] Collected:  01/15/19 2003     Updated:  01/15/19 2008    Narrative:       EXAMINATION: Chest 1 view 1/15/2019     HISTORY: Respiratory failure. Mechanical ventilation     FINDINGS: Endotracheal tube projects at the T3 level. There is volume  loss on the left with mediastinal shift to the left. There is  consolidation within the medial aspect of the left upper lobe as well as  consolidation of the left lower lobe with silhouetting of the left  hemidiaphragm. It is difficult to ascertain whether this represents  atelectasis related to mucoid impaction versus a neoplastic process. I  would suggest follow-up with an enhanced CT of the chest for further  assessment. There is also a nodular opacity within the right midlung  zone which could be infectious in nature but which could also be  assessed at the time of CT imaging. The right lung is otherwise clear.       Impression:       1.. Consolidation and volume loss within both the left upper and left  lower lobe with silhouetting of the left diaphragm, heart and left  mediastinum. It is difficult to ascertain whether this represents mucoid  impaction with partial collapse of segments of the left lung versus a  neoplastic process. CT imaging with IV contrast is recommended.  2. Nodular opacity within the right midlung zone either related to  ongoing infiltrate versus a neoplastic process.  3. Endotracheal tube well-positioned.  This report was finalized on 01/15/2019 20:05 by Dr. Grant  MD Galdino.        I have personally reviewed and interpreted the radiology studies and ECG obtained at time of admission.     Assessment / Plan     Assessment:   1.  Community-acquired left upper and left lower lung field pneumonia possible mucus plugging  2.  Acute respiratory failure with hypoxia and hypercapnia requiring intubation and ventilation  3.  Type II diabetes with hyperglycemia  4.  Acute kidney injury with creatinine of 1.8.  [facility]  5.  Sepsis  6.  Hypotension requiring pressors  7.  Poor venous access      Plan:    1.  Admit patient to intensive care unit continue gentle hydration and pressors will get CT scan of chest noncontrast change antibiotics to vancomycin and cefepime and Levaquin.  We will repeat cultures of blood and sputum will also get urine antigens for Legionella and strep pneumo.  Will also get pulmonary consultation patient may need bronchoscopy.  Also for ventilator management.  2.  Continue intubation and ventilation pulmonary consult as stated nebulizer treatments as needed mucolytic therapy.  We will place OG tube is well.  3.  Aggressive glucose management will start insulin drip once central line is placed per critical care protocol.  We sliding scale insulin in the meantime.  Check hemoglobin A1c serial Accu-Cheks.  4.  Check renal ultrasound monitor urine output patient has Hammonds catheter already in place will continue that.  Will gently hydrate and watch to try to avoid nephrotoxic agents. Consult nephrology if creatinine increases  5.  Patient has been bolused previously a do not believe he needs any more aggressive fluids and trying volume overloaded we will continue broad-spectrum antibiotics culture is obtained as above further plans as outlined above.  We will check lactic acid level.  6.  He was on dobutamine will change to Levophed.  7.  We will attempt to place central line if unable will place PICC order.   DVT prophylaxis with  lovenox.    Code Status: Full    Patient seen prior to midnight     I discussed the patient's findings and my recommendations with the patient's wife and daughter.  His wife is his healthcare power advocate    Estimated length of stay to be determined    Emmanuel Palm MD   01/15/19   8:17 PM              Electronically signed by Emmanuel Palm MD at 1/16/2019  2:44 AM       Emergency Department Notes     No notes of this type exist for this encounter.        ICU Vital Signs     Row Name 01/16/19 0815 01/16/19 0810 01/16/19 0758 01/16/19 0635 01/16/19 0615       Vitals    Pulse  --  103  103  98  100    Heart Rate Source  --  Monitor  Monitor  --  --    Resp  --  18  18  --  --    Resp Rate Source  --  Ventilator  Ventilator  --  --    Resp Rate (Observed) Vent  --  --  18  18  22    BP  --  --  --  118/77  116/74    Noninvasive MAP (mmHg)  --  --  --  88  87       Oxygen Therapy    SpO2  --  --  95 %  96 %  96 %    Pulse Oximetry Type  --  --  Continuous  --  --    Device (Oxygen Therapy)  ventilator  ventilator  ventilator  --  --    Oxygen Concentration (%)  70  90  90  --  --    ETCO2 (mmHg)  --  --  39 mmHg  35 mmHg  --    $ ETCO2 Daily Assessment (RT Only)  --  --  yes  --  --    Row Name 01/16/19 0600 01/16/19 0550 01/16/19 0535 01/16/19 0518 01/16/19 0505       Vitals    Pulse  98  97  97  99  97    Resp Rate (Observed) Vent  22 22  22  --  22    BP  117/77  116/76  89/63  (Abnormal)   120/78  122/82    Noninvasive MAP (mmHg)  90  89  72  91  94       Oxygen Therapy    SpO2  96 %  96 %  96 %  97 %  97 %    Pulse Oximetry Type  --  --  --  Continuous  --    Device (Oxygen Therapy)  --  --  --  ventilator  --    Oxygen Concentration (%)  --  --  --  90  --    Row Name 01/16/19 0450 01/16/19 0430 01/16/19 0415 01/16/19 0405 01/16/19 0400       Vitals    Pulse  94  100  97  97  --    Resp Rate (Observed) Vent  22 22 22  22  --    BP  117/75  108/70  107/74  110/71  --    Noninvasive MAP (mmHg)  85  83   84  82  --       Oxygen Therapy    SpO2  97 %  97 %  97 %  97 %  --    Device (Oxygen Therapy)  --  --  --  --  ventilator    Oxygen Concentration (%)  --  --  --  --  100    Row Name 01/16/19 0350 01/16/19 0335 01/16/19 0322 01/16/19 0320 01/16/19 0305       Vitals    Pulse  99  97  99  100  100    Heart Rate Source  --  --  Monitor  --  --    Resp  --  --  22  --  --    Resp Rate Source  --  --  Ventilator  --  --    Resp Rate (Observed) Vent  22 22 22 22  22    BP  114/82  112/74  --  115/75  100/76    Noninvasive MAP (mmHg)  90  85  --  86  83       Oxygen Therapy    SpO2  97 %  97 %  97 %  96 %  96 %    Pulse Oximetry Type  --  --  Continuous  --  --    Device (Oxygen Therapy)  --  --  ventilator  --  --    Oxygen Concentration (%)  --  --  100  --  --    ETCO2 (mmHg)  --  --  33 mmHg  --  32 mmHg    Row Name 01/16/19 0245 01/16/19 0230 01/16/19 0215 01/16/19 0205 01/16/19 0150       Vitals    Pulse  98  99  101  101  102    Resp Rate (Observed) Vent  22 22 22 22  22    BP  109/78  121/78  123/83  115/78  118/80    Noninvasive MAP (mmHg)  88  92  94  90  91       Oxygen Therapy    SpO2  97 %  96 %  97 %  98 %  98 %    Row Name 01/16/19 0135 01/16/19 0045 01/16/19 0030 01/16/19 0015 01/16/19 0005       Vitals    Pulse  100  98  99  100  103    Resp Rate (Observed) Vent  22 22 22 22  22    BP  88/64  (Abnormal)   93/67  123/80  124/82  127/94    Noninvasive MAP (mmHg)  72  75  94  95  98       Oxygen Therapy    SpO2  98 %  97 %  97 %  97 %  96 %    Device (Oxygen Therapy)  --  --  --  ventilator  --    Oxygen Concentration (%)  --  --  --  100  --    Row Name 01/16/19 0000 01/15/19 2350 01/15/19 2335 01/15/19 2315 01/15/19 2233       Vitals    Temp  97.8 °F (36.6 °C)  --  --  --  --    Temp src  Axillary  --  --  --  --    Pulse  --  100  102  115  99    Heart Rate Source  --  --  --  --  Monitor    Resp  --  --  --  --  16    Resp Rate Source  --  --  --  --  Ventilator    Resp Rate (Observed) Vent  --  " 22  22  16  16    BP  --  117/86  101/77  91/70  123/88    Noninvasive MAP (mmHg)  --  94  84  77  99       Oxygen Therapy    SpO2  --  96 %  93 %  95 %  93 %    Pulse Oximetry Type  --  --  --  --  Continuous    Device (Oxygen Therapy)  --  --  --  --  ventilator    Oxygen Concentration (%)  --  --  --  --  100    ETCO2 (mmHg)  --  --  --  --  35 mmHg    Row Name 01/15/19 2230 01/15/19 2220 01/15/19 2205 01/15/19 2150 01/15/19 2140       Vitals    Pulse  103  100  99  85  94    Resp Rate (Observed) Vent  16  16  16  16  16    BP  123/88  134/87  150/91  151/81  130/80    Noninvasive MAP (mmHg)  99  101  108  100  95       Oxygen Therapy    SpO2  93 %  92 %  90 %  88 %  (Abnormal)   90 %    Row Name 01/15/19 2130 01/15/19 2100 01/15/19 2052 01/15/19 2045 01/15/19 2020       Vitals    Pulse  105  96  --  101  108    Resp Rate (Observed) Vent  16  16  16  20  20    BP  80/60  (Abnormal)   131/86  --  122/77  125/77    Noninvasive MAP (mmHg)  67  99  --  91  91       Oxygen Therapy    SpO2  89 %  (Abnormal)   94 %  --  93 %  93 %    Row Name 01/15/19 2005 01/15/19 1950 01/15/19 1935 01/15/19 1930 01/15/19 1900       Height and Weight    Height  --  --  --  175.3 cm (69\")  --    Weight  --  --  --  154 kg (340 lb 4.8 oz)  (Abnormal)   --    Weight Method  --  --  --  Bed scale  --    Ideal Body Weight (IBW) (kg)  --  --  --  73.69  --    BSA (Calculated - sq m)  --  --  --  2.59 sq meters  --    BMI (Calculated)  --  --  --  50.2  --    Weight in (lb) to have BMI = 25  --  --  --  168.9  --       Vitals    Temp  98 °F (36.7 °C)  --  --  98 °F (36.7 °C)  --    Temp src  Axillary  --  --  Axillary  --    Pulse  118  113  126  (Abnormal)   127  (Abnormal)   --    Heart Rate Source  --  --  --  Monitor  --    Resp  --  --  --  20  --    Resp Rate Source  --  --  --  Ventilator  --    Resp Rate (Observed) Vent  20  20  20  20  --    BP  121/77  100/75  150/93  150/93  --    Noninvasive MAP (mmHg)  89  84  109  109  --       " "Oxygen Therapy    SpO2  95 %  93 %  95 %  95 %  --    Pulse Oximetry Type  --  --  --  Continuous  --    Device (Oxygen Therapy)  --  --  --  ventilator  --    Oxygen Concentration (%)  --  --  --  100  --    ETCO2 (mmHg)  --  --  --  32 mmHg  --    $ ETCO2 Daily Assessment (RT Only)  --  --  --  yes  --        Hospital Medications (all)       Dose Frequency Start End    acetaminophen (TYLENOL) suppository 650 mg 650 mg Every 4 Hours PRN 1/16/2019     Sig - Route: Insert 1 suppository into the rectum Every 4 (Four) Hours As Needed for Fever (temperature greater than 101.5 F or headache). - Rectal    Linked Group 1:  \"Or\" Linked Group Details        acetaminophen (TYLENOL) tablet 650 mg 650 mg Every 4 Hours PRN 1/16/2019     Sig - Route: Take 2 tablets by mouth Every 4 (Four) Hours As Needed for Headache or Fever (fever greater than 101.5 F). - Oral    Linked Group 1:  \"Or\" Linked Group Details        albuterol (PROVENTIL) nebulizer solution 0.083% 2.5 mg/3mL 2.5 mg Once As Needed 1/16/2019     Sig - Route: Take 2.5 mg by nebulization 1 (One) Time As Needed for Shortness of Air (Sputum Induction). - Nebulization    cefepime (MAXIPIME) 2 g/100 mL 0.9% NS (mbp) 2 g Once 1/16/2019 1/16/2019    Sig - Route: Infuse 100 mL into a venous catheter 1 (One) Time. - Intravenous    cefepime (MAXIPIME) 2 g/100 mL 0.9% NS (mbp) 2 g Every 8 Hours 1/16/2019 1/23/2019    Sig - Route: Infuse 100 mL into a venous catheter Every 8 (Eight) Hours. - Intravenous    dextrose (D50W) 25 g/ 50mL Intravenous Solution 25-50 mL 25-50 mL Every 30 Minutes PRN 1/16/2019     Sig - Route: Infuse 25-50 mL into a venous catheter Every 30 (Thirty) Minutes As Needed for Low Blood Sugar (Blood Glucose <70 mg/dL; Per Insulin Infusion Protocol). - Intravenous    enoxaparin (LOVENOX) syringe 40 mg 40 mg Every 24 Hours 1/16/2019     Sig - Route: Inject 0.4 mL under the skin into the appropriate area as directed Daily. - Subcutaneous    famotidine (PEPCID) " "injection 20 mg 20 mg 2 Times Daily 1/16/2019     Sig - Route: Infuse 2 mL into a venous catheter 2 (Two) Times a Day. - Intravenous    guaiFENesin (ROBITUSSIN) 100 MG/5ML oral solution 400 mg 400 mg Every 8 Hours Scheduled 1/16/2019     Sig - Route: Take 20 mL by mouth Every 8 (Eight) Hours. - Oral    insulin regular (HumuLIN R,NovoLIN R) 100 Units in Sodium chloride 0.9 % 100 mL (1 Units/mL) infusion 1-20 Units/hr Titrated 1/16/2019     Sig - Route: Infuse 1-20 Units/hr into a venous catheter Dose Adjusted By Provider As Needed. - Intravenous    ipratropium (ATROVENT) nebulizer solution 0.5 mg 0.5 mg Every 4 Hours PRN 1/16/2019     Sig - Route: Take 2.5 mL by nebulization Every 4 (Four) Hours As Needed for Shortness of Air. - Nebulization    ipratropium-albuterol (DUO-NEB) nebulizer solution 3 mL 3 mL Every 4 Hours - RT 1/16/2019     Sig - Route: Take 3 mL by nebulization Every 4 (Four) Hours. - Nebulization    levoFLOXacin (LEVAQUIN) 750 mg/150 mL D5W (premix) (LEVAQUIN) 750 mg 750 mg Every 24 Hours 1/16/2019 1/23/2019    Sig - Route: Infuse 150 mL into a venous catheter Daily. - Intravenous    Linked Group 2:  \"And\" Linked Group Details        lidocaine (XYLOCAINE) 1 % injection  - ADS Override Pull   1/15/2019 1/15/2019    Notes to Pharmacy: Created by cabinet override    lidocaine (XYLOCAINE) 1 % injection  - ADS Override Pull   1/16/2019 1/16/2019    Notes to Pharmacy: Created by cabinet override    lidocaine (XYLOCAINE) 1 % injection  - ADS Override Pull   1/16/2019 1/16/2019    Notes to Pharmacy: Created by cabinet override    Morphine (PF) 10 MG/ML injection  - ADS Override Pull   1/16/2019 1/16/2019    Notes to Pharmacy: Created by cabinet override    Morphine injection 5 mg 5 mg Once 1/16/2019     Sig - Route: Infuse 0.5 mL into a venous catheter 1 (One) Time. - Intravenous    Morphine sulfate (PF) 2 MG/ML injection  - ADS Override Pull   1/16/2019 1/16/2019    Notes to Pharmacy: Created by cabinet " "override    Morphine sulfate (PF) injection 2 mg 2 mg Once 1/16/2019     Sig - Route: Infuse 1 mL into a venous catheter 1 (One) Time. - Intravenous    norepinephrine (LEVOPHED) 8,000 mcg in Sodium chloride 0.9 % 250 mL (32 mcg/mL) infusion 0.02-0.3 mcg/kg/min × 154 kg Titrated 1/15/2019     Sig - Route: Infuse 3.08-46.2 mcg/min into a venous catheter Dose Adjusted By Provider As Needed. - Intravenous    ondansetron (ZOFRAN) injection 4 mg 4 mg Every 6 Hours PRN 1/16/2019     Sig - Route: Infuse 2 mL into a venous catheter Every 6 (Six) Hours As Needed for Nausea or Vomiting. - Intravenous    Linked Group 3:  \"Or\" Linked Group Details        ondansetron (ZOFRAN) tablet 4 mg 4 mg Every 6 Hours PRN 1/16/2019     Sig - Route: Take 1 tablet by mouth Every 6 (Six) Hours As Needed for Nausea or Vomiting. - Oral    Linked Group 3:  \"Or\" Linked Group Details        ondansetron ODT (ZOFRAN-ODT) disintegrating tablet 4 mg 4 mg Every 6 Hours PRN 1/16/2019     Sig - Route: Take 1 tablet by mouth Every 6 (Six) Hours As Needed for Nausea or Vomiting. - Oral    Linked Group 3:  \"Or\" Linked Group Details        propofol (DIPRIVAN) infusion 10 mg/mL 100 mL 5-50 mcg/kg/min × 154 kg Titrated 1/15/2019     Sig - Route: Infuse 770-7,700 mcg/min into a venous catheter Dose Adjusted By Provider As Needed. - Intravenous    sodium chloride 0.9 % flush 3 mL 3 mL Every 12 Hours Scheduled 1/16/2019     Sig - Route: Infuse 3 mL into a venous catheter Every 12 (Twelve) Hours. - Intravenous    sodium chloride 0.9 % flush 3-10 mL 3-10 mL As Needed 1/16/2019     Sig - Route: Infuse 3-10 mL into a venous catheter As Needed for Line Care. - Intravenous    Sodium chloride 0.9 % infusion 50 mL/hr Continuous 1/16/2019     Sig - Route: Infuse 50 mL/hr into a venous catheter Continuous. - Intravenous    vancomycin (VANCOCIN) 2,000 mg in Sodium chloride 0.9 % 500 mL IVPB 2,000 mg Once 1/16/2019 1/16/2019    Sig - Route: Infuse 2,000 mg into a venous " "catheter 1 (One) Time. - Intravenous    Linked Group 4:  \"Followed by\" Linked Group Details        vancomycin 1250 mg/250 mL 0.9% NS IVPB ([facility]) 1,250 mg Every 12 Hours 1/16/2019 1/23/2019    Sig - Route: Infuse 250 mL into a venous catheter Every 12 (Twelve) Hours. - Intravenous    Linked Group 4:  \"Followed by\" Linked Group Details        albuterol (PROVENTIL) nebulizer solution 0.083% 2.5 mg/3mL (Discontinued) 2.5 mg Every 4 Hours PRN 1/16/2019 1/16/2019    Sig - Route: Take 2.5 mg by nebulization Every 4 (Four) Hours As Needed for Shortness of Air. - Nebulization    cefepime (MAXIPIME) 2 g/100 mL 0.9% NS (mbp) (Discontinued) 2 g Every 8 Hours Scheduled 1/16/2019 1/16/2019    Sig - Route: Infuse 100 mL into a venous catheter Every 8 (Eight) Hours. - Intravenous    Reason for Discontinue: Dose adjustment    Linked Group 2:  \"And\" Linked Group Details        chlorhexidine (PERIDEX) 0.12 % solution 15 mL (Discontinued) 15 mL Every 12 Hours Scheduled 1/16/2019 1/16/2019    Sig - Route: Apply 15 mL to the mouth or throat Every 12 (Twelve) Hours. - Mouth/Throat    dextrose (D50W) 25 g/ 50mL Intravenous Solution 25 g (Discontinued) 25 g Every 15 Minutes PRN 1/16/2019 1/16/2019    Sig - Route: Infuse 50 mL into a venous catheter Every 15 (Fifteen) Minutes As Needed for Low Blood Sugar (Blood Sugar Less Than 70). - Intravenous    dextrose (D50W) 25 g/ 50mL Intravenous Solution 25 g (Discontinued) 25 g Every 15 Minutes PRN 1/16/2019 1/16/2019    Sig - Route: Infuse 50 mL into a venous catheter Every 15 (Fifteen) Minutes As Needed for Low Blood Sugar (Blood Sugar Less Than 70). - Intravenous    Reason for Discontinue: Duplicate order    dextrose (GLUTOSE) oral gel 15 g (Discontinued) 15 g Every 15 Minutes PRN 1/16/2019 1/16/2019    Sig - Route: Take 15 g by mouth Every 15 (Fifteen) Minutes As Needed for Low Blood Sugar (Blood sugar less than 70). - Oral    dextrose (GLUTOSE) oral gel 15 g (Discontinued) 15 g Every 15 Minutes " "PRN 1/16/2019 1/16/2019    Sig - Route: Take 15 g by mouth Every 15 (Fifteen) Minutes As Needed for Low Blood Sugar (Blood sugar less than 70). - Oral    Reason for Discontinue: Duplicate order    glucagon (human recombinant) (GLUCAGEN DIAGNOSTIC) injection 1 mg (Discontinued) 1 mg As Needed 1/16/2019 1/16/2019    Sig - Route: Inject 1 mg under the skin into the appropriate area as directed As Needed (Blood Glucose Less Than 70). - Subcutaneous    glucagon (human recombinant) (GLUCAGEN DIAGNOSTIC) injection 1 mg (Discontinued) 1 mg As Needed 1/16/2019 1/16/2019    Sig - Route: Inject 1 mg under the skin into the appropriate area as directed As Needed (Blood Glucose Less Than 70). - Subcutaneous    Reason for Discontinue: Duplicate order    insulin lispro (humaLOG) injection 0-14 Units (Discontinued) 0-14 Units 4 Times Daily With Meals & Nightly 1/16/2019 1/16/2019    Sig - Route: Inject 0-14 Units under the skin into the appropriate area as directed 4 (Four) Times a Day With Meals & at Bedtime. - Subcutaneous    insulin lispro (humaLOG) injection 0-24 Units (Discontinued) 0-24 Units 4 Times Daily With Meals & Nightly 1/16/2019 1/16/2019    Sig - Route: Inject 0-24 Units under the skin into the appropriate area as directed 4 (Four) Times a Day With Meals & at Bedtime. - Subcutaneous    Pharmacy to dose vancomycin (Discontinued)  Continuous PRN 1/16/2019 1/16/2019    Sig - Route: Continuous As Needed for Consult. - Does not apply    Reason for Discontinue: Reorder    Linked Group 2:  \"And\" Linked Group Details        vancomycin (VANCOCIN) 3,000 mg in Sodium chloride 0.9 % 500 mL IVPB (Discontinued) 20 mg/kg × 154 kg Once 1/16/2019 1/16/2019    Sig - Route: Infuse 3,000 mg into a venous catheter 1 (One) Time. - Intravenous    Reason for Discontinue: Dose adjustment    Linked Group 2:  \"And\" Linked Group Details                Lab Results (last 24 hours)     Procedure Component Value Units Date/Time    T4, Free " [[id number]]  (Normal) Collected:  01/16/19 0306    Specimen:  Blood Updated:  01/16/19 0800     Free T4 1.85 ng/dL     Hemoglobin A1c [[id number]] Collected:  01/15/19 2133    Specimen:  Blood Updated:  01/16/19 0434     Hemoglobin A1C 7.4 %     Narrative:       Less than 6.0           Non-Diabetic Range  6.0-7.0                 ADA Therapeutic Target  Greater than 7.0        Action Suggested    TSH [[id number]]  (Abnormal) Collected:  01/16/19 0306    Specimen:  Blood Updated:  01/16/19 0417     TSH 0.259 mIU/mL     CBC Auto Differential [[id number]]  (Abnormal) Collected:  01/16/19 0306    Specimen:  Blood Updated:  01/16/19 0401     WBC 32.37 10*3/mm3      RBC 4.51 10*6/mm3      Hemoglobin 12.4 g/dL      Hematocrit 38.1 %      MCV 84.5 fL      MCH 27.5 pg      MCHC 32.5 g/dL      RDW 14.4 %      RDW-SD 44.5 fl      MPV 9.8 fL      Platelets 376 10*3/mm3     Manual Differential [[id number]]  (Abnormal) Collected:  01/16/19 0306    Specimen:  Blood Updated:  01/16/19 0401     Neutrophil % 89.0 %      Lymphocyte % 5.0 %      Monocyte % 4.0 %      Metamyelocyte % 2.0 %      Neutrophils Absolute 28.81 10*3/mm3      Lymphocytes Absolute 1.62 10*3/mm3      Monocytes Absolute 1.29 10*3/mm3      RBC Morphology Normal     WBC Morphology Normal     Platelet Estimate Adequate     Giant Platelets Slight/1+    BNP [[id number]]  (Normal) Collected:  01/16/19 0306    Specimen:  Blood Updated:  01/16/19 0355     proBNP 691.0 pg/mL     POC Glucose Once [[id number]]  (Abnormal) Collected:  01/16/19 0343    Specimen:  Blood Updated:  01/16/19 0355     Glucose 370 mg/dL      Comment: : [id number] Jose PakseaMeter ID: [id number]       Comprehensive Metabolic Panel [[id number]]  (Abnormal) Collected:  01/16/19 0306    Specimen:  Blood Updated:  01/16/19 0349     Glucose 414 mg/dL      BUN 67 mg/dL      Creatinine 1.40 mg/dL      Sodium 134 mmol/L      Potassium 4.5 mmol/L      Chloride 91 mmol/L      CO2 30.0 mmol/L      Calcium 9.1  mg/dL      Total Protein 7.2 g/dL      Albumin 3.40 g/dL      ALT (SGPT) 27 U/L      AST (SGOT) 52 U/L      Alkaline Phosphatase 116 U/L      Total Bilirubin 0.7 mg/dL      eGFR Non African Amer 52 mL/min/1.73      Globulin 3.8 gm/dL      A/G Ratio 0.9 g/dL      BUN/Creatinine Ratio 47.9     Anion Gap 13.0 mmol/L     Magnesium [[id number]]  (Abnormal) Collected:  01/16/19 0306    Specimen:  Blood Updated:  01/16/19 0349     Magnesium 2.5 mg/dL     Phosphorus [[id number]]  (Normal) Collected:  01/16/19 0306    Specimen:  Blood Updated:  01/16/19 0349     Phosphorus 3.0 mg/dL     Lactic Acid, Plasma [[id number]]  (Normal) Collected:  01/16/19 0306    Specimen:  Blood Updated:  01/16/19 0344     Lactate 1.9 mmol/L     Blood Gas, Arterial [[id number]]  (Abnormal) Collected:  01/16/19 0335    Specimen:  Arterial Blood Updated:  01/16/19 0341     Site Left Radial     Devon's Test Positive     pH, Arterial 7.498 pH units      Comment: 83 Value above reference range        pCO2, Arterial 37.7 mm Hg      pO2, Arterial 83.7 mm Hg      HCO3, Arterial 29.2 mmol/L      Comment: 83 Value above reference range        Base Excess, Arterial 5.7 mmol/L      Comment: 83 Value above reference range        O2 Saturation, Arterial 96.8 %      Temperature 37.0 C      Barometric Pressure for Blood Gas 757 mmHg      Modality Ventilator     FIO2 100 %      Ventilator Mode AC     Set Tidal Volume 700     Set Mech Resp Rate 22.0     PEEP 5.0     Collected by [id number]     Comment: Meter: [id number]     :  [id number]       Legionella Antigen, Urine - Urine, Urine, Catheter [[id number]]  (Normal) Collected:  01/16/19 0046    Specimen:  Urine, Catheter Updated:  01/16/19 0209     LEGIONELLA ANTIGEN, URINE Negative    S. Pneumo Ag Urine or CSF - Urine, Urine, Catheter [[id number]]  (Normal) Collected:  01/16/19 0046    Specimen:  Urine, Catheter Updated:  01/16/19 0209     Strep Pneumo Ag Negative    Sodium, Urine, Random - Urine, Catheter  [[id number]]  (Abnormal) Collected:  01/16/19 0046    Specimen:  Urine, Catheter Updated:  01/16/19 0107     Sodium, Urine 21 mmol/L     Creatinine, Urine, Random - Urine, Catheter [[id number]] Collected:  01/16/19 0046    Specimen:  Urine, Catheter Updated:  01/16/19 0107     Creatinine, Urine 52.2 mg/dL     Urinalysis With Culture If Indicated - Urine, Catheter [[id number]]  (Abnormal) Collected:  01/16/19 0045    Specimen:  Urine, Catheter Updated:  01/16/19 0059     Color, UA Yellow     Appearance, UA Clear     pH, UA <=5.0     Specific Gravity, UA 1.025     Glucose, UA >=1000 mg/dL (3+)     Ketones, UA Negative     Bilirubin, UA Negative     Blood, UA Small (1+)     Protein, UA Trace     Leuk Esterase, UA Negative     Nitrite, UA Negative     Urobilinogen, UA 0.2 E.U./dL    Urinalysis, Microscopic Only - Urine, Catheter [[id number]]  (Abnormal) Collected:  01/16/19 0045    Specimen:  Urine, Catheter Updated:  01/16/19 0059     RBC, UA 6-12 /HPF      WBC, UA 0-2 /HPF      Bacteria, UA None Seen /HPF      Squamous Epithelial Cells, UA 0-2 /HPF      Hyaline Casts, UA 7-12 /LPF      Methodology Automated Microscopy    BNP [[id number]]  (Abnormal) Collected:  01/15/19 2133    Specimen:  Blood Updated:  01/16/19 0047     proBNP 1,090.0 pg/mL     Blood Culture - Blood, Arm, Left [[id number]] Collected:  01/16/19 0036    Specimen:  Blood from Arm, Left Updated:  01/16/19 0043    Blood Culture - Blood, Hand, Right [[id number]] Collected:  01/16/19 0036    Specimen:  Blood from Hand, Right Updated:  01/16/19 0042    Blood Gas, Arterial [[id number]]  (Abnormal) Collected:  01/16/19 0004    Specimen:  Arterial Blood Updated:  01/16/19 0009     Site Arterial Line     Devon's Test N/A     pH, Arterial 7.453 pH units      Comment: 83 Value above reference range        pCO2, Arterial 41.7 mm Hg      pO2, Arterial 74.7 mm Hg      Comment: 84 Value below reference range        HCO3, Arterial 29.2 mmol/L      Comment: 83 Value above  reference range        Base Excess, Arterial 4.7 mmol/L      Comment: 83 Value above reference range        O2 Saturation, Arterial 95.4 %      Temperature 37.0 C      Barometric Pressure for Blood Gas 756 mmHg      Modality Ventilator     FIO2 100 %      Ventilator Mode AC     Set Tidal Volume 700     Set Mech Resp Rate 22.0     PEEP 5.0     Collected by [id number]     Comment: Meter: [id number]     :  [id number]       Protime-INR [[id number]]  (Abnormal) Collected:  01/15/19 2133    Specimen:  Blood Updated:  01/15/19 2313     Protime 20.4 Seconds      INR 1.68    aPTT [[id number]]  (Abnormal) Collected:  01/15/19 2133    Specimen:  Blood Updated:  01/15/19 2313     PTT 39.2 seconds     CBC & Differential [[id number]] Collected:  01/15/19 2133    Specimen:  Blood Updated:  01/15/19 2238    Narrative:       The following orders were created for panel order CBC & Differential.  Procedure                               Abnormality         Status                     ---------                               -----------         ------                     Manual Differential[[id number]]          Abnormal            Final result               CBC Auto Differential[[id number]]        Abnormal            Final result                 Please view results for these tests on the individual orders.    CBC Auto Differential [[id number]]  (Abnormal) Collected:  01/15/19 2133    Specimen:  Blood Updated:  01/15/19 2238     WBC 25.17 10*3/mm3      RBC 4.44 10*6/mm3      Hemoglobin 12.2 g/dL      Hematocrit 38.7 %      MCV 87.2 fL      MCH 27.5 pg      MCHC 31.5 g/dL      RDW 14.5 %      RDW-SD 46.5 fl      MPV 9.3 fL      Platelets 310 10*3/mm3     Narrative:       The previously reported component NRBC is no longer being reported.    Manual Differential [[id number]]  (Abnormal) Collected:  01/15/19 2133    Specimen:  Blood Updated:  01/15/19 2238     Neutrophil % 80.8 %      Lymphocyte % 2.9 %      Monocyte % 1.9 %      Bands %   13.5 %      Atypical Lymphocyte % 1.0 %      Neutrophils Absolute 23.72 10*3/mm3      Lymphocytes Absolute 0.73 10*3/mm3      Monocytes Absolute 0.48 10*3/mm3      Basophilic Stippling Slight/1+     Poikilocytes Slight/1+     Polychromasia Slight/1+     Spherocytes Slight/1+     Dohle Bodies Present     Toxic Granulation Mod/2+     Vacuolated Neutrophils Large/3+     Platelet Morphology Normal    Procalcitonin [[id number]]  (Abnormal) Collected:  01/15/19 2133    Specimen:  Blood Updated:  01/15/19 2210     Procalcitonin 23.10 ng/mL     Narrative:       SIRS, sepsis, severe sepsis, and septic shock are categorized according to the criteria of the consensus conference of the American College of Chest Physicians/Society of Critical Care Medicine.    PCT < 0.5 ng/mL     Systemic infection (sepsis) is not likely.    PCT >0.5 and < 2.0 ng/mL Systemic infection (sepsis) is possible, but other conditions are known to elevate PCT as well.    PCT > 2.0 ng/mL     Systemic infection (sepsis) is likely, unless other causes are known.      PCT > 10.0 ng/mL    Important systemic inflammatory response, almost exclusively due to severe bacterial sepsis or septic shock.    PCT values of < 0.5 ng/mL do not exclude an infection, because localized infections (without systemic signs) may be associated with such low concentrations, or a systemic infection in its initial stages (<6 hours).  Increased PCT can occur without infection.  PCT concentrations between 0.5 and 2.0 ng/mL should be interpreted taking into account the patients history.  It is recommended to retest PCT within 6-24 hours if any concentrations < 2.0 ng/mL are obtained.    Troponin [[id number]]  (Normal) Collected:  01/15/19 2133    Specimen:  Blood Updated:  01/15/19 2202     Troponin I 0.015 ng/mL     Comprehensive Metabolic Panel [[id number]]  (Abnormal) Collected:  01/15/19 2133    Specimen:  Blood Updated:  01/15/19 2155     Glucose 495 mg/dL      BUN 70 mg/dL       Creatinine 1.87 mg/dL      Sodium 133 mmol/L      Potassium 5.1 mmol/L      Chloride 89 mmol/L      CO2 31.0 mmol/L      Calcium 8.8 mg/dL      Total Protein 7.0 g/dL      Albumin 3.40 g/dL      ALT (SGPT) 24 U/L      AST (SGOT) 35 U/L      Alkaline Phosphatase 115 U/L      Total Bilirubin 0.5 mg/dL      eGFR Non African Amer 37 mL/min/1.73      Globulin 3.6 gm/dL      A/G Ratio 0.9 g/dL      BUN/Creatinine Ratio 37.4     Anion Gap 13.0 mmol/L     Lactic Acid, Plasma [[id number]]  (Normal) Collected:  01/15/19 2133    Specimen:  Blood Updated:  01/15/19 2151     Lactate 2.0 mmol/L     Phosphorus [[id number]]  (Abnormal) Collected:  01/15/19 2133    Specimen:  Blood Updated:  01/15/19 2150     Phosphorus 4.9 mg/dL     Magnesium [[id number]]  (Abnormal) Collected:  01/15/19 2133    Specimen:  Blood Updated:  01/15/19 2150     Magnesium 2.3 mg/dL     Blood Gas, Arterial [[id number]]  (Abnormal) Collected:  01/15/19 2010    Specimen:  Arterial Blood Updated:  01/15/19 2013     Site Right Radial     Devon's Test Positive     pH, Arterial 7.327 pH units      Comment: 84 Value below reference range        pCO2, Arterial 57.3 mm Hg      Comment: 83 Value above reference range        pO2, Arterial 68.1 mm Hg      Comment: 84 Value below reference range        HCO3, Arterial 30.0 mmol/L      Comment: 83 Value above reference range        Base Excess, Arterial 2.7 mmol/L      Comment: 83 Value above reference range        O2 Saturation, Arterial 92.5 %      Comment: 84 Value below reference range        Temperature 37.0 C      Barometric Pressure for Blood Gas 757 mmHg      Modality Ventilator     FIO2 100 %      Ventilator Mode AC     Set Tidal Volume 650     Set Mech Resp Rate 20.0     PEEP 5.0     Collected by [id number]     Comment: Meter: [id number]     :  [id number]       Respiratory Culture - Sputum, ET Suction [[id number]] Collected:  01/15/19 2009    Specimen:  Sputum from ET Suction Updated:  01/15/19 2013         Imaging Results (last 24 hours)     Procedure Component Value Units Date/Time    XR Chest 1 View [[id number]] Updated:  01/16/19 0805    CT Chest Without Contrast [[id number]] Collected:  01/16/19 0725     Updated:  01/16/19 0735    Narrative:       EXAMINATION:   CT CHEST WO CONTRAST-  1/16/2019 7:25 AM CST     CT CHEST WO CONTRAST- 1/15/2019 10:47 PM CST     HISTORY: Pneumonia complicated / unresolved     COMPARISON: None     DOSE LENGTH PRODUCT: 1112 mGy cm. Automated exposure control was also  utilized to decrease patient radiation dose.     TECHNIQUE: Serial helical tomographic images of the chest were acquired.  Multiplanar reformatted images were provided for review.     FINDINGS:  The imaged portion of the neck and thyroid gland is unremarkable.      Infiltrates present in the right lower lobe. Opacification of the left  hemithorax. There is consolidation of the left lung and a moderate to  large left pleural complex. Only a small segment of the left upper lobe  is aerated.. The left pleural complex is large pleural complex. Is may  be causing compression atelectasis of the consolidated left lung.  Tracheal tube is present. Nasogastric tube is satisfactorily position..     The heart, great vessels, and pulmonary vessels are normal in appearance  within limits of a noncontrast study. There is no pericardial effusion.  No enlarged axillary or mediastinal lymph nodes are present.      No acute findings are seen in the bones or surrounding soft tissues.     Calculi are noted in the gallbladder.       Impression:       1. Large left pleural complex. There is consolidation of the left lung.  Only a small segment of the left upper lobe is aerated.  2. Small infiltrate right lower lobe        This report was finalized on 01/16/2019 07:32 by Dr. Jerrod Nunez MD.    XR Chest 1 View [[id number]] Collected:  01/16/19 0700     Updated:  01/16/19 0705    Narrative:       History:  58-year-old to confirm endotracheal  tube placement.     Reference:  CT chest 1 day prior.     Findings:  Frontal chest radiograph performed.     There remains consolidation in the right lower lobe. Large left pleural  effusion compressing the majority the left lung. Alternatively this  could be extensive left lung pneumonia with parapneumonic fluid.  Atherosclerotic calcifications. No pneumothorax. Enteric tube extends  below diaphragm, tip not imaged. Endotracheal tube is in good position  at the level of the sternoclavicular joints.          Impression:       Satisfactory endotracheal tube.  This report was finalized on 01/16/2019 07:02 by Dr Jean-Pierre Solano, .    XR Chest 1 View [[id number]] Collected:  01/15/19 2003     Updated:  01/15/19 2008    Narrative:       EXAMINATION: Chest 1 view 1/15/2019     HISTORY: Respiratory failure. Mechanical ventilation     FINDINGS: Endotracheal tube projects at the T3 level. There is volume  loss on the left with mediastinal shift to the left. There is  consolidation within the medial aspect of the left upper lobe as well as  consolidation of the left lower lobe with silhouetting of the left  hemidiaphragm. It is difficult to ascertain whether this represents  atelectasis related to mucoid impaction versus a neoplastic process. I  would suggest follow-up with an enhanced CT of the chest for further  assessment. There is also a nodular opacity within the right midlung  zone which could be infectious in nature but which could also be  assessed at the time of CT imaging. The right lung is otherwise clear.       Impression:       1.. Consolidation and volume loss within both the left upper and left  lower lobe with silhouetting of the left diaphragm, heart and left  mediastinum. It is difficult to ascertain whether this represents mucoid  impaction with partial collapse of segments of the left lung versus a  neoplastic process. CT imaging with IV contrast is recommended.  2. Nodular opacity within the right midlung zone  either related to  ongoing infiltrate versus a neoplastic process.  3. Endotracheal tube well-positioned.  This report was finalized on 01/15/2019 20:05 by Dr. Rudy Ahmadi MD.        ECG/EMG Results (last 24 hours)     Procedure Component Value Units Date/Time    ECG 12 Lead [[id number]] Collected:  01/16/19 0040     Updated:  01/16/19 0042    Narrative:       Test Reason : sepsis  Blood Pressure : **/** mmHG  Vent. Rate : 098 BPM     Atrial Rate : 098 BPM     P-R Int : 134 ms          QRS Dur : 098 ms      QT Int : 346 ms       P-R-T Axes : 072 087 047 degrees     QTc Int : 441 ms    Normal sinus rhythm  Normal ECG  No previous ECGs available    Referred By:  GARRISON           Confirmed By:           Orders (last 24 hrs)     Start     Ordered    01/18/19 0600  RN to Place Order for PT Eval & Treat if Patient Intubated Over 48 Hours  Continuous      01/16/19 0016    01/17/19 0600  XR Chest 1 View  Daily      01/16/19 0016    01/17/19 0600  CBC & Differential  Morning Draw      01/16/19 0828    01/17/19 0600  Comprehensive Metabolic Panel  Morning Draw      01/16/19 0828    01/16/19 1400  vancomycin 1250 mg/250 mL 0.9% NS IVPB ([facility])  Every 12 Hours      01/16/19 0033    01/16/19 1130  ipratropium-albuterol (DUO-NEB) nebulizer solution 3 mL  Every 4 Hours - RT      01/16/19 0827    01/16/19 0900  enoxaparin (LOVENOX) syringe 40 mg  Every 24 Hours      01/16/19 0016    01/16/19 0900  famotidine (PEPCID) injection 20 mg  2 Times Daily      01/16/19 0016    01/16/19 0900  cefepime (MAXIPIME) 2 g/100 mL 0.9% NS (mbp)  Every 8 Hours      01/16/19 0033    01/16/19 0834  Nutrition Consult  Once     Provider:  (Not yet assigned)    01/16/19 0833    01/16/19 0830  Morphine injection 5 mg  Once      01/16/19 0730    01/16/19 0827  MRSA Screen Culture - Swab, Nares  Once      01/16/19 0826    01/16/19 0824  Ventilator - AC/VC; (18); 60; 90%; Other (see comments); 7; 600  Continuous      01/16/19 0824 01/16/19 0810  Body  Fluid Culture - Body Fluid, Pleural Cavity  STAT      01/16/19 0810    01/16/19 0810  Inpatient Pulmonology Consult  Once     Specialty:  Pulmonary Disease  Provider:  Damion Wright MD    01/16/19 0810    01/16/19 0810  Anaerobic Culture - Pleural Fluid, Pleural Cavity  STAT      01/16/19 0810    01/16/19 0810  Fungus Culture - Body Fluid, Pleural Cavity  STAT      01/16/19 0810    01/16/19 0809  Body Fluid Cell Count With Differential - Body Fluid, Pleural Cavity  STAT      01/16/19 0810    01/16/19 0809  pH, Body Fluid - Body Fluid, Pleural Cavity  STAT      01/16/19 0810    01/16/19 0809  Albumin, Fluid - Pleural Fluid, Pleural Cavity  STAT      01/16/19 0810    01/16/19 0809  Protein, Body Fluid - Pleural Fluid, Pleural Cavity  STAT      01/16/19 0810    01/16/19 0809  Lactate Dehydrogenase, Body Fluid - Body Fluid, Pleural Cavity  STAT      01/16/19 0810    01/16/19 0809  Glucose, Body Fluid - Pleural Fluid, Pleural Cavity  STAT      01/16/19 0810    01/16/19 0809  Non-gynecologic Cytology  STAT      01/16/19 0810    01/16/19 0809  Body fluid cell count - Body Fluid, Pleural Cavity  PROCEDURE ONCE      01/16/19 0810    01/16/19 0800  insulin lispro (humaLOG) injection 0-24 Units  4 Times Daily With Meals & Nightly,   Status:  Discontinued      01/16/19 0016    01/16/19 0800  insulin lispro (humaLOG) injection 0-14 Units  4 Times Daily With Meals & Nightly,   Status:  Discontinued      01/16/19 0357    01/16/19 0800  Morphine sulfate (PF) injection 2 mg  Once      01/16/19 0712    01/16/19 0755  Placed Pleur-evac to 20 cm of suction.  Record output every shift.  Send sterile culture for change CNS, AFB and fungus and another specimen for cytology  Misc Nursing Order (Specify)  Once     Comments:  Placed Pleur-evac to 20 cm of suction.  Record output every shift.  Send sterile culture for change CNS, AFB and fungus and another specimen for cytology    01/16/19 0755    01/16/19 0745  insulin regular  (HumuLIN R,NovoLIN R) 100 Units in Sodium chloride 0.9 % 100 mL (1 Units/mL) infusion  Titrated      01/16/19 0655    01/16/19 0741  XR Chest 1 View  1 Time Imaging      01/16/19 0741    01/16/19 0730  Morphine (PF) 10 MG/ML injection  - ADS Override Pull     Comments:  Created by cabinet override    01/16/19 0730    01/16/19 0715  lidocaine (XYLOCAINE) 1 % injection  - ADS Override Pull     Comments:  Created by cabinet override    01/16/19 0715    01/16/19 0714  lidocaine (XYLOCAINE) 1 % injection  - ADS Override Pull     Comments:  Created by cabinet override    01/16/19 0714    01/16/19 0711  Morphine sulfate (PF) 2 MG/ML injection  - ADS Override Pull     Comments:  Created by cabinet override    01/16/19 0711    01/16/19 0702  Inpatient Pulmonology Consult  IN AM,   Status:  Canceled     Specialty:  Pulmonary Disease  Provider:  Damion Wright MD    01/16/19 0016    01/16/19 0700  Inpatient Cardiothoracic Surgery Consult  IN AM     Specialty:  Cardiothoracic Surgery  Provider:  Berlin Oglesby MD    01/16/19 0357    01/16/19 0700  POC Glucose 4x Daily AC & at Bedtime  4 Times Daily Before Meals & at Bedtime      01/16/19 0357    01/16/19 0700  POC Glucose Q1H  Every Hour      01/16/19 0655    01/16/19 0656  Initiate Insulin Drip if Patient Has Blood Glucose 180 or Greater on 2 Consecutive Checks, or if Blood Glucose 250 or Greater at Any Time  Continuous      01/16/19 0655    01/16/19 0656  Ensure All Previous Diabetes Medication Orders Are Discontinued  Once      01/16/19 0655    01/16/19 0656  Check Blood Glucose On Arrival & Every Hour; If Glucose 100-180 on Same Insulin Rate for 4 Hours, Check Blood Glucose Every 2 Hours  Continuous      01/16/19 0655    01/16/19 0656  Goal - Lower Blood Glucose By No More Than 100/hr - Optimal Glucose 111-180  Continuous      01/16/19 0655    01/16/19 0656  Insulin Infusion Adjustment Criteria  Continuous     Comments:  A) Glucose Drops  mg/dL Since Last  Check - Do NOT Increase Insulin Infusion  B) Glucose Drops Greater Than 100 mg/dL Since Last Check - STOP INSULIN Infusion & Recheck Glucose Every Hour       - When Blood Glucose Greater Than 180, Restart Insulin Drip at 50% of Previous Insulin Rate (Round to the Nearest Whole Unit)    01/16/19 0655    01/16/19 0656  Notify Provider if Glucose Remains Greater Than 250 After 4 Hours of Insulin Infusion  Until Discontinued      01/16/19 0655    01/16/19 0656  Notify Provider for Changes in Nutrition or Steroids  Until Discontinued      01/16/19 0655    01/16/19 0656  Do NOT Discontinue Insulin Drip Without Transition Insulin or Subcutaneous Insulin Orders  Continuous      01/16/19 0655    01/16/19 0655  dextrose (D50W) 25 g/ 50mL Intravenous Solution 25-50 mL  Every 30 Minutes PRN      01/16/19 0655    01/16/19 0654  T4, Free  Once      01/16/19 0653    01/16/19 0600  Incentive Spirometry  Every 4 Hours While Awake      01/16/19 0016 01/16/19 0600  Blood Gas, Arterial  Daily     Comments:  While On Ventilator      01/16/19 0016    01/16/19 0600  Blood Gas, Arterial  Morning Draw,   Status:  Canceled      01/16/19 0016 01/16/19 0600  BNP  Morning Draw      01/16/19 0016    01/16/19 0600  CBC Auto Differential  Morning Draw      01/16/19 0016    01/16/19 0600  Comprehensive Metabolic Panel  Morning Draw      01/16/19 0016    01/16/19 0600  Lactic Acid, Plasma  Morning Draw      01/16/19 0016    01/16/19 0600  Magnesium  Morning Draw      01/16/19 0016    01/16/19 0600  Phosphorus  Morning Draw      01/16/19 0016    01/16/19 0600  TSH  Morning Draw      01/16/19 0016    01/16/19 0600  cefepime (MAXIPIME) 2 g/100 mL 0.9% NS (mbp)  Every 8 Hours Scheduled,   Status:  Discontinued      01/16/19 0016    01/16/19 0600  guaiFENesin (ROBITUSSIN) 100 MG/5ML oral solution 400 mg  Every 8 Hours Scheduled      01/16/19 0016    01/16/19 0400  POC Glucose Q4H  Every 4 Hours      01/16/19 0016    01/16/19 0400  Oral Care & Teeth  Brushing  Every 4 Hours     Comments:  [place] Teeth at Least 2x/day    01/16/19 0016    01/16/19 0356  POC Glucose Once  Once      01/16/19 0343    01/16/19 0356  Do NOT Hold Basal Insulin When Patient is NPO, Hold Bolus Dose if NPO  Continuous      01/16/19 0357 01/16/19 0356  Follow [facility] Hypoglycemia Standing Orders For Blood Glucose Less Than 70 mg/dL  Until Discontinued      01/16/19 0357 01/16/19 0356  Hypoglycemia Treatment - Alert Patient That is Not NPO and Can Safely Swallow  Until Discontinued     Comments:  Administer 4 oz Fruit Juice OR 4 oz Regular Soda OR 8 oz Milk OR 15-30 grams (1 tube) of Glucose Gel.  Recheck Blood Glucose 15 Minutes After Ingestion, Repeat Treatment & Continue to Recheck Blood Sugar Every 15 Minutes Until Blood Glucose is 70 mg/dL or Higher.  Once Blood Glucose is 70 mg/dL or Higher and if It Will Be more than 60 Minutes Until the Next Meal, Provide Appropriate Snack (Including Carbohydrate Food) Based on Meal Plan Order. Give Meal Tray As Soon As Possible.    01/16/19 0357 01/16/19 0356  Hypoglycemia Treatment - Patient Has IV Access - Unresponsive, NPO or Unable To Safely Swallow  Until Discontinued     Comments:  Administer 25g (50ml) D50W IV Push.  Recheck Blood Glucose 15 Minutes After Administration, if Blood Glucose Remains Less Than 70 mg/dl, Repeat Treatment   Recheck Blood Glucose 15 Minutes After 2nd Administration, if Blood Glucose Remains Less Than 70 mg/dL After 2nd Dose of D50W, Contact Provider for Further Treatment Orders & Consider Adding IVF With D5W for Maintenance    01/16/19 0357 01/16/19 0356  Hypoglycemia Treatment - Patient Without IV Access - Unresponsive, NPO or Unable To Safely Swallow  Until Discontinued     Comments:  Administer 1mg Glucagon SQ & Establish IV Access.  Turn Patient on Side - Nausea / Vomiting May Occur.  Recheck Blood Glucose 15 Minutes After Administration.  If Blood Glucose Remains Less Than 70, Administer 25g D50W IV Push  (50ml).  Recheck Blood Glucose 15 Minutes After Administration of D50W, if Blood Glucose Remains Less Than 70 mg/dl, Contact Provider for Further Treatment Orders & Consider Adding IVF With D5 for Maintenance    01/16/19 0357 01/16/19 0355  dextrose (GLUTOSE) oral gel 15 g  Every 15 Minutes PRN,   Status:  Discontinued      01/16/19 0357 01/16/19 0355  dextrose (D50W) 25 g/ 50mL Intravenous Solution 25 g  Every 15 Minutes PRN,   Status:  Discontinued      01/16/19 0357 01/16/19 0355  glucagon (human recombinant) (GLUCAGEN DIAGNOSTIC) injection 1 mg  As Needed,   Status:  Discontinued      01/16/19 0357 01/16/19 0342  Blood Gas, Arterial  Once      01/16/19 0335    01/16/19 0328  Manual Differential  Once      01/16/19 0327 01/16/19 0200  vancomycin (VANCOCIN) 2,000 mg in Sodium chloride 0.9 % 500 mL IVPB  Once      01/16/19 0033    01/16/19 0115  sodium chloride 0.9 % flush 3 mL  Every 12 Hours Scheduled      01/16/19 0016 01/16/19 0115  chlorhexidine (PERIDEX) 0.12 % solution 15 mL  Every 12 Hours Scheduled,   Status:  Discontinued      01/16/19 0016 01/16/19 0115  Sodium chloride 0.9 % infusion  Continuous      01/16/19 0016 01/16/19 0115  vancomycin (VANCOCIN) 3,000 mg in Sodium chloride 0.9 % 500 mL IVPB  Once,   Status:  Discontinued      01/16/19 0016 01/16/19 0100  Vital Signs Every Hour and Per Hospital Policy Based on Patient Condition  Every Hour      01/16/19 0016 01/16/19 0100  Intake and Output  Every Hour      01/16/19 0016 01/16/19 0100  levoFLOXacin (LEVAQUIN) 750 mg/150 mL D5W (premix) (LEVAQUIN) 750 mg  Every 24 Hours      01/16/19 0016 01/16/19 0100  cefepime (MAXIPIME) 2 g/100 mL 0.9% NS (mbp)  Once      01/16/19 0033    01/16/19 0057  Urinalysis, Microscopic Only - Urine, Clean Catch  Once      01/16/19 0056    01/16/19 0033  Ventilator - AC/VC; (22); 100; 90%; 5; (700)  Continuous,   Status:  Canceled      01/16/19 0033 01/16/19 0017  Daily Weights  Daily       01/16/19 0016    01/16/19 0017  Spontaneous Awakening Trial  Daily      01/16/19 0016    01/16/19 0013  ondansetron (ZOFRAN) tablet 4 mg  Every 6 Hours PRN      01/16/19 0016    01/16/19 0013  ondansetron ODT (ZOFRAN-ODT) disintegrating tablet 4 mg  Every 6 Hours PRN      01/16/19 0016    01/16/19 0013  ondansetron (ZOFRAN) injection 4 mg  Every 6 Hours PRN      01/16/19 0016    01/16/19 0012  acetaminophen (TYLENOL) tablet 650 mg  Every 4 Hours PRN      01/16/19 0016    01/16/19 0012  acetaminophen (TYLENOL) suppository 650 mg  Every 4 Hours PRN      01/16/19 0016    01/16/19 0012  Pharmacy to dose vancomycin  Continuous PRN,   Status:  Discontinued      01/16/19 0016    01/16/19 0011  Blood Culture - Blood,  Once      01/16/19 0016 01/16/19 0011  Blood Culture - Blood,  Once     Comments:  Minimum 30 Minutes After 1st Blood Culture or From Different Site      01/16/19 0016    01/16/19 0011  Respiratory Culture - Sputum, ET Suction  Once,   Status:  Canceled      01/16/19 0016    01/16/19 0010  albuterol (PROVENTIL) nebulizer solution 0.083% 2.5 mg/3mL  Once As Needed      01/16/19 0016    01/16/19 0010  Blood Gas, Arterial  Once      01/16/19 0004    01/16/19 0010  BNP  STAT      01/16/19 0016    01/16/19 0008  Intubate  Once      01/16/19 0016    01/16/19 0008  Ventilator - AC/VC; 100; 90%  Continuous,   Status:  Canceled      01/16/19 0016    01/16/19 0008  Elevate HOB  Continuous      01/16/19 0016    01/16/19 0008  NPO Diet  Diet Effective Now      01/16/19 0016    01/16/19 0008  Inpatient Consult to Nutrition Services  Once     Provider:  (Not yet assigned)    01/16/19 0016    01/16/19 0008  Blood Gas, Arterial  Once     Comments:  One Hour After Intubation      01/16/19 0016    01/16/19 0008  RN May Place Order For ABG As Needed for Respiratory Distress  Continuous      01/16/19 0016 01/16/19 0008  XR Chest 1 View  1 Time Imaging      01/16/19 0016 01/16/19 0008  Nasogastric Tube Insertion  Once      Comments:  May do OG    01/16/19 0016    01/16/19 0007  albuterol (PROVENTIL) nebulizer solution 0.083% 2.5 mg/3mL  Every 4 Hours PRN,   Status:  Discontinued      01/16/19 0016    01/16/19 0007  ipratropium (ATROVENT) nebulizer solution 0.5 mg  Every 4 Hours PRN      01/16/19 0016    01/16/19 0007  Legionella Antigen, Urine - Urine, Urine, Clean Catch  Once      01/16/19 0016    01/16/19 0007  S. Pneumo Ag Urine or CSF - Urine, Urine, Clean Catch  Once      01/16/19 0016    01/16/19 0007  Urinalysis With Culture If Indicated - Urine, Catheter  Once      01/16/19 0016    01/16/19 0007  Sodium, Urine, Random - Urine, Clean Catch  Once      01/16/19 0016    01/16/19 0007  Creatinine, Urine, Random - Urine, Clean Catch  Once      01/16/19 0016    01/16/19 0006  Hemoglobin A1c  Once      01/16/19 0016    01/16/19 0006  Mycoplasma Pneumoniae PCR - Aspirate, Bronchus  Once      01/16/19 0016    01/16/19 0004  Patient At Risk for Pseudomonal Infection  Once      01/16/19 0016    01/16/19 0003  Code Status and Medical Interventions:  Continuous      01/16/19 0016    01/16/19 0003  Cardiac Monitoring  Continuous      01/16/19 0016    01/16/19 0003  Continuous Pulse Oximetry  Continuous      01/16/19 0016    01/16/19 0003  Strict Bed Rest  Until Discontinued      01/16/19 0016    01/16/19 0003  Use Mobility Guidelines for Advancement of Activity  Continuous      01/16/19 0016    01/16/19 0003  Pulse Oximetry on Admit  Once      01/16/19 0016    01/16/19 0003  Pneumonia Education  Once     Comments:  g    01/16/19 0016    01/16/19 0003  Notify Provider if Patient Requires Greater Than 4LPM of Oxygen  Until Discontinued      01/16/19 0016    01/16/19 0003  Do NOT Hold Basal Insulin When Patient is NPO, Hold Bolus Dose if NPO  Continuous      01/16/19 0016    01/16/19 0003  Follow [facility] Hypoglycemia Standing Orders For Blood Glucose Less Than 70 mg/dL  Until Discontinued      01/16/19 0016    01/16/19 0003  Hypoglycemia  Treatment - Alert Patient That is Not NPO and Can Safely Swallow  Until Discontinued     Comments:  Administer 4 oz Fruit Juice OR 4 oz Regular Soda OR 8 oz Milk OR 15-30 grams (1 tube) of Glucose Gel.  Recheck Blood Glucose 15 Minutes After Ingestion, Repeat Treatment & Continue to Recheck Blood Sugar Every 15 Minutes Until Blood Glucose is 70 mg/dL or Higher.  Once Blood Glucose is 70 mg/dL or Higher and if It Will Be more than 60 Minutes Until the Next Meal, Provide Appropriate Snack (Including Carbohydrate Food) Based on Meal Plan Order. Give Meal Tray As Soon As Possible.    01/16/19 0016 01/16/19 0003  Hypoglycemia Treatment - Patient Has IV Access - Unresponsive, NPO or Unable To Safely Swallow  Until Discontinued     Comments:  Administer 25g (50ml) D50W IV Push.  Recheck Blood Glucose 15 Minutes After Administration, if Blood Glucose Remains Less Than 70 mg/dl, Repeat Treatment   Recheck Blood Glucose 15 Minutes After 2nd Administration, if Blood Glucose Remains Less Than 70 mg/dL After 2nd Dose of D50W, Contact Provider for Further Treatment Orders & Consider Adding IVF With D5W for Maintenance    01/16/19 0016 01/16/19 0003  Hypoglycemia Treatment - Patient Without IV Access - Unresponsive, NPO or Unable To Safely Swallow  Until Discontinued     Comments:  Administer 1mg Glucagon SQ & Establish IV Access.  Turn Patient on Side - Nausea / Vomiting May Occur.  Recheck Blood Glucose 15 Minutes After Administration.  If Blood Glucose Remains Less Than 70, Administer 25g D50W IV Push (50ml).  Recheck Blood Glucose 15 Minutes After Administration of D50W, if Blood Glucose Remains Less Than 70 mg/dl, Contact Provider for Further Treatment Orders & Consider Adding IVF With D5 for Maintenance    01/16/19 0016 01/16/19 0003  Notify Provider of event. Communicate needs and document in EMR.  Once      01/16/19 0016 01/16/19 0003  Document event and patients response to treatment in EMR, any medications  given to be documented on MAR.  Once      01/16/19 0016    01/16/19 0003  Height & Weight  Once      01/16/19 0016    01/16/19 0003  Insert Peripheral IV  Once      01/16/19 0016    01/16/19 0003  Saline Lock & Maintain IV Access  Continuous      01/16/19 0016    01/16/19 0003  Pneumonia Finding Seen on CXR  Once      01/16/19 0016    01/16/19 0003  Inpatient Admission  Once      01/16/19 0016    01/16/19 0002  dextrose (GLUTOSE) oral gel 15 g  Every 15 Minutes PRN,   Status:  Discontinued      01/16/19 0016 01/16/19 0002  dextrose (D50W) 25 g/ 50mL Intravenous Solution 25 g  Every 15 Minutes PRN,   Status:  Discontinued      01/16/19 0016 01/16/19 0002  glucagon (human recombinant) (GLUCAGEN DIAGNOSTIC) injection 1 mg  As Needed,   Status:  Discontinued      01/16/19 0016 01/16/19 0002  sodium chloride 0.9 % flush 3-10 mL  As Needed      01/16/19 0016    01/15/19 2327  Blood Gas, Arterial  As Needed,   Status:  Canceled      01/15/19 2328    01/15/19 2244  CT Chest Without Contrast  1 Time Imaging      01/15/19 2244    01/15/19 2232  NPO Diet  Diet Effective Now,   Status:  Canceled      01/15/19 2231    01/15/19 2232  Inpatient Case Management  Consult  Once     Provider:  (Not yet assigned)    01/15/19 2231    01/15/19 2142  Manual Differential  Once      01/15/19 2141    01/15/19 2130  norepinephrine (LEVOPHED) 8,000 mcg in Sodium chloride 0.9 % 250 mL (32 mcg/mL) infusion  Titrated      01/15/19 2044    01/15/19 2111  Protime-INR  STAT      01/15/19 2110    01/15/19 2111  aPTT  STAT      01/15/19 2110    01/15/19 2046  Lactic Acid, Plasma  Once      01/15/19 2110    01/15/19 2046  Procalcitonin  STAT      01/15/19 2110    01/15/19 2045  CBC & Differential  STAT      01/15/19 2110    01/15/19 2045  Comprehensive Metabolic Panel  STAT      01/15/19 2110    01/15/19 2045  Magnesium  STAT      01/15/19 2110    01/15/19 2045  Phosphorus  Once      01/15/19 2110    01/15/19 2045  Troponin   STAT      01/15/19 2110    01/15/19 2045  ECG 12 Lead  STAT      01/15/19 2110    01/15/19 2045  CBC Auto Differential  PROCEDURE ONCE      01/15/19 2111    01/15/19 2034  lidocaine (XYLOCAINE) 1 % injection  - ADS Override Pull     Comments:  Created by cabinet override    01/15/19 2034    01/15/19 2030  propofol (DIPRIVAN) infusion 10 mg/mL 100 mL  Titrated      01/15/19 1944    01/15/19 2014  Blood Gas, Arterial  Once      01/15/19 2010    01/15/19 2008  Respiratory Culture - Sputum, ET Suction  Once      01/15/19 2007    01/15/19 2000  Blood Gas, Arterial  Once      01/15/19 1937    01/15/19 1939  XR Chest 1 View  1 Time Imaging      01/15/19 1938    01/15/19 1938  XR Chest 1 View  1 Time Imaging,   Status:  Canceled      01/15/19 1937    Unscheduled  Blood Gas, Arterial  As Needed     Comments:  Respiratory Distress      01/16/19 0016    Unscheduled  Subglottic Suctioning Must Be Done Every 6 Hours  As Needed      01/16/19 0016    Unscheduled  Sputum Induction if Necessary  As Needed      01/16/19 0016    --  lisinopril (PRINIVIL,ZESTRIL) 40 MG tablet  Daily      01/15/19 2300    --  amoxicillin-clavulanate (AUGMENTIN) 875-125 MG per tablet  2 Times Daily,   Status:  Discontinued      01/15/19 2300    --  hydrochlorothiazide (HYDRODIURIL) 25 MG tablet  Daily      01/15/19 2300    --  predniSONE (DELTASONE) 10 MG tablet  2 Times Daily      01/15/19 2300    --  cetirizine (zyrTEC) 10 MG tablet  Daily      01/15/19 2300    --  pravastatin (PRAVACHOL) 10 MG tablet  Daily,   Status:  Discontinued      01/15/19 2337    --  Empagliflozin (JARDIANCE) 10 MG tablet  Daily      01/16/19 0402    --  rosuvastatin (CRESTOR) 5 MG tablet  Nightly      01/16/19 0402    --  fluticasone (FLONASE) 50 MCG/ACT nasal spray  2 Times Daily      01/16/19 0407    --  promethazine-dextromethorphan (PROMETHAZINE-DM) 6.25-15 MG/5ML syrup  Every 6 Hours PRN      01/16/19 0407          Ventilator/Non-Invasive Ventilation Settings (From  admission, onward)    Start     Ordered    01/16/19 0824  Ventilator - AC/VC; (18); 60; 90%; Other (see comments); 7; 600  Continuous     Question Answer Comment   Vent Mode AC/VC    Breath rate  18   FiO2 60    FiO2 titrate for Sp02% =/> 90%    PEEP Other (see comments)    PEEP: 7    Tidal Volume 600        01/16/19 0824    01/16/19 0033  Ventilator - AC/VC; (22); 100; 90%; 5; (700)  Continuous,   Status:  Canceled     Question Answer Comment   Vent Mode AC/VC    Breath rate  22   FiO2 100    FiO2 titrate for Sp02% =/> 90%    PEEP 5    Tidal Volume  700       01/16/19 0033    01/16/19 0008  Ventilator - AC/VC; 100; 90%  Continuous,   Status:  Canceled     Question Answer Comment   Vent Mode AC/VC    FiO2 100    FiO2 titrate for Sp02% =/> 90%        01/16/19 0016            Physician Progress Notes (last 24 hours) (Notes from 1/15/2019  9:06 AM through 1/16/2019  9:06 AM)     No notes of this type exist for this encounter.           Consult Notes (last 24 hours) (Notes from 1/15/2019  9:06 AM through 1/16/2019  9:06 AM)      Thalia Mix APRN at 1/16/2019  8:24 AM              PULMONARY AND CRITICAL CARE CONSULT - [facility]    Alan Carlin   MR# [id number]  Acct# [id number]  1/16/2019   8:25 AM    Referring Provider: Aniceto Ott MD    Chief Complaint: Mechanically ventilated    HPI: We are consulted by Aniceto Ott MD to see this 58 y.o. male born on 1960.  Patient is presently intubated and sedated and unable to provide any information.  Per my review the records and talking with the staff the patient was started from an outlying facility for issues with a complicated pneumonia and pleural effusion.  Per the records he indicated he had been sick for several weeks with an upper respiratory illness.  He had been on several rounds of antibiotics and steroids without improvement.  He is employed as a  and had gone to work but was unable to ambulate across the room  without becoming short of breath therefore he was brought in to the outlying facility.  He is since been intubated.  He has received fluid resuscitation for sepsis.  He is on IV antibiotics as well as pressors.  Blood pressure prior to chest tube placement was in the 70s systolic.  Blood pressure is now in the 130s systolic post evacuation of the large fluid collection.  There is presently no family at the bedside.  Rate, tidal volume have been reduced, PEEP has been increased and FiO2 is also been reduced.  His nebs and then changed to scheduled as opposed to when necessary.  Plans later in the day for placing an arterial line.     Past Medical History   has a past medical history of Dyslipidemia, Elevated blood pressure, Essential hypertension, and Type II diabetes mellitus, uncontrolled (CMS/[facility]).   has no past surgical history on file.  No Known Allergies  Medications    cefepime 2 g Intravenous Q8H   enoxaparin 40 mg Subcutaneous Q24H   famotidine 20 mg Intravenous BID   guaiFENesin 400 mg Oral Q8H   levoFLOXacin 750 mg Intravenous Q24H   lidocaine      lidocaine      Morphine (PF)      Morphine 5 mg Intravenous Once   Morphine sulfate (PF)      Morphine 2 mg Intravenous Once   sodium chloride 3 mL Intravenous Q12H   vancomycin 1,250 mg Intravenous Q12H       insulin regular infusion 1 unit/mL 1-20 Units/hr    norepinephrine 0.02-0.3 mcg/kg/min Last Rate: 0.04 mcg/kg/min (01/16/19 0519)   propofol 5-50 mcg/kg/min Last Rate: 40 mcg/kg/min (01/16/19 0610)   Sodium chloride 50 mL/hr Last Rate: 50 mL/hr (01/16/19 0041)     Social History   reports that he has quit smoking. he has never used smokeless tobacco. He reports that he does not drink alcohol.  Family History  family history includes Diabetes in his other; Thyroid disease in his other.  Review of Systems:    Cannot obtain due to mechanical ventilation.  The patient notably is critically ill and connected to a ventilator.  As such patient cannot communicate  and provide any history whatsoever, including any history of present illness or interval history since arrival or review of systems. The interested reviewer may note this fact, as an attempt has been made at collecting and documenting these portions of the patient history, but this information is unobtainable despite attempted review and therefore cannot be documented at this time.     Physical Exam:  Temp:  [97.8 °F (36.6 °C)-98 °F (36.7 °C)] 97.8 °F (36.6 °C)  Heart Rate:  [] 103  Resp:  [16-22] 18  BP: ()/(60-94) 118/77  FiO2 (%):  [70 %-100 %] 70 %    Intake/Output Summary (Last 24 hours) at 1/16/2019 0825  Last data filed at 1/16/2019 0400  Gross per 24 hour   Intake 591.1 ml   Output 2650 ml   Net -2058.9 ml         01/15/19  1930   Weight: (!) 154 kg (340 lb 4.8 oz)     SpO2 Percentage    01/16/19 0615 01/16/19 0635 01/16/19 0758   SpO2: 96% 96% 95%     Physical Exam   Constitutional: He appears well-developed and well-nourished. No distress.   HENT:   Head: Normocephalic and atraumatic.   Right Ear: External ear normal.   Left Ear: External ear normal.   Nose: Nose normal.   Eyes: Pupils are equal, round, and reactive to light. Right eye exhibits no discharge. Left eye exhibits no discharge.   Neck: Normal range of motion. Neck supple.   thick   Cardiovascular: Normal rate and regular rhythm.   No murmur heard.  Pulmonary/Chest: He has decreased breath sounds in the left upper field and the left lower field.   Chest tube to left lateral chest, secured, attached to Pleur-evac and suction   Abdominal: Soft. Bowel sounds are normal. He exhibits distension.   Musculoskeletal: He exhibits edema. He exhibits no deformity.   Neurological:   Intubated and sedated    Skin: Skin is warm and dry. No rash noted. He is not diaphoretic. No erythema.   Psychiatric: He has a normal mood and affect. His behavior is normal. Thought content normal.   Nursing note and vitals reviewed.    Ventilator Settings:     Vt  (Set, L): 0.6 L(changed per Dr. Ott and Thalia Mix, APRN)  Resp Rate (Set): 18  Pressure Support (cm H2O): 0 cm H20  FiO2 (%): 70 %(changed per Dr. Ott and Thalia Mix, APRN)  PEEP/CPAP (cm H2O): 8 cm H20(changed per Dr. Ott and Thalia Mix, APRN)  Minute Ventilation (L/min) (Obs): 12.8 L/min  Resp Rate (Observed) Vent: 18  I:E Ratio (Set): 1:1.60  I:E Ratio (Obs): 1:1.1  PIP Observed (cm H2O): 34 cm H2O  Plateau Pressure (cm H2O): 26 cm H2O     Results from last 7 days   Lab Units 01/16/19  0306 01/15/19  2133   WBC 10*3/mm3 32.37* 25.17*   HEMOGLOBIN g/dL 12.4* 12.2*   PLATELETS 10*3/mm3 376 310     Results from last 7 days   Lab Units 01/16/19  0306 01/15/19  2133   SODIUM mmol/L 134* 133*   POTASSIUM mmol/L 4.5 5.1   CO2 mmol/L 30.0 31.0   BUN mg/dL 67* 70*   CREATININE mg/dL 1.40 1.87*   MAGNESIUM mg/dL 2.5* 2.3*   PHOSPHORUS mg/dL 3.0 4.9*   GLUCOSE mg/dL 414* 495*     Results from last 7 days   Lab Units 01/16/19  0335 01/16/19  0004 01/15/19  2010   PH, ARTERIAL pH units 7.498* 7.453* 7.327*   PCO2, ARTERIAL mm Hg 37.7 41.7 57.3*   PO2 ART mm Hg 83.7 74.7* 68.1*   FIO2 % 100 100 100        Lab Results   Component Value Date    PROBNP 691.0 01/16/2019     Recent radiology:   Imaging Results (last 72 hours)     Procedure Component Value Units Date/Time    XR Chest 1 View [[id number]] Updated:  01/16/19 0805    CT Chest Without Contrast [[id number]] Collected:  01/16/19 0725     Updated:  01/16/19 0735    Narrative:       EXAMINATION:   CT CHEST WO CONTRAST-  1/16/2019 7:25 AM CST     CT CHEST WO CONTRAST- 1/15/2019 10:47 PM CST     HISTORY: Pneumonia complicated / unresolved     COMPARISON: None     DOSE LENGTH PRODUCT: 1112 mGy cm. Automated exposure control was also  utilized to decrease patient radiation dose.     TECHNIQUE: Serial helical tomographic images of the chest were acquired.  Multiplanar reformatted images were provided for review.     FINDINGS:  The imaged portion of the neck  and thyroid gland is unremarkable.      Infiltrates present in the right lower lobe. Opacification of the left  hemithorax. There is consolidation of the left lung and a moderate to  large left pleural complex. Only a small segment of the left upper lobe  is aerated.. The left pleural complex is large pleural complex. Is may  be causing compression atelectasis of the consolidated left lung.  Tracheal tube is present. Nasogastric tube is satisfactorily position..     The heart, great vessels, and pulmonary vessels are normal in appearance  within limits of a noncontrast study. There is no pericardial effusion.  No enlarged axillary or mediastinal lymph nodes are present.      No acute findings are seen in the bones or surrounding soft tissues.     Calculi are noted in the gallbladder.       Impression:       1. Large left pleural complex. There is consolidation of the left lung.  Only a small segment of the left upper lobe is aerated.  2. Small infiltrate right lower lobe        This report was finalized on 01/16/2019 07:32 by Dr. Jerrod Nunez MD.    XR Chest 1 View [[id number]] Collected:  01/16/19 0700     Updated:  01/16/19 0705    Narrative:       History:  58-year-old to confirm endotracheal tube placement.     Reference:  CT chest 1 day prior.     Findings:  Frontal chest radiograph performed.     There remains consolidation in the right lower lobe. Large left pleural  effusion compressing the majority the left lung. Alternatively this  could be extensive left lung pneumonia with parapneumonic fluid.  Atherosclerotic calcifications. No pneumothorax. Enteric tube extends  below diaphragm, tip not imaged. Endotracheal tube is in good position  at the level of the sternoclavicular joints.          Impression:       Satisfactory endotracheal tube.  This report was finalized on 01/16/2019 07:02 by Dr Jean-Pierre Solano, .    XR Chest 1 View [[id number]] Collected:  01/15/19 2003     Updated:  01/15/19 2008    Narrative:        EXAMINATION: Chest 1 view 1/15/2019     HISTORY: Respiratory failure. Mechanical ventilation     FINDINGS: Endotracheal tube projects at the T3 level. There is volume  loss on the left with mediastinal shift to the left. There is  consolidation within the medial aspect of the left upper lobe as well as  consolidation of the left lower lobe with silhouetting of the left  hemidiaphragm. It is difficult to ascertain whether this represents  atelectasis related to mucoid impaction versus a neoplastic process. I  would suggest follow-up with an enhanced CT of the chest for further  assessment. There is also a nodular opacity within the right midlung  zone which could be infectious in nature but which could also be  assessed at the time of CT imaging. The right lung is otherwise clear.       Impression:       1.. Consolidation and volume loss within both the left upper and left  lower lobe with silhouetting of the left diaphragm, heart and left  mediastinum. It is difficult to ascertain whether this represents mucoid  impaction with partial collapse of segments of the left lung versus a  neoplastic process. CT imaging with IV contrast is recommended.  2. Nodular opacity within the right midlung zone either related to  ongoing infiltrate versus a neoplastic process.  3. Endotracheal tube well-positioned.  This report was finalized on 01/15/2019 20:05 by Dr. Rudy Ahmadi MD.        My radiograph interpretation/independent review of imaging: Endotracheal tube in good position, some interstitial changes in the right midlung, large amount of consolidation in the left lung with a large parapneumonic effusion    Other test results (not lab or imaging): none    Independent review of ekg: Normal sinus rhythm, rate 98, QT interval 441    Problem List as identified by Epic (may contain historical, inactive problems)  Patient Active Problem List   Diagnosis   • Controlled type 2 diabetes mellitus without complication, with  long-term current use of insulin (CMS/[facility])   • Hyperlipidemia   • Vitamin D deficiency   • Essential hypertension   • Upper respiratory tract infection   • PNA (pneumonia)   • Acute respiratory failure with hypercapnia (CMS/[facility])     Pulmonary Assessment:  SEVERE ACUTE RESPIRATORY FAILURE REQUIRING MECHANICAL VENTILATION  This is a threat to life or pulmonary function, high risk, due to  Complicated pneumonia    New problem (to me), with additional workup planned: Parapneumonic effusion    New problem (to me), no additional workup planned: Diabetes type II, defer to attending    Other problems either stable, failing to improve or worsenin. Thyroid disease  2. Acute kidney injury  3. Hypotension requiring pressors  4. Sepsis    Recommend/plan:     · Adequate sedation and pain control for ventilated patient  · Neb treatments changed to scheduled  · Chest x-ray and ABG daily while on the ventilator  · Stress ulcer and DVT prophylaxis  · Rate reduced to 18  · Tidal volume reduced to 600  · PEEP increased to 7  · FiO2 reduced to 60  · Broad-spectrum antibiotic coverage  · Awaiting pleural fluid results.  We will defer management of chest tube to CT surgery    Electronically signed by LUIS E Pope on 2019 at 8:25 AM    Electronically signed by Thalia Mix APRN at 2019  8:35 AM     Berlin Oglesby MD at 2019  6:15 AM      Consult Orders    1. Inpatient Cardiothoracic Surgery Consult [[id number]] ordered by Emmanuel Palm MD at 19 0357                Referring Provider: Dr. Emmanuel Palm-hospitalist service  Reason for Consultation: Pleural effusion    Patient Care Team:  Francy Dumont MD as PCP - General (Family Medicine)  Erin Nolasco MA as Medical Assistant    Chief Complaint: Respiratory failure  Subjective .     History of Present illness:  Mr. Alan Carlin is a 58-year-old morbidly obese  male transferred from [facility] for respiratory failure.   At the present time he is intubated and sedated with propofol.  His wife is at bedside and tells me that he has had upper respiratory-type infections since November and he has not been able to get over these.  He has been given antibiotics and steroids but to no avail.  He began to become so short of breath that he could not walk across the room and he began coughing uncontrollably.  [facility] and was found to have a left lower lobe infiltrate consistent with pneumonia.  He was admitted and treated with IV antibiotics.  His respiratory status continued to worsen and required intubation.  He became hypotensive and was placed on dobutamine and transferred to [facility].    He has a history of obesity, diabetes, hypertension, elevated lipids.  He smoked in the past for 20 years but not for the last 20 years.    History:  Past Medical History:   Diagnosis Date   • Dyslipidemia    • Elevated blood pressure    • Essential hypertension    • Type II diabetes mellitus, uncontrolled (CMS/[facility])    ,   No past surgical history on file.,   Family History   Problem Relation Age of Onset   • Diabetes Other    • Thyroid disease Other    ,  Social History     Tobacco Use   • Smoking status: Former Smoker   • Smokeless tobacco: Never Used   Substance Use Topics   • Alcohol use: No   • Drug use: Not on file   ,  Medications Prior to Admission   Medication Sig Dispense Refill Last Dose   • atenolol (TENORMIN) 50 MG tablet Take 50 mg by mouth Daily.   Not Taking   • cetirizine (zyrTEC) 10 MG tablet Take 10 mg by mouth Daily.      • Empagliflozin (JARDIANCE) 10 MG tablet Take 10 mg by mouth Daily.      • fluticasone (FLONASE) 50 MCG/ACT nasal spray 2 sprays into the nostril(s) as directed by provider 2 (Two) Times a Day.      • hydrochlorothiazide (HYDRODIURIL) 25 MG tablet Take 25 mg by mouth Daily.      • lisinopril (PRINIVIL,ZESTRIL) 40 MG tablet Take 40 mg by mouth Daily.      •  metFORMIN (GLUCOPHAGE) 1000 MG tablet Take 1 tablet by mouth 2 (Two) Times a Day. 60 tablet 11    • ONE TOUCH ULTRA TEST test strip TEST FOUR TIMES DAILY 150 each 11    • predniSONE (DELTASONE) 10 MG tablet Take 10 mg by mouth 2 (Two) Times a Day. X 5 days      • promethazine-dextromethorphan (PROMETHAZINE-DM) 6.25-15 MG/5ML syrup Take 5 mL by mouth Every 6 (Six) Hours As Needed for Cough.      • rosuvastatin (CRESTOR) 5 MG tablet Take 5 mg by mouth Every Night.      • Insulin Lispro (HUMALOG KWIKPEN) 100 UNIT/ML solution pen-injector Inject 40 Units under the skin into the appropriate area as directed 3 (Three) Times a Day. 9 pen 5    • Insulin Pen Needle (B-D ULTRAFINE III SHORT PEN) 31G X 8 MM misc Inject 4 times daily 150 each 11    ,  Allergies: Patient has no known allergies.      Review of Systems   unable to obtain-patient intubated and sedated    Objective     Vital Signs:   Temp:  [97.8 °F (36.6 °C)-98 °F (36.7 °C)] 97.8 °F (36.6 °C)  Heart Rate:  [] 98  Resp:  [16-22] 22  BP: ()/(60-94) 117/77  FiO2 (%):  [90 %-100 %] 90 %    Physical Exam:  General:  Morbidly obese male in the ICU on the ventilator/sedated with propofol  Eyes: Pupils equal round and react to light.   ENT: Nose, mouth,throat are benign. Neck is supple without thyromegaly, mass or bruit.  Chest: almost no breath sounds on the left.  Has rales and wheezes on the right  Heart:  Regular rhythm with a tachycardia  Abdomen: obese without rebound or guarding  Pulses:  2+ bilateral and equal throughout.  Neurologic:  Unable to determine secondary to propofol sedation  LAB:   CBC:  Results from last 7 days   Lab Units 01/16/19  0306 01/15/19  2133   WBC 10*3/mm3 32.37* 25.17*   HEMATOCRIT % 38.1* 38.7*   PLATELETS 10*3/mm3 376 310        BMP:  Results from last 7 days   Lab Units 01/16/19  0306 01/15/19  2133   SODIUM mmol/L 134* 133*   POTASSIUM mmol/L 4.5 5.1   CHLORIDE mmol/L 91* 89*   CO2 mmol/L 30.0 31.0   GLUCOSE mg/dL 414* 495*    BUN mg/dL 67* 70*   CREATININE mg/dL 1.40 1.87*        COAG:  Results from last 7 days   Lab Units 01/15/19  2133   INR  1.68*   APTT seconds 39.2*           IMAGES:      Imaging Results (last 24 hours)     Procedure Component Value Units Date/Time    XR Chest 1 View [[id number]] Updated:  01/16/19 0441    CT Chest Without Contrast [[id number]] Updated:  01/15/19 2316    XR Chest 1 View [[id number]] Collected:  01/15/19 2003     Updated:  01/15/19 2008    Narrative:       EXAMINATION: Chest 1 view 1/15/2019     HISTORY: Respiratory failure. Mechanical ventilation     FINDINGS: Endotracheal tube projects at the T3 level. There is volume  loss on the left with mediastinal shift to the left. There is  consolidation within the medial aspect of the left upper lobe as well as  consolidation of the left lower lobe with silhouetting of the left  hemidiaphragm. It is difficult to ascertain whether this represents  atelectasis related to mucoid impaction versus a neoplastic process. I  would suggest follow-up with an enhanced CT of the chest for further  assessment. There is also a nodular opacity within the right midlung  zone which could be infectious in nature but which could also be  assessed at the time of CT imaging. The right lung is otherwise clear.       Impression:       1.. Consolidation and volume loss within both the left upper and left  lower lobe with silhouetting of the left diaphragm, heart and left  mediastinum. It is difficult to ascertain whether this represents mucoid  impaction with partial collapse of segments of the left lung versus a  neoplastic process. CT imaging with IV contrast is recommended.  2. Nodular opacity within the right midlung zone either related to  ongoing infiltrate versus a neoplastic process.  3. Endotracheal tube well-positioned.  This report was finalized on 01/15/2019 20:05 by Dr. Rudy Ahmadi MD.          ASSESSMENT:  CT scan of the chest showed large left pleural  "collection with collapse of the left lung.  Right lung has what appears to be patchy infiltrate.  Patient is morbidly obese with glucoses in the 400 range.  He might have been septic but certainly has severe pneumonia and large left pleural effusion/possible empyema.  He is on IV antibiotics.  No definitive cultures time aware.    I talked with the patient's wife.  He is quite ill.  He would not be an operative candidate at least at the present time.  We will try to place a left thoracostomy tube for drainage.  Hopefully it will be liquid enough that we can reexpand the lung.  However, he could have a empyema and entrapped lung.  He would be a poor operative candidate at the present time.  We do need a culture and antibiotic profile for correct antibiotic treatment.  Multiple other consults are ordered and in progress.    I talked with Mr. Carlin's wife.  We discussed thoracostomy tube placements indications options and possible complications.  She understands and wishes proceed          Plan:  Placement of left thoracostomy tube       Berlin Oglesby MD  01/16/19  6:16 AM        Electronically signed by Berlin Oglesby MD at 1/16/2019  6:29 AM     Loc Davies [facility] at 1/16/2019 12:39 AM          Pharmacy Dosing Service  Pharmacokinetics  Vancomycin Initial Evaluation    Assessment/Action/Plan:  Initiated Vancomycin 2,000 mg IVPB once, followed by 1,250 mg every 12 hours. Vancomycin levels not ordered at this time. Pharmacy will monitor renal function and adjust dose accordingly.     Subjective:  Alan Carlin is a 58 y.o. male with a Vancomycin \"Pharmacy to Dose\" consult for the treatment of Pneumonia .(x7 days)    Complicating factors:  Diabetes mellitus  Acute renal insufficiency  Morbid obesity    Objective:  Ht: 175.3 cm (69\"); Wt: (!) 154 kg (340 lb 4.8 oz)  Estimated Creatinine Clearance: 62.6 mL/min (A) (by C-G formula based on SCr of 1.87 mg/dL (H)).   Lab Results   Component Value Date    " CREATININE 1.87 (H) 01/15/2019      Lab Results   Component Value Date    WBC 25.17 (H) 01/15/2019      Baseline culture results:  Microbiology Results     Collected  Updated  Procedure      01/15/2019 2009  01/15/2019 2013  Respiratory Culture - Sputum, ET Suction [[id number]]   Sputum from ET Suction               Loc Davies RPH  01/16/19 12:37 AM      Electronically signed by Loc Davies RPH at 1/16/2019 12:39 AM

## 2019-01-16 NOTE — PLAN OF CARE
Problem: Patient Care Overview  Goal: Plan of Care Review  Outcome: Ongoing (interventions implemented as appropriate)   01/16/19 6662   OTHER   Outcome Summary Pt sedated on mechanical vent. RD consult for enteral nutrition. Start Impact 1.5 at 15 ml/hr for 6 hrs, then advance by 10 ml/hr every 4 hrs as tolerated until goal rate of 40 ml/hr achieved. Auto flush of 30 ml/hr per pump. Beneprotein 2 packets mixed with 200 ml water flush via OG tube. Con to follow.       Problem: Nutrition, Enteral (Adult)  Goal: Signs and Symptoms of Listed Potential Problems Will be Absent, Minimized or Managed (Nutrition, Enteral)  Outcome: Ongoing (interventions implemented as appropriate)

## 2019-01-16 NOTE — PROCEDURES
"Consent: . Verbal consent was obtained. Written consent was obtained.  Patient identity confirmed: hospital-assigned identification number  Time out: Immediately prior to procedure a \"time out\" was called to verify the correct patient, procedure, equipment, support staff and site/side marked as required.  Indications: vascular access  Anesthesia: local infiltration   Anesthesia:  Anesthesia: local infiltration  Local Anesthetic: lidocaine 1% without epinephrine   Sedation:  Patient sedated: yes  Preparation: skin prepped with 2% chlorhexidine  Skin prep agent dried: skin prep agent completely dried prior to procedure  Sterile barriers: all five maximum sterile barriers used - cap, mask, sterile gown, sterile gloves, and large sterile sheet  Hand hygiene: hand hygiene performed prior to central venous catheter insertion  Location details: left femoral  Patient position: trendelenburg  Catheter type: triple lumen  Ultrasound guidance: no  Number of attempts: 2  Successful placement: yes  Post-procedure: line sutured and dressing applied  Assessment: blood return through all ports  No complications  EBL 10cc      "

## 2019-01-16 NOTE — CONSULTS
PULMONARY AND CRITICAL CARE CONSULT - [facility]    Alan Carlin   MR# [id number]  Acct# [id number]  1/16/2019   8:25 AM    Referring Provider: Aniceto Ott MD    Chief Complaint: Mechanically ventilated    HPI: We are consulted by Anicteo Ott MD to see this 58 y.o. male born on 1960.  Patient is presently intubated and sedated and unable to provide any information.  Per my review the records and talking with the staff the patient was started from an [facility] facility for issues with a complicated pneumonia and pleural effusion.  Per the records he indicated he had been sick for several weeks with an upper respiratory illness.  He had been on several rounds of antibiotics and steroids without improvement.  He is employed as a  and had gone to work but was unable to ambulate across the room without becoming short of breath [facility]  He is since been intubated.  He has received fluid resuscitation for sepsis.  He is on IV antibiotics as well as pressors.  Blood pressure prior to chest tube placement was in the 70s systolic.  Blood pressure is now in the 130s systolic post evacuation of the large fluid collection.  There is presently no family at the bedside.  Rate, tidal volume have been reduced, PEEP has been increased and FiO2 is also been reduced.  His nebs and then changed to scheduled as opposed to when necessary.  Plans later in the day for placing an arterial line.     Past Medical History   has a past medical history of Dyslipidemia, Elevated blood pressure, Essential hypertension, and Type II diabetes mellitus, uncontrolled (CMS/[facility]).   has no past surgical history on file.  No Known Allergies  Medications    cefepime 2 g Intravenous Q8H   enoxaparin 40 mg Subcutaneous Q24H   famotidine 20 mg Intravenous BID   guaiFENesin 400 mg Oral Q8H   levoFLOXacin 750 mg Intravenous Q24H   lidocaine      lidocaine      Morphine  (PF)      Morphine 5 mg Intravenous Once   Morphine sulfate (PF)      Morphine 2 mg Intravenous Once   sodium chloride 3 mL Intravenous Q12H   vancomycin 1,250 mg Intravenous Q12H       insulin regular infusion 1 unit/mL 1-20 Units/hr    norepinephrine 0.02-0.3 mcg/kg/min Last Rate: 0.04 mcg/kg/min (01/16/19 0519)   propofol 5-50 mcg/kg/min Last Rate: 40 mcg/kg/min (01/16/19 0610)   Sodium chloride 50 mL/hr Last Rate: 50 mL/hr (01/16/19 0041)     Social History   reports that he has quit smoking. he has never used smokeless tobacco. He reports that he does not drink alcohol.  Family History  family history includes Diabetes in his other; Thyroid disease in his other.  Review of Systems:    Cannot obtain due to mechanical ventilation.  The patient notably is critically ill and connected to a ventilator.  As such patient cannot communicate and provide any history whatsoever, including any history of present illness or interval history since arrival or review of systems. The interested reviewer may note this fact, as an attempt has been made at collecting and documenting these portions of the patient history, but this information is unobtainable despite attempted review and therefore cannot be documented at this time.     Physical Exam:  Temp:  [97.8 °F (36.6 °C)-98 °F (36.7 °C)] 97.8 °F (36.6 °C)  Heart Rate:  [] 103  Resp:  [16-22] 18  BP: ()/(60-94) 118/77  FiO2 (%):  [70 %-100 %] 70 %    Intake/Output Summary (Last 24 hours) at 1/16/2019 0825  Last data filed at 1/16/2019 0400  Gross per 24 hour   Intake 591.1 ml   Output 2650 ml   Net -2058.9 ml         01/15/19  1930   Weight: (!) 154 kg (340 lb 4.8 oz)     SpO2 Percentage    01/16/19 0615 01/16/19 0635 01/16/19 0758   SpO2: 96% 96% 95%     Physical Exam   Constitutional: He appears well-developed and well-nourished. No distress.   HENT:   Head: Normocephalic and atraumatic.   Right Ear: External ear normal.   Left Ear: External ear normal.   Nose:  Nose normal.   Eyes: Pupils are equal, round, and reactive to light. Right eye exhibits no discharge. Left eye exhibits no discharge.   Neck: Normal range of motion. Neck supple.   thick   Cardiovascular: Normal rate and regular rhythm.   No murmur heard.  Pulmonary/Chest: He has decreased breath sounds in the left upper field and the left lower field.   Chest tube to left lateral chest, secured, attached to Pleur-evac and suction   Abdominal: Soft. Bowel sounds are normal. He exhibits distension.   Musculoskeletal: He exhibits edema. He exhibits no deformity.   Neurological:   Intubated and sedated    Skin: Skin is warm and dry. No rash noted. He is not diaphoretic. No erythema.   Psychiatric: He has a normal mood and affect. His behavior is normal. Thought content normal.   Nursing note and vitals reviewed.    Ventilator Settings:     Vt (Set, L): 0.6 L(changed per Dr. Ott and Thalia Mix, APRN)  Resp Rate (Set): 18  Pressure Support (cm H2O): 0 cm H20  FiO2 (%): 70 %(changed per Dr. Ott and Thalia Mix, APRN)  PEEP/CPAP (cm H2O): 8 cm H20(changed per Dr. Ott and Thalia Mix, APRN)  Minute Ventilation (L/min) (Obs): 12.8 L/min  Resp Rate (Observed) Vent: 18  I:E Ratio (Set): 1:1.60  I:E Ratio (Obs): 1:1.1  PIP Observed (cm H2O): 34 cm H2O  Plateau Pressure (cm H2O): 26 cm H2O     Results from last 7 days   Lab Units 01/16/19  0306 01/15/19  2133   WBC 10*3/mm3 32.37* 25.17*   HEMOGLOBIN g/dL 12.4* 12.2*   PLATELETS 10*3/mm3 376 310     Results from last 7 days   Lab Units 01/16/19  0306 01/15/19  2133   SODIUM mmol/L 134* 133*   POTASSIUM mmol/L 4.5 5.1   CO2 mmol/L 30.0 31.0   BUN mg/dL 67* 70*   CREATININE mg/dL 1.40 1.87*   MAGNESIUM mg/dL 2.5* 2.3*   PHOSPHORUS mg/dL 3.0 4.9*   GLUCOSE mg/dL 414* 495*     Results from last 7 days   Lab Units 01/16/19  0335 01/16/19  0004 01/15/19  2010   PH, ARTERIAL pH units 7.498* 7.453* 7.327*   PCO2, ARTERIAL mm Hg 37.7 41.7 57.3*   PO2 ART mm Hg 83.7  74.7* 68.1*   FIO2 % 100 100 100        Lab Results   Component Value Date    PROBNP 691.0 01/16/2019     Recent radiology:   Imaging Results (last 72 hours)     Procedure Component Value Units Date/Time    XR Chest 1 View [[id number]] Updated:  01/16/19 0805    CT Chest Without Contrast [[id number]] Collected:  01/16/19 0725     Updated:  01/16/19 0735    Narrative:       EXAMINATION:   CT CHEST WO CONTRAST-  1/16/2019 7:25 AM CST     CT CHEST WO CONTRAST- 1/15/2019 10:47 PM CST     HISTORY: Pneumonia complicated / unresolved     COMPARISON: None     DOSE LENGTH PRODUCT: 1112 mGy cm. Automated exposure control was also  utilized to decrease patient radiation dose.     TECHNIQUE: Serial helical tomographic images of the chest were acquired.  Multiplanar reformatted images were provided for review.     FINDINGS:  The imaged portion of the neck and thyroid gland is unremarkable.      Infiltrates present in the right lower lobe. Opacification of the left  hemithorax. There is consolidation of the left lung and a moderate to  large left pleural complex. Only a small segment of the left upper lobe  is aerated.. The left pleural complex is large pleural complex. Is may  be causing compression atelectasis of the consolidated left lung.  Tracheal tube is present. Nasogastric tube is satisfactorily position..     The heart, great vessels, and pulmonary vessels are normal in appearance  within limits of a noncontrast study. There is no pericardial effusion.  No enlarged axillary or mediastinal lymph nodes are present.      No acute findings are seen in the bones or surrounding soft tissues.     Calculi are noted in the gallbladder.       Impression:       1. Large left pleural complex. There is consolidation of the left lung.  Only a small segment of the left upper lobe is aerated.  2. Small infiltrate right lower lobe        This report was finalized on 01/16/2019 07:32 by Dr. Jerrod Nunez MD.    XR Chest 1 View [[id number]]  Collected:  01/16/19 0700     Updated:  01/16/19 0705    Narrative:       History:  58-year-old to confirm endotracheal tube placement.     Reference:  CT chest 1 day prior.     Findings:  Frontal chest radiograph performed.     There remains consolidation in the right lower lobe. Large left pleural  effusion compressing the majority the left lung. Alternatively this  could be extensive left lung pneumonia with parapneumonic fluid.  Atherosclerotic calcifications. No pneumothorax. Enteric tube extends  below diaphragm, tip not imaged. Endotracheal tube is in good position  at the level of the sternoclavicular joints.          Impression:       Satisfactory endotracheal tube.  This report was finalized on 01/16/2019 07:02 by Dr Jean-Pierre Solano, .    XR Chest 1 View [[id number]] Collected:  01/15/19 2003     Updated:  01/15/19 2008    Narrative:       EXAMINATION: Chest 1 view 1/15/2019     HISTORY: Respiratory failure. Mechanical ventilation     FINDINGS: Endotracheal tube projects at the T3 level. There is volume  loss on the left with mediastinal shift to the left. There is  consolidation within the medial aspect of the left upper lobe as well as  consolidation of the left lower lobe with silhouetting of the left  hemidiaphragm. It is difficult to ascertain whether this represents  atelectasis related to mucoid impaction versus a neoplastic process. I  would suggest follow-up with an enhanced CT of the chest for further  assessment. There is also a nodular opacity within the right midlung  zone which could be infectious in nature but which could also be  assessed at the time of CT imaging. The right lung is otherwise clear.       Impression:       1.. Consolidation and volume loss within both the left upper and left  lower lobe with silhouetting of the left diaphragm, heart and left  mediastinum. It is difficult to ascertain whether this represents mucoid  impaction with partial collapse of segments of the left lung  versus a  neoplastic process. CT imaging with IV contrast is recommended.  2. Nodular opacity within the right midlung zone either related to  ongoing infiltrate versus a neoplastic process.  3. Endotracheal tube well-positioned.  This report was finalized on 01/15/2019 20:05 by Dr. Rudy Ahmadi MD.        My radiograph interpretation/independent review of imaging: Endotracheal tube in good position, some interstitial changes in the right midlung, large amount of consolidation in the left lung with a large parapneumonic effusion    Other test results (not lab or imaging): none    Independent review of ekg: Normal sinus rhythm, rate 98, QT interval 441    Problem List as identified by Epic (may contain historical, inactive problems)  Patient Active Problem List   Diagnosis   • Controlled type 2 diabetes mellitus without complication, with long-term current use of insulin (CMS/[facility])   • Hyperlipidemia   • Vitamin D deficiency   • Essential hypertension   • Upper respiratory tract infection   • PNA (pneumonia)   • Acute respiratory failure with hypercapnia (CMS/[facility])     Pulmonary Assessment:  SEVERE ACUTE RESPIRATORY FAILURE REQUIRING MECHANICAL VENTILATION  This is a threat to life or pulmonary function, high risk, due to  Complicated pneumonia    New problem (to me), with additional workup planned: Parapneumonic effusion    New problem (to me), no additional workup planned: Diabetes type II, defer to attending    Other problems either stable, failing to improve or worsenin. Thyroid disease  2. Acute kidney injury  3. Hypotension requiring pressors  4. Sepsis    Recommend/plan:     · Adequate sedation and pain control for ventilated patient  · Neb treatments changed to scheduled  · Chest x-ray and ABG daily while on the ventilator  · Stress ulcer and DVT prophylaxis  · Rate reduced to 18  · Tidal volume reduced to 600  · PEEP increased to 7  · FiO2 reduced to 60  · Broad-spectrum antibiotic  coverage  · Awaiting pleural fluid results.  We will defer management of chest tube to CT surgery    Electronically signed by LUIS E Pope on 1/16/2019 at 8:25 AM     Patient who is transferred to this facility intubated mechanically ventilated with severe sepsis, shock hypotension and diabetes.  Chest x-ray shows patchy infiltrates bilaterally.  Gastric tube in the stomach.  Prior to my arrival he had a large pleural effusion drained with a chest tube placed and pleural fluid has been sent, results pending.  We have adjusted the ventilator.  Chest exam shows diminished breath sounds peak pressure is 32 and there is no dyssynchrony.  Abdomen is obese and the neck is very thick.  Initiate ventilator bundle DVT and ulcer prophylaxis, sedation with propofol appears to be effective.    I have seen and examined patient personally, performing a face-to-face diagnostic evaluation with plan of care reviewed and developed with APRN and nursing staff. I have addended and/or modified the above history of present illness, physical examination, and assessment and plan to reflect my findings and impressions. Essential elements of the care plan were discussed with APRN above.  Agree with findings and assessment/plan as documented above.    Electronically signed by Damion Wright MD, on 1/16/2019, 3:38 PM    EMR Dragon/Transcription disclaimer: Much of this encounter note is an electronic transcription/translation of spoken language to printed text. The electronic translation of spoken language may permit erroneous, or at times, nonsensical words or phrases to be inadvertently transcribed; although I have reviewed the note for such errors, some may still exist.

## 2019-01-16 NOTE — PROCEDURES
"Insert Arterial Line  Date/Time: 1/16/2019 3:10 PM  Performed by: Aniceto Ott MD  Authorized by: Aniceto Ott MD   Consent: Verbal consent obtained.  Risks and benefits: risks, benefits and alternatives were discussed  Consent given by: power of   Required items: required blood products, implants, devices, and special equipment available  Patient identity confirmed: verbally with patient and hospital-assigned identification number  Time out: Immediately prior to procedure a \"time out\" was called to verify the correct patient, procedure, equipment, support staff and site/side marked as required.  Preparation: Patient was prepped and draped in the usual sterile fashion.  Indications: multiple ABGs, respiratory failure and hemodynamic monitoring  Location: left radial    Sedation:  Patient sedated: yes  Sedatives: propofol  Vitals: Vital signs were monitored during sedation.    Devon's test normal: yes  Needle gauge: 20  Seldinger technique: Seldinger technique used  Cutdown: cutdown required  Number of attempts: 1  Post-procedure: line sutured and dressing applied  Post-procedure CMS: normal  Patient tolerance: Patient tolerated the procedure well with no immediate complications              "

## 2019-01-16 NOTE — PROGRESS NOTES
[facility] Medicine Services  INPATIENT PROGRESS NOTE    Patient Name: Alan Carlin  Date of Admission: 1/15/2019  Today's Date: 01/16/19  Length of Stay: 1  Primary Care Physician: Francy Dumont MD    Subjective   Chief Complaint: shortness of breath  HPI     Patient seen and examined at bedside.  Left chest tube placed this morning.  BP improved.    Family updated at bedside.          Review of Systems   Unable to perform ROS: Intubated      All pertinent negatives and positives are as above. All other systems have been reviewed and are negative unless otherwise stated.     Objective    Temp:  [97.8 °F (36.6 °C)-98 °F (36.7 °C)] 97.8 °F (36.6 °C)  Heart Rate:  [] 103  Resp:  [16-22] 18  BP: ()/(60-94) 118/77  FiO2 (%):  [70 %-100 %] 70 %  Physical Exam   Constitutional: He appears toxic. He appears ill. No distress. He is intubated.   HENT:   Head: Normocephalic and atraumatic.   Mouth/Throat: Oropharynx is clear and moist and mucous membranes are normal. Mucous membranes are not dry.   Endotracheal tube present; orogastric tube in place   Eyes: Conjunctivae are normal. No scleral icterus.   Neck: No JVD present. Carotid bruit is not present.   Cardiovascular: Regular rhythm, normal heart sounds, intact distal pulses and normal pulses. Tachycardia present.   Pulmonary/Chest: He is intubated. No respiratory distress.   Left sided lateral chest tube in place on suction        Abdominal: Soft. Normal appearance and bowel sounds are normal. He exhibits no distension. There is no tenderness.   Genitourinary:         Neurological:   Sedated    Skin: Skin is warm and dry. He is not diaphoretic.   Psychiatric:   Sedated   Nursing note and vitals reviewed.          Results Review:  I have reviewed the labs, radiology results, and diagnostic studies.    Laboratory Data:   Results from last 7 days   Lab Units 01/16/19  0306 01/15/19  2133   WBC 10*3/mm3 32.37* 25.17*    HEMOGLOBIN g/dL 12.4* 12.2*   HEMATOCRIT % 38.1* 38.7*   PLATELETS 10*3/mm3 376 310        Results from last 7 days   Lab Units 01/16/19  0306 01/15/19  2133   SODIUM mmol/L 134* 133*   POTASSIUM mmol/L 4.5 5.1   CHLORIDE mmol/L 91* 89*   CO2 mmol/L 30.0 31.0   BUN mg/dL 67* 70*   CREATININE mg/dL 1.40 1.87*   CALCIUM mg/dL 9.1 8.8   BILIRUBIN mg/dL 0.7 0.5   ALK PHOS U/L 116 115   ALT (SGPT) U/L 27 24   AST (SGOT) U/L 52* 35   GLUCOSE mg/dL 414* 495*       Culture Data:   [facility]    Radiology Data:   Imaging Results (last 24 hours)     Procedure Component Value Units Date/Time    XR Chest 1 View [[id number]] Updated:  01/16/19 0805    CT Chest Without Contrast [[id number]] Collected:  01/16/19 0725     Updated:  01/16/19 0735    Narrative:       EXAMINATION:   CT CHEST WO CONTRAST-  1/16/2019 7:25 AM CST     CT CHEST WO CONTRAST- 1/15/2019 10:47 PM CST     HISTORY: Pneumonia complicated / unresolved     COMPARISON: None     DOSE LENGTH PRODUCT: 1112 mGy cm. Automated exposure control was also  utilized to decrease patient radiation dose.     TECHNIQUE: Serial helical tomographic images of the chest were acquired.  Multiplanar reformatted images were provided for review.     FINDINGS:  The imaged portion of the neck and thyroid gland is unremarkable.      Infiltrates present in the right lower lobe. Opacification of the left  hemithorax. There is consolidation of the left lung and a moderate to  large left pleural complex. Only a small segment of the left upper lobe  is aerated.. The left pleural complex is large pleural complex. Is may  be causing compression atelectasis of the consolidated left lung.  Tracheal tube is present. Nasogastric tube is satisfactorily position..     The heart, great vessels, and pulmonary vessels are normal in appearance  within limits of a noncontrast study. There is no pericardial effusion.  No enlarged axillary or mediastinal lymph nodes are present.      No acute findings are  seen in the bones or surrounding soft tissues.     Calculi are noted in the gallbladder.       Impression:       1. Large left pleural complex. There is consolidation of the left lung.  Only a small segment of the left upper lobe is aerated.  2. Small infiltrate right lower lobe        This report was finalized on 01/16/2019 07:32 by Dr. Jerrod Nunez MD.    XR Chest 1 View [[id number]] Collected:  01/16/19 0700     Updated:  01/16/19 0705    Narrative:       History:  58-year-old to confirm endotracheal tube placement.     Reference:  CT chest 1 day prior.     Findings:  Frontal chest radiograph performed.     There remains consolidation in the right lower lobe. Large left pleural  effusion compressing the majority the left lung. Alternatively this  could be extensive left lung pneumonia with parapneumonic fluid.  Atherosclerotic calcifications. No pneumothorax. Enteric tube extends  below diaphragm, tip not imaged. Endotracheal tube is in good position  at the level of the sternoclavicular joints.          Impression:       Satisfactory endotracheal tube.  This report was finalized on 01/16/2019 07:02 by Dr Jean-Pierre Solano, .    XR Chest 1 View [[id number]] Collected:  01/15/19 2003     Updated:  01/15/19 2008    Narrative:       EXAMINATION: Chest 1 view 1/15/2019     HISTORY: Respiratory failure. Mechanical ventilation     FINDINGS: Endotracheal tube projects at the T3 level. There is volume  loss on the left with mediastinal shift to the left. There is  consolidation within the medial aspect of the left upper lobe as well as  consolidation of the left lower lobe with silhouetting of the left  hemidiaphragm. It is difficult to ascertain whether this represents  atelectasis related to mucoid impaction versus a neoplastic process. I  would suggest follow-up with an enhanced CT of the chest for further  assessment. There is also a nodular opacity within the right midlung  zone which could be infectious in nature but which  could also be  assessed at the time of CT imaging. The right lung is otherwise clear.       Impression:       1.. Consolidation and volume loss within both the left upper and left  lower lobe with silhouetting of the left diaphragm, heart and left  mediastinum. It is difficult to ascertain whether this represents mucoid  impaction with partial collapse of segments of the left lung versus a  neoplastic process. CT imaging with IV contrast is recommended.  2. Nodular opacity within the right midlung zone either related to  ongoing infiltrate versus a neoplastic process.  3. Endotracheal tube well-positioned.  This report was finalized on 01/15/2019 20:05 by Dr. Rudy Ahmadi MD.          I have reviewed the patient's current medications.     Assessment/Plan     Active Hospital Problems    Diagnosis   • PNA (pneumonia)   • Acute respiratory failure with hypercapnia (CMS/HCC)   • Controlled type 2 diabetes mellitus without complication, with long-term current use of insulin (CMS/HCC)       Assessment:  1) Acute hypoxic and acute on chronic hypercapnia respiratory failure due to left-sided pleural effusion and right lower lobe community-acquired pneumonia complicated by leukocytosis  2) Septic shock due to community-acquired pneumonia  3) Acute kidney injury, improved  4) Morbid obesity   5) Respiratory alkalosis  6) Normocytic anemia  7) Suppressed TSH, normal Free T4  8) Type 2 DM complicated by hyperglycemia    Plan:  1) CT surgery consult, appreciate assistance.  Chest tube placed.  2) Increase PEEP, decrease FIO2.  Vent management discussed with pulmonary who is consulted, appreciate their assistance.  TV ~8mL/kg IDBW  3) BC x 2 pending  4) Respiratory culture pending  5) CT to suction  6) Continue levophed for MAP >65  7) Continue cefepime, levofloxacin, vancomycin   8) Insulin gtt for hyperglycemia  9) GI Famotidine, DVT Enoxaparin   10) Duonebs q4h scheduled  11) Strep and legionella antigen  negative      Approximately 45 minutes of critical care time were spent managing the patient exclusive of billable procedures.  Included time discussing with bedside RN (Maria Victoria), RT (Raul), pulmonary (Thalia).  This also included adjusting drips and adjusting ventilator at bedside.    Discharge Planning: I expect the patient to be discharged to ? in ? days.    Aniceto Ott MD   01/16/19   8:19 AM

## 2019-01-16 NOTE — CONSULTS
Referring Provider: Dr. Emmanuel Palm-hospitalist service  Reason for Consultation: Pleural effusion    Patient Care Team:  Francy Dumont MD as PCP - General (Family Medicine)  Erin Nolasco MA as Medical Assistant    Chief Complaint: Respiratory failure  Subjective .     History of Present illness:  Mr. Alan Carlin is a 58-year-old morbidly obese  male transferred from [facility] for respiratory failure.  At the present time he is intubated and sedated with propofol.  His wife is at bedside and tells me that he has had upper respiratory-type infections since November and he has not been able to get over these.  He has been given antibiotics and steroids but to no avail.  He began to become so short of breath that he could not walk across the room and he began coughing uncontrollably.  He presented to the emergency room at [facility] and was found to have a left lower lobe infiltrate consistent with pneumonia.  He was admitted and treated with IV antibiotics.  His respiratory status continued to worsen and required intubation.  He became hypotensive and was placed on dobutamine and transferred to [facility].    He has a history of obesity, diabetes, hypertension, elevated lipids.  He smoked in the past for 20 years but not for the last 20 years.    History:  Past Medical History:   Diagnosis Date   • Dyslipidemia    • Elevated blood pressure    • Essential hypertension    • Type II diabetes mellitus, uncontrolled (CMS/[facility])    ,   No past surgical history on file.,   Family History   Problem Relation Age of Onset   • Diabetes Other    • Thyroid disease Other    ,  Social History     Tobacco Use   • Smoking status: Former Smoker   • Smokeless tobacco: Never Used   Substance Use Topics   • Alcohol use: No   • Drug use: Not on file   ,  Medications Prior to Admission   Medication Sig Dispense Refill Last Dose   • atenolol (TENORMIN) 50 MG tablet Take 50 mg by mouth Daily.    Not Taking   • cetirizine (zyrTEC) 10 MG tablet Take 10 mg by mouth Daily.      • Empagliflozin (JARDIANCE) 10 MG tablet Take 10 mg by mouth Daily.      • fluticasone (FLONASE) 50 MCG/ACT nasal spray 2 sprays into the nostril(s) as directed by provider 2 (Two) Times a Day.      • hydrochlorothiazide (HYDRODIURIL) 25 MG tablet Take 25 mg by mouth Daily.      • lisinopril (PRINIVIL,ZESTRIL) 40 MG tablet Take 40 mg by mouth Daily.      • metFORMIN (GLUCOPHAGE) 1000 MG tablet Take 1 tablet by mouth 2 (Two) Times a Day. 60 tablet 11    • ONE TOUCH ULTRA TEST test strip TEST FOUR TIMES DAILY 150 each 11    • predniSONE (DELTASONE) 10 MG tablet Take 10 mg by mouth 2 (Two) Times a Day. X 5 days      • promethazine-dextromethorphan (PROMETHAZINE-DM) 6.25-15 MG/5ML syrup Take 5 mL by mouth Every 6 (Six) Hours As Needed for Cough.      • rosuvastatin (CRESTOR) 5 MG tablet Take 5 mg by mouth Every Night.      • Insulin Lispro (HUMALOG KWIKPEN) 100 UNIT/ML solution pen-injector Inject 40 Units under the skin into the appropriate area as directed 3 (Three) Times a Day. 9 pen 5    • Insulin Pen Needle (B-D ULTRAFINE III SHORT PEN) 31G X 8 MM misc Inject 4 times daily 150 each 11    ,  Allergies: Patient has no known allergies.      Review of Systems   unable to obtain-patient intubated and sedated    Objective     Vital Signs:   Temp:  [97.8 °F (36.6 °C)-98 °F (36.7 °C)] 97.8 °F (36.6 °C)  Heart Rate:  [] 98  Resp:  [16-22] 22  BP: ()/(60-94) 117/77  FiO2 (%):  [90 %-100 %] 90 %    Physical Exam:  General:  Morbidly obese male in the ICU on the ventilator/sedated with propofol  Eyes: Pupils equal round and react to light.   ENT: Nose, mouth,throat are benign. Neck is supple without thyromegaly, mass or bruit.  Chest: almost no breath sounds on the left.  Has rales and wheezes on the right  Heart:  Regular rhythm with a tachycardia  Abdomen: obese without rebound or guarding  Pulses:  2+ bilateral and equal  throughout.  Neurologic:  Unable to determine secondary to propofol sedation  LAB:   CBC:  Results from last 7 days   Lab Units 01/16/19  0306 01/15/19  2133   WBC 10*3/mm3 32.37* 25.17*   HEMATOCRIT % 38.1* 38.7*   PLATELETS 10*3/mm3 376 310        BMP:  Results from last 7 days   Lab Units 01/16/19  0306 01/15/19  2133   SODIUM mmol/L 134* 133*   POTASSIUM mmol/L 4.5 5.1   CHLORIDE mmol/L 91* 89*   CO2 mmol/L 30.0 31.0   GLUCOSE mg/dL 414* 495*   BUN mg/dL 67* 70*   CREATININE mg/dL 1.40 1.87*        COAG:  Results from last 7 days   Lab Units 01/15/19  2133   INR  1.68*   APTT seconds 39.2*           IMAGES:      Imaging Results (last 24 hours)     Procedure Component Value Units Date/Time    XR Chest 1 View [[id number]] Updated:  01/16/19 0441    CT Chest Without Contrast [[id number]] Updated:  01/15/19 2316    XR Chest 1 View [[id number]] Collected:  01/15/19 2003     Updated:  01/15/19 2008    Narrative:       EXAMINATION: Chest 1 view 1/15/2019     HISTORY: Respiratory failure. Mechanical ventilation     FINDINGS: Endotracheal tube projects at the T3 level. There is volume  loss on the left with mediastinal shift to the left. There is  consolidation within the medial aspect of the left upper lobe as well as  consolidation of the left lower lobe with silhouetting of the left  hemidiaphragm. It is difficult to ascertain whether this represents  atelectasis related to mucoid impaction versus a neoplastic process. I  would suggest follow-up with an enhanced CT of the chest for further  assessment. There is also a nodular opacity within the right midlung  zone which could be infectious in nature but which could also be  assessed at the time of CT imaging. The right lung is otherwise clear.       Impression:       1.. Consolidation and volume loss within both the left upper and left  lower lobe with silhouetting of the left diaphragm, heart and left  mediastinum. It is difficult to ascertain whether this represents  mucoid  impaction with partial collapse of segments of the left lung versus a  neoplastic process. CT imaging with IV contrast is recommended.  2. Nodular opacity within the right midlung zone either related to  ongoing infiltrate versus a neoplastic process.  3. Endotracheal tube well-positioned.  This report was finalized on 01/15/2019 20:05 by Dr. Rudy Ahmadi MD.          ASSESSMENT:  CT scan of the chest showed large left pleural collection with collapse of the left lung.  Right lung has what appears to be patchy infiltrate.  Patient is morbidly obese with glucoses in the 400 range.  He might have been septic but certainly has severe pneumonia and large left pleural effusion/possible empyema.  He is on IV antibiotics.  No definitive cultures time aware.    I talked with the patient's wife.  He is quite ill.  He would not be an operative candidate at least at the present time.  We will try to place a left thoracostomy tube for drainage.  Hopefully it will be liquid enough that we can reexpand the lung.  However, he could have a empyema and entrapped lung.  He would be a poor operative candidate at the present time.  We do need a culture and antibiotic profile for correct antibiotic treatment.  Multiple other consults are ordered and in progress.    I talked with Mr. Carlin's wife.  We discussed thoracostomy tube placements indications options and possible complications.  She understands and wishes proceed          Plan:  Placement of left thoracostomy tube       Berlin Oglesby MD  01/16/19  6:16 AM

## 2019-01-16 NOTE — OP NOTE
Patient:Alan Carlin  Operation Date: 1/16/2019  Pre-op Diagnosis: Left pleural effusion    Post-op Diagnosis: Same    Operation : Placement of left thoracostomy tube with 1% lidocaine anesthetic    Surgeon: Dr Berlin Oglesby    Brief History: Mr. Alan Carlin is a 58-year-old morbidly obese  male who presents with respiratory failure and what appears to be pneumonia and apparent pneumonic effusion on the left.  He is presently on the ventilator and sedated.  I have been consulted by the hospitalist service for placement of left thoracostomy tube and drainage of pleural fluid.      Procedure: After an appropriate time out was performed, the patient's left chest was prepped and draped in a sterile fashion.  At the sixth interspace anterior axillary line the skin, subcutaneous tissue and chest wall musculature were infiltrated with plain lidocaine local anesthetic.  A small 1 cm incision was made in the skin and carried down to the pleural cavity by blunt dissection.  Upon entering the pleural space I digitally assessed the absence of adhesions and then placed a #28 thoracostomy tube in the pleural space.  This was connected to the Pleur-evac and the tube secured by suturing it into position.  There was no air leak and the immediate drainage was 600 ml of 0 sanguinous fluid.  The patient tolerated the procedure well without complication.  Stat portable chest x-ray is pending.    Estimated blood loss: Less than 10 mL  Pathology: We will send for culture and cytology    Berlin Oglesby MD  1/16/2019  7:51 AM

## 2019-01-17 NOTE — PROGRESS NOTES
"Patient: Alan Carlin  : 1960     Procedure:     Procedure Date:     POD: * No surgery found *      Subjective   Still intubated with propofol sedation.  He will awaken when propofol.  Follows commands.     Objective   Visit Vitals  /83   Pulse 103   Temp 98.5 °F (36.9 °C) (Axillary)   Resp 22   Ht 175.3 cm (69\")   Wt (!) 154 kg (340 lb 4.8 oz)   SpO2 95%   BMI 50.25 kg/m²       Intake/Output Summary (Last 24 hours) at 2019 0529  Last data filed at 2019 0400  Gross per 24 hour   Intake 4445.1 ml   Output 4890 ml   Net -444.9 ml     Pleur-evac at 1050 mL with no air leak                                   Lab Results:    CBC:   Results from last 7 days   Lab Units 19  0322 19  0306 01/15/19  2133   WBC 10*3/mm3 23.69* 32.37* 25.17*   HEMATOCRIT % 38.8* 38.1* 38.7*   PLATELETS 10*3/mm3 340 376 310     BMP:   Results from last 7 days   Lab Units 19  0322 19  0306 01/15/19  2133   SODIUM mmol/L 139 134* 133*   POTASSIUM mmol/L 5.6* 4.5 5.1   CHLORIDE mmol/L 99 91* 89*   CO2 mmol/L 30.0 30.0 31.0   GLUCOSE mg/dL 306* 414* 495*   BUN mg/dL 67* 67* 70*   CREATININE mg/dL 0.93 1.40 1.87*     Coag:   Results from last 7 days   Lab Units 01/15/19  2133   INR  1.68*   APTT seconds 39.2*       Images:  Imaging Results (last 24 hours)     Procedure Component Value Units Date/Time    XR Chest 1 View [[id number]] Updated:  19 0330    XR Abdomen KUB [[id number]] Collected:  19 1036     Updated:  19 1057    Narrative:       EXAMINATION:   XR ABDOMEN KUB-  2019 10:36 AM CST     HISTORY: NG tube placement     Single view the abdomen is obtained. Nasogastric tube is present with  the distal tip satisfactorily positioned in the body the stomach.       Impression:       Satisfactory placement nasogastric tube  This report was finalized on 2019 10:37 by Dr. Jerrod Nunez MD.    XR Chest 1 View [[id number]] Collected:  19     Updated:  19    " Narrative:       EXAMINATION:   XR CHEST 1 VW-  1/16/2019 9:37 AM CST     HISTORY: Chest tube placement     Frontal upright radiograph of the chest 1/16/2019 8:04 AM CST     COMPARISON: January 16, 2019 3:00 AM.     FINDINGS:   The right lung appears clear. Large left pleural complex is present.  Left chest tube is present left lung base.. The cardiac silhouettes  enlarged. Endotracheal tube is present..      The osseous structures and surrounding soft tissues demonstrate no acute  abnormality.       Impression:       1. Left chest tube is present projecting over the left lung base.  Moderate to large left pleural complex is present.  2. Endotracheal tube nasogastric tube are satisfactorily position.        This report was finalized on 01/16/2019 09:40 by Dr. Jerrod Nunez MD.    CT Chest Without Contrast [[id number]] Collected:  01/16/19 0725     Updated:  01/16/19 0735    Narrative:       EXAMINATION:   CT CHEST WO CONTRAST-  1/16/2019 7:25 AM CST     CT CHEST WO CONTRAST- 1/15/2019 10:47 PM CST     HISTORY: Pneumonia complicated / unresolved     COMPARISON: None     DOSE LENGTH PRODUCT: 1112 mGy cm. Automated exposure control was also  utilized to decrease patient radiation dose.     TECHNIQUE: Serial helical tomographic images of the chest were acquired.  Multiplanar reformatted images were provided for review.     FINDINGS:  The imaged portion of the neck and thyroid gland is unremarkable.      Infiltrates present in the right lower lobe. Opacification of the left  hemithorax. There is consolidation of the left lung and a moderate to  large left pleural complex. Only a small segment of the left upper lobe  is aerated.. The left pleural complex is large pleural complex. Is may  be causing compression atelectasis of the consolidated left lung.  Tracheal tube is present. Nasogastric tube is satisfactorily position..     The heart, great vessels, and pulmonary vessels are normal in appearance  within limits of a  noncontrast study. There is no pericardial effusion.  No enlarged axillary or mediastinal lymph nodes are present.      No acute findings are seen in the bones or surrounding soft tissues.     Calculi are noted in the gallbladder.       Impression:       1. Large left pleural complex. There is consolidation of the left lung.  Only a small segment of the left upper lobe is aerated.  2. Small infiltrate right lower lobe        This report was finalized on 01/16/2019 07:32 by Dr. Jerrod Nunez MD.    XR Chest 1 View [[id number]] Collected:  01/16/19 0700     Updated:  01/16/19 0705    Narrative:       History:  58-year-old to confirm endotracheal tube placement.     Reference:  CT chest 1 day prior.     Findings:  Frontal chest radiograph performed.     There remains consolidation in the right lower lobe. Large left pleural  effusion compressing the majority the left lung. Alternatively this  could be extensive left lung pneumonia with parapneumonic fluid.  Atherosclerotic calcifications. No pneumothorax. Enteric tube extends  below diaphragm, tip not imaged. Endotracheal tube is in good position  at the level of the sternoclavicular joints.          Impression:       Satisfactory endotracheal tube.  This report was finalized on 01/16/2019 07:02 by Dr Jean-Pierre Solano, .        Images Today: Continues almost white out of the left lung.  Not a great change even after removing a liter with the Pleur-evac.  The thoracostomy tubes in place.      Physical Exam:   Sedated on propofol  Chest: Rales and wheezes throughout both sides but markedly decreased breath sounds on the left.  Heart: Regular rate and rhythm (normal sinus rhythm on monitor)    Impression: Culture result is not back but large amount of white cells in the pleural fluid.  Almost certainly an empyema.  The fluid is loculated and not completely drained with chest tube.  In fact, it is not draining very well at all.  He has put out 1000 mL in the chest tube  however.   Still do not think he would be a great operative candidate secondary to his present condition.  We will start alteplase today to try to mobilize the loculations.    Plan: As above    Berlin Oglesby MD  01/17/19  5:29 AM

## 2019-01-17 NOTE — PROGRESS NOTES
[facility] Medicine Services  INPATIENT PROGRESS NOTE    Patient Name: Alan Carlin  Date of Admission: 1/15/2019  Today's Date: 01/17/19  Length of Stay: 2  Primary Care Physician: Francy Dumont MD    Subjective   Chief Complaint: shortness of breath  HPI     Patient seen and examined at bedside.  Patient is stable.  Ventilator still on high-PEEP, and 60% FIO2.  Static and dynamic parameters were evaluated and stable.          Review of Systems   Unable to perform ROS: Intubated      All pertinent negatives and positives are as above. All other systems have been reviewed and are negative unless otherwise stated.     Objective    Temp:  [98.5 °F (36.9 °C)-99.6 °F (37.6 °C)] 99.1 °F (37.3 °C)  Heart Rate:  [] 102  Resp:  [18-26] 18  BP: ()/(50-94) 125/87  Arterial Line BP: ()/(43-81) 137/78  FiO2 (%):  [60 %-70 %] 60 %  Physical Exam  Constitutional:  He appears ill. No distress. He is intubated.   HENT:   Head: Normocephalic and atraumatic.   Mouth/Throat: Oropharynx is clear and moist and mucous membranes are normal. Mucous membranes are not dry.   Endotracheal tube present; orogastric tube in place   Eyes: Conjunctivae are normal. No scleral icterus.   Neck: No JVD present. Carotid bruit is not present.   Cardiovascular: Regular rhythm, normal heart sounds, intact distal pulses and normal pulses. Tachycardia present.   Pulmonary/Chest: He is intubated. No respiratory distress.   Left sided lateral chest tube in place on suction        Abdominal: Soft. Normal appearance and bowel sounds are normal. He exhibits no distension. There is no tenderness.   Genitourinary:         Neurological:   Sedated    Skin: Skin is warm and dry. He is not diaphoretic.   Psychiatric:   Sedated   Nursing note and vitals reviewed.             Results Review:  I have reviewed the labs, radiology results, and diagnostic studies.    Laboratory Data:   Results from last 7 days   Lab  Units 01/17/19  0322 01/16/19  0306 01/15/19  2133   WBC 10*3/mm3 23.69* 32.37* 25.17*   HEMOGLOBIN g/dL 12.2* 12.4* 12.2*   HEMATOCRIT % 38.8* 38.1* 38.7*   PLATELETS 10*3/mm3 340 376 310        Results from last 7 days   Lab Units 01/17/19  0322 01/16/19  0306 01/15/19  2133   SODIUM mmol/L 139 134* 133*   POTASSIUM mmol/L 5.6* 4.5 5.1   CHLORIDE mmol/L 99 91* 89*   CO2 mmol/L 30.0 30.0 31.0   BUN mg/dL 67* 67* 70*   CREATININE mg/dL 0.93 1.40 1.87*   CALCIUM mg/dL 8.5 9.1 8.8   BILIRUBIN mg/dL 0.5 0.7 0.5   ALK PHOS U/L 105 116 115   ALT (SGPT) U/L 28 27 24   AST (SGOT) U/L 31 52* 35   GLUCOSE mg/dL 306* 414* 495*       Culture Data:   [unfilled]    Radiology Data:   Imaging Results (last 24 hours)     Procedure Component Value Units Date/Time    XR Chest 1 View [[id number]] Collected:  01/17/19 0724     Updated:  01/17/19 0729    Narrative:       History:  58-year-old with intubation.     Reference:  Chest radiograph one day prior.     Findings:  Frontal chest radiograph performed.     Cardiomegaly. Large left pleural effusion with loculated component.  Majority the left lung is consolidated/compressed.: Minimal aeration of  the left upper lobe. There remains consolidation in the superior segment  right lower lobe. No right pleural effusion. No pneumothorax.     Well-positioned endotracheal tube. NG tube is not well seen distally,  technical related.          Impression:       No interval improvement.  This report was finalized on 01/17/2019 07:26 by Dr Jean-Pierre Solano, .    XR Abdomen KUB [[id number]] Collected:  01/16/19 1036     Updated:  01/16/19 1057    Narrative:       EXAMINATION:   XR ABDOMEN KUB-  1/16/2019 10:36 AM CST     HISTORY: NG tube placement     Single view the abdomen is obtained. Nasogastric tube is present with  the distal tip satisfactorily positioned in the body the stomach.       Impression:       Satisfactory placement nasogastric tube  This report was finalized on 01/16/2019 10:37 by   Jerrod Nunez MD.    XR Chest 1 View [[id number]] Collected:  01/16/19 0937     Updated:  01/16/19 0943    Narrative:       EXAMINATION:   XR CHEST 1 VW-  1/16/2019 9:37 AM CST     HISTORY: Chest tube placement     Frontal upright radiograph of the chest 1/16/2019 8:04 AM CST     COMPARISON: January 16, 2019 3:00 AM.     FINDINGS:   The right lung appears clear. Large left pleural complex is present.  Left chest tube is present left lung base.. The cardiac silhouettes  enlarged. Endotracheal tube is present..      The osseous structures and surrounding soft tissues demonstrate no acute  abnormality.       Impression:       1. Left chest tube is present projecting over the left lung base.  Moderate to large left pleural complex is present.  2. Endotracheal tube nasogastric tube are satisfactorily position.        This report was finalized on 01/16/2019 09:40 by Dr. Jerrod Nunez MD.          I have reviewed the patient's current medications.     Assessment/Plan     Active Hospital Problems    Diagnosis   • PNA (pneumonia)   • Acute respiratory failure with hypercapnia (CMS/HCC)   • Controlled type 2 diabetes mellitus without complication, with long-term current use of insulin (CMS/HCC)       Assessment:  1) Acute hypoxic and acute on chronic hypercapnia respiratory failure due to left-sided pleural effusion and right lower lobe community-acquired pneumonia complicated by leukocytosis  2) Septic shock due to community-acquired pneumonia  3) Acute kidney injury, improved  4) Morbid obesity   5) Normocytic anemia  6) Suppressed TSH, normal Free T4  7) Type 2 DM complicated by hyperglycemia  8) Hyperkalemia     Plan:  1) CT surgery consult, appreciate assistance.  Chest tube placed.  Alteplase today.    2) BC x 2 NGTD  3) Respiratory culture NGTD  4) CT to suction  5) Continue levophed for MAP >65  6) Continue cefepime, levofloxacin, vancomycin   7) GI Famotidine, DVT Enoxaparin   8) Duonebs q4h scheduled  9) Strep and  legionella antigen negative  10) Fluid culture pending; WBC very elevated  11) Kayexalate and miralax  12) Furosemide 40 mg IV x 1                   Discharge Planning: I expect the patient to be discharged to ? in ? days    Aniceto Ott MD   01/17/19   8:37 AM

## 2019-01-17 NOTE — PLAN OF CARE
Problem: Patient Care Overview  Goal: Plan of Care Review  Outcome: Ongoing (interventions implemented as appropriate)   01/16/19 7461   OTHER   Outcome Summary pt sedated on vent, follows commands, art line placed, chest tube placed, insulin gtt started and stopped, levo gtt, will continue to monitor   Coping/Psychosocial   Plan of Care Reviewed With patient;spouse;daughter   Plan of Care Review   Progress improving

## 2019-01-17 NOTE — PROGRESS NOTES
PULMONARY AND CRITICAL CARE PROGRESS NOTE - [facility]    Patient: Alan Carlin    1960    MR# [id number]    [id number]  01/17/19   10:02 AM  Referring Provider: Aniceto Ott MD    Chief Complaint: Mechanically ventilated    Interval history: He remains intubated and sedated today.  PEEP remains at 10 and FiO2 60%.  Chest tube with minimal output overnight.  Wife at the bedside indicates they plan to put some medication in his tube today to help it drain per her understanding of the discussion.  She indicates the surgeon felt that he was too sick to go to the operating room.  Saturation is 92%.  Respirations in the 20s.  He no longer has an arterial line.  Central line remains in the groin.     Meds:    custom medication builder 50 mL Intrapleural BID   cefepime 2 g Intravenous Q8H   enoxaparin 40 mg Subcutaneous Q24H   famotidine 20 mg Intravenous BID   guaiFENesin 400 mg Oral Q8H   insulin lispro 0-9 Units Subcutaneous 4x Daily With Meals & Nightly   ipratropium-albuterol 3 mL Nebulization Q4H - RT   levoFLOXacin 750 mg Intravenous Q24H   Morphine 2 mg Intravenous Once   polyethylene glycol 17 g Oral Once   sodium chloride 3 mL Intravenous Q12H   sodium polystyrene 15 g Oral Once   vancomycin 1,250 mg Intravenous Q12H       norepinephrine 0.02-0.3 mcg/kg/min Last Rate: 0.06 mcg/kg/min (01/17/19 0510)   propofol 5-50 mcg/kg/min Last Rate: 50 mcg/kg/min (01/17/19 0831)   sodium chloride 50 mL/hr Last Rate: 50 mL/hr (01/17/19 0459)     Review of Systems:     Cannot obtain due to mechanical ventilation.  The patient notably is critically ill and connected to a ventilator.  As such patient cannot communicate and provide any history whatsoever, including any history of present illness or interval history since arrival or review of systems. The interested reviewer may note this fact, as an attempt has been made at collecting and documenting these portions of the patient history, but  this information is unobtainable despite attempted review and therefore cannot be documented at this time.     Ventilator Settings:     Vt (Set, L): 0.6 L  Resp Rate (Set): 18  Pressure Support (cm H2O): 0 cm H20  FiO2 (%): 60 %  PEEP/CPAP (cm H2O): 10 cm H20  Minute Ventilation (L/min) (Obs): 14.4 L/min  Resp Rate (Observed) Vent: 22  I:E Ratio (Set): 1:2.00  I:E Ratio (Obs): 1:1.3  PIP Observed (cm H2O): 34 cm H2O     Physical Exam:  Temp:  [98.5 °F (36.9 °C)-99.6 °F (37.6 °C)] 99.1 °F (37.3 °C)  Heart Rate:  [] 103  Resp:  [18-26] 18  BP: ()/(57-94) 121/71  Arterial Line BP: ()/(43-81) 137/78  FiO2 (%):  [60 %-70 %] 60 %    Intake/Output Summary (Last 24 hours) at 1/17/2019 1002  Last data filed at 1/17/2019 0715  Gross per 24 hour   Intake 3764.1 ml   Output 3590 ml   Net 174.1 ml     SpO2 Percentage    01/17/19 0830 01/17/19 0930 01/17/19 0945   SpO2: 93% 93% 92%      Physical Exam:    Constitutional: He appears well-developed and well-nourished. No distress. Sedated and intubated  HENT:   Head: Normocephalic and atraumatic.   Right Ear: External ear normal.   Left Ear: External ear normal.   Nose: Nose normal.   Eyes: Pupils are equal, round, and reactive to light. Right eye exhibits no discharge. Left eye exhibits no discharge.   Neck: Normal range of motion. Neck supple.   thick   Cardiovascular: Normal rate and regular rhythm.   No murmur heard.  Pulmonary/Chest: He has decreased breath sounds in the left upper field and the left lower field.   Chest tube to left lateral chest, secured, attached to Pleur-evac and suction   Abdominal: Soft. Bowel sounds are normal. He exhibits distension.   Musculoskeletal: He exhibits edema. He exhibits no deformity.   Neurological:   Intubated and sedated    Skin: Skin is warm and dry. No rash noted. He is not diaphoretic. No erythema. Central line to groin  Psychiatric: He has a normal mood and affect. His behavior is normal. Thought content normal.    Nursing note and vitals reviewed.        Results from last 7 days   Lab Units 01/17/19  0322 01/16/19  0306 01/15/19  2133   WBC 10*3/mm3 23.69* 32.37* 25.17*   HEMOGLOBIN g/dL 12.2* 12.4* 12.2*   PLATELETS 10*3/mm3 340 376 310     Results from last 7 days   Lab Units 01/17/19  0835 01/17/19  0322 01/16/19  0306 01/15/19  2133   SODIUM mmol/L  --  139 134* 133*   POTASSIUM mmol/L 5.4* 5.6* 4.5 5.1   BUN mg/dL  --  67* 67* 70*   CREATININE mg/dL  --  0.93 1.40 1.87*     Results from last 7 days   Lab Units 01/17/19  0428 01/16/19  1534 01/16/19  1019   PH, ARTERIAL pH units 7.402 7.384 7.388   PCO2, ARTERIAL mm Hg 48.1* 51.7* 51.0*   PO2 ART mm Hg 85.8 70.8* 75.2*   FIO2 % 70 70 70     Blood Culture   Date Value Ref Range Status   01/16/2019 No growth at 24 hours  Preliminary   01/16/2019 No growth at 24 hours  Preliminary     Respiratory Culture   Date Value Ref Range Status   01/15/2019 Rare Normal Respiratory Ana Lilia  Final   01/15/2019 Rare Candida albicans (A)  Final     Recent films:  Ct Chest Without Contrast    Result Date: 1/16/2019  1. Large left pleural complex. There is consolidation of the left lung. Only a small segment of the left upper lobe is aerated. 2. Small infiltrate right lower lobe   This report was finalized on 01/16/2019 07:32 by Dr. Jerrod Nunez MD.    Xr Chest 1 View    Result Date: 1/17/2019  No interval improvement. This report was finalized on 01/17/2019 07:26 by Dr Jean-Pierre Solano, .    Xr Chest 1 View    Result Date: 1/16/2019  1. Left chest tube is present projecting over the left lung base. Moderate to large left pleural complex is present. 2. Endotracheal tube nasogastric tube are satisfactorily position.   This report was finalized on 01/16/2019 09:40 by Dr. Jerrod Nunez MD.    Xr Chest 1 View    Result Date: 1/16/2019  Satisfactory endotracheal tube. This report was finalized on 01/16/2019 07:02 by Dr Jean-Pierre Solano, .    Xr Chest 1 View    Result Date: 1/15/2019  1.. Consolidation and  volume loss within both the left upper and left lower lobe with silhouetting of the left diaphragm, heart and left mediastinum. It is difficult to ascertain whether this represents mucoid impaction with partial collapse of segments of the left lung versus a neoplastic process. CT imaging with IV contrast is recommended. 2. Nodular opacity within the right midlung zone either related to ongoing infiltrate versus a neoplastic process. 3. Endotracheal tube well-positioned. This report was finalized on 01/15/2019 20:05 by Dr. Rudy Ahmadi MD.    Xr Abdomen Kub    Result Date: 1/16/2019  Satisfactory placement nasogastric tube This report was finalized on 01/16/2019 10:37 by Dr. Jerrod Nunez MD.    Films reviewed personally by me.  My interpretation: Endotracheal tube in good position, small infiltrate in the right lung, chest tube to the left side with poor aeration of the left upper lobe, persistent complex effusion in the left lower lobe    Pulmonary Assessment:    1. Acute hypoxemic respiratory fair requiring mechanical ventilation  2. Empyema, left  3. Type II diabetes  4. Thyroid disease  5. Acute kidney injury  6. Hypotension requiring pressors  7. Sepsis    Recommend:     · Adequate sedation and pain control for ventilated patient  · Continue nebs scheduled  · Chest x-ray and ABG daily while on the ventilator  · Stress ulcer and DVT prophylaxis  · No vent changes   · Broad-spectrum antibiotic coverage  · Awaiting pleural fluid results however lots of WBCs in cell count  · Plans for TPA today in the CT per CT surgery  · Wife updated at the bedside.    Electronically signed by LUIS E Pope on 1/17/2019 at 10:02 AM    Patient has no changes.  Chest x-ray still shows significant opacity and probable loculated fluid for which Dr. Oglesby plans intrapleural thrombolytic therapy.  He is sedated and unable to provide any additional history.  Breath sounds are diminished.  ABGs are adequate this a.m. and  hyponatremia is improved.  No changes on the vent for now, not ready to liberate or begin spontaneous breathing trials.  Continue antibiotics.    I have seen and examined patient personally, performing a face-to-face diagnostic evaluation with plan of care reviewed and developed with APRN and nursing staff. I have addended and/or modified the above history of present illness, physical examination, and assessment and plan to reflect my findings and impressions. Essential elements of the care plan were discussed with APRN above.  Agree with findings and assessment/plan as documented above.    Electronically signed by Damion Wright MD, on 1/17/2019, 11:26 AM    EMR Dragon/Transcription disclaimer: Much of this encounter note is an electronic transcription/translation of spoken language to printed text. The electronic translation of spoken language may permit erroneous, or at times, nonsensical words or phrases to be inadvertently transcribed; although I have reviewed the note for such errors, some may still exist.

## 2019-01-17 NOTE — PROGRESS NOTES
"Pharmacy Dosing Service  Pharmacokinetics  Vancomycin Follow-up Evaluation    Assessment/Action/Plan:  Vancomycin trough ordered before dose at 1400 today.  Continue Vancomycin 1250mg iv q12h.  Pharmacy will continue to monitor renal function and adjust dose accordingly.     Subjective:  Alan Carlin is a 58 y.o. male currently on Vancomycin 1250 mg IV every 12 hours for the treatment of pneumonia, day 2 of therapy.    Objective:  Ht: 175.3 cm (69\"); Wt: (!) 154 kg (340 lb 4.8 oz)  Estimated Creatinine Clearance: 125.8 mL/min (by C-G formula based on SCr of 0.93 mg/dL).   Lab Results   Component Value Date    CREATININE 0.93 01/17/2019    CREATININE 1.40 01/16/2019    CREATININE 1.87 (H) 01/15/2019      Lab Results   Component Value Date    WBC 23.69 (H) 01/17/2019    WBC 32.37 (C) 01/16/2019    WBC 25.17 (H) 01/15/2019       No results found for: VANCOPEAK, VANCOTROUGH, VANCORANDOM    Culture Results:  Microbiology Results (last 10 days)       Procedure Component Value - Date/Time    Legionella Antigen, Urine - Urine, Urine, Catheter [[id number]]  (Normal) Collected:  01/16/19 0046    Lab Status:  Final result Specimen:  Urine, Catheter Updated:  01/16/19 0209     LEGIONELLA ANTIGEN, URINE Negative    S. Pneumo Ag Urine or CSF - Urine, Urine, Catheter [[id number]]  (Normal) Collected:  01/16/19 0046    Lab Status:  Final result Specimen:  Urine, Catheter Updated:  01/16/19 0209     Strep Pneumo Ag Negative    Blood Culture - Blood, Hand, Right [[id number]] Collected:  01/16/19 0036    Lab Status:  Preliminary result Specimen:  Blood from Hand, Right Updated:  01/17/19 0045     Blood Culture No growth at 24 hours    Blood Culture - Blood, Arm, Left [[id number]] Collected:  01/16/19 0036    Lab Status:  Preliminary result Specimen:  Blood from Arm, Left Updated:  01/17/19 0045     Blood Culture No growth at 24 hours    Respiratory Culture - Sputum, ET Suction [[id number]]  (Abnormal) Collected:  01/15/19 2009    Lab " Status:  Final result Specimen:  Sputum from ET Suction Updated:  01/17/19 0754     Respiratory Culture Rare Normal Respiratory Nellie      Rare Radha albicans     Gram Stain Greater than 25 WBCs per low power field      Rare (1+) Epithelial cells per low power field      Rare (1+) Mixed gram positive nellie            Dima Lagunas, [facility]   01/17/19 8:04 AM

## 2019-01-17 NOTE — PROGRESS NOTES
"Pharmacy Dosing Service  Pharmacokinetics  Vancomycin Follow-up Evaluation     Assessment/Action/Plan:  1/17 1253 Vancomycin trough = 13.63 (10.75  hours post 1250mg dose). Continue Vancomycin 1250mg iv q12h.  Pharmacy will continue to monitor renal function and adjust dose accordingly.      Subjective:  Alan Carlin is a 58 y.o. male currently on Vancomycin 1250 mg IV every 12 hours for the treatment of pneumonia, day 2 of therapy.    Objective:  Ht: 175.3 cm (69\"); Wt: (!) 154 kg (340 lb 4.8 oz)  Estimated Creatinine Clearance: 125.8 mL/min (by C-G formula based on SCr of 0.93 mg/dL).   Lab Results   Component Value Date    CREATININE 0.93 01/17/2019    CREATININE 1.40 01/16/2019    CREATININE 1.87 (H) 01/15/2019      Lab Results   Component Value Date    WBC 23.69 (H) 01/17/2019    WBC 32.37 (C) 01/16/2019    WBC 25.17 (H) 01/15/2019         Lab Results   Component Value Date    VANCOTROUGH 13.63 01/17/2019       Culture Results:  Microbiology Results (last 10 days)       Procedure Component Value - Date/Time    Body Fluid Culture - Body Fluid, Pleural Cavity [[id number]] Collected:  01/16/19 0937    Lab Status:  Preliminary result Specimen:  Body Fluid from Pleural Cavity Updated:  01/17/19 1420     BF Culture No growth at 24 hours     Gram Stain Many (4+) WBCs seen      No organisms seen    Anaerobic Culture - Pleural Fluid, Pleural Cavity [[id number]] Collected:  01/16/19 0929    Lab Status:  Preliminary result Specimen:  Pleural Fluid from Pleural Cavity Updated:  01/17/19 1139     Culture No anaerobes isolated at 24 hours    Legionella Antigen, Urine - Urine, Urine, Catheter [[id number]]  (Normal) Collected:  01/16/19 0046    Lab Status:  Final result Specimen:  Urine, Catheter Updated:  01/16/19 0209     LEGIONELLA ANTIGEN, URINE Negative    S. Pneumo Ag Urine or CSF - Urine, Urine, Catheter [[id number]]  (Normal) Collected:  01/16/19 0046    Lab Status:  Final result Specimen:  Urine, Catheter Updated:  " 01/16/19 0209     Strep Pneumo Ag Negative    Blood Culture - Blood, Hand, Right [[id number]] Collected:  01/16/19 0036    Lab Status:  Preliminary result Specimen:  Blood from Hand, Right Updated:  01/17/19 0045     Blood Culture No growth at 24 hours    Blood Culture - Blood, Arm, Left [[id number]] Collected:  01/16/19 0036    Lab Status:  Preliminary result Specimen:  Blood from Arm, Left Updated:  01/17/19 0045     Blood Culture No growth at 24 hours    Respiratory Culture - Sputum, ET Suction [[id number]]  (Abnormal) Collected:  01/15/19 2009    Lab Status:  Final result Specimen:  Sputum from ET Suction Updated:  01/17/19 0754     Respiratory Culture Rare Normal Respiratory Nellie      Rare Radha albicans     Gram Stain Greater than 25 WBCs per low power field      Rare (1+) Epithelial cells per low power field      Rare (1+) Mixed gram positive nellie            Dima Lagunas, [facility]   01/17/19 5:12 PM

## 2019-01-18 NOTE — PROGRESS NOTES
"Patient: Alan Carlin  : 1960     Procedure:     Procedure Date:     POD: * No surgery found *      Subjective   Still intubated.  Hemodynamically fairly stable.  We have begun instillation of enzymes per thoracostomy tube.     Objective   Visit Vitals  /63 (BP Location: Right arm, Patient Position: Lying)   Pulse 102   Temp 98.7 °F (37.1 °C) (Oral)   Resp 18   Ht 175.3 cm (69\")   Wt (!) 154 kg (340 lb 4.8 oz)   SpO2 92%   BMI 50.25 kg/m²       Intake/Output Summary (Last 24 hours) at 2019 0530  Last data filed at 2019 0400  Gross per 24 hour   Intake 3906.58 ml   Output 5480 ml   Net -1573.42 ml                                        Lab Results:    CBC:   Results from last 7 days   Lab Units 19  0418 19  0322 19  0306   WBC 10*3/mm3 16.59* 23.69* 32.37*   HEMATOCRIT % 37.1* 38.8* 38.1*   PLATELETS 10*3/mm3 282 340 376     BMP:   Results from last 7 days   Lab Units 19  0418 19  1747 19  0835 19  0322   SODIUM mmol/L 140 137  --  139   POTASSIUM mmol/L 5.4* 5.0 5.4* 5.6*   CHLORIDE mmol/L 102 98  --  99   CO2 mmol/L 31.0 33.0*  --  30.0   GLUCOSE mg/dL 348* 363*  --  306*   BUN mg/dL 72* 63*  --  67*   CREATININE mg/dL 0.96 0.99  --  0.93     Coag:   Results from last 7 days   Lab Units 01/15/19  2133   INR  1.68*   APTT seconds 39.2*       Images:  Imaging Results (last 24 hours)     Procedure Component Value Units Date/Time    XR Chest 1 View [[id number]] Updated:  19 0435    XR Chest 1 View [[id number]] Collected:  19     Updated:  19    Narrative:       History:  58-year-old with intubation.     Reference:  Chest radiograph one day prior.     Findings:  Frontal chest radiograph performed.     Cardiomegaly. Large left pleural effusion with loculated component.  Majority the left lung is consolidated/compressed.: Minimal aeration of  the left upper lobe. There remains consolidation in the superior segment  right lower " lobe. No right pleural effusion. No pneumothorax.     Well-positioned endotracheal tube. NG tube is not well seen distally,  technical related.          Impression:       No interval improvement.  This report was finalized on 01/17/2019 07:26 by Dr Jean-Pierre Solano, .        Images Today: Chest x-ray today shows a little more clearing on the left after installation of alteplase and Pulmozyme     Physical Exam:   Gen.: He is sedated on the ventilator.  (Will awaken and follow commands when not sedated)  Chest: Rales and wheezes throughout.  Fairly good breath sounds on the right markedly decreased on the left  Heart: Regular rate and rhythm    Impression: Hemodynamically stable.  Still on ventilator.  Installation of alteplase and Pulmozyme seems to be at least partially working.  Chest x-ray looks some better.  White count is decreasing.    Plan: Continue present treatment of installation of Pulmozyme and alteplase    Berlin Oglesby MD  01/18/19  5:30 AM

## 2019-01-18 NOTE — THERAPY EVALUATION
Acute Care - Physical Therapy Initial Evaluation  [facility]     Patient Name: Alan Carlin  : 1960  MRN: [id number]  Today's Date: 2019   Onset of Illness/Injury or Date of Surgery: 01/15/19  Date of Referral to PT: 19  Referring Physician: Dr. Ott([facility])      Admit Date: 1/15/2019    Visit Dx:     ICD-10-CM ICD-9-CM   1. Shock, septic (CMS/HCC) A41.9 038.9    R65.21 785.52     995.92   2. Impaired mobility Z74.09 799.89     Patient Active Problem List   Diagnosis   • Controlled type 2 diabetes mellitus without complication, with long-term current use of insulin (CMS/HCC)   • Hyperlipidemia   • Vitamin D deficiency   • Essential hypertension   • Upper respiratory tract infection   • PNA (pneumonia)   • Acute respiratory failure with hypercapnia (CMS/HCC)     Past Medical History:   Diagnosis Date   • Dyslipidemia    • Elevated blood pressure    • Essential hypertension    • Type II diabetes mellitus, uncontrolled (CMS/HCC)      History reviewed. No pertinent surgical history.     PT ASSESSMENT (last 12 hours)      Physical Therapy Evaluation     Row Name 19 1100          PT Evaluation Time/Intention    Document Type  evaluation  -ROMULO     Mode of Treatment  physical therapy  -ROMULO     Row Name 19 1100          General Information    Patient Profile Reviewed?  yes  -ROMULO     Onset of Illness/Injury or Date of Surgery  01/15/19  -ROMULO     Referring Physician  Dr. Ott ROM  -ROMULO     Patient Observations  unable to respond on vent, sedation on  -ROMULO     Patient/Family Observations  family x 2 in room  -ROMULO     General Observations of Patient  Fowlers in bed, on vent  -ROMULO     Prior Level of Function  independent:;all household mobility;community mobility;ADL's;dressing;bathing;work  -ROMULO     Pertinent History of Current Functional Problem  SOB, cough, weakness. Dx: Community-acquired left upper & left lower lung PNA, possible mucus plugging. Septic shock, Acute resp. failure with hypoxia &  hypercapnia requiring intubation. s/p 1/16 placement of left thoracostomy tube, altepase and pulmozyme per CT surgery.   -ROMULO     Existing Precautions/Restrictions  fall;oxygen therapy device and L/min vent, femoral line, chest tube  -ROMULO     Risks Reviewed  patient:;LOB;nausea/vomiting;dizziness;increased discomfort;change in vital signs;increased drainage;lines disloged;family:  -ROMULO     Benefits Reviewed  patient:;family:;improve function;increase independence;increase strength;increase balance;decrease pain;increase knowledge;improve skin integrity  -ROMULO     Barriers to Rehab  medically complex  -ROMULO     Row Name 01/18/19 1100          Cognitive Assessment/Interventions    Additional Documentation  Cognitive Assessment/Intervention (Group)  -ROMULO     Row Name 01/18/19 1100          Cognitive Assessment/Intervention- PT/OT    Orientation Status (Cognition)  unable/difficult to assess  -ROMULO     Follows Commands (Cognition)  does not follow one step commands;unable/difficult to assess on vent, sedation on  -ROMULO     Personal Safety Interventions  fall prevention program maintained;muscle strengthening facilitated;nonskid shoes/slippers when out of bed  -ROMULO     Row Name 01/18/19 1100          Safety Issues, Functional Mobility    Impairments Affecting Function (Mobility)  strength;range of motion (ROM)  -ROMULO     Row Name 01/18/19 1100          Bed Mobility Assessment/Treatment    Bed Mobility Assessment/Treatment  rolling left;rolling right;scooting/bridging  -ROMULO     Rolling Left [place] (Bed Mobility)  dependent (less than 25% patient effort);2 person assist  -ROMULO     Rolling Right [place] (Bed Mobility)  dependent (less than 25% patient effort);2 person assist  -ROMULO     Scooting/Bridging [place] (Bed Mobility)  dependent (less than 25% patient effort);2 person assist  -ROMULO     Assistive Device (Bed Mobility)  draw sheet  -ROMULO     Comment (Bed Mobility)  assisted RN with scooting and rolling L and R  -ROMULO     Row  Name 01/18/19 1100          General ROM    GENERAL ROM COMMENTS  B UE/LE PROM WFL  -ROMULO     Row Name 01/18/19 1100          MMT (Manual Muscle Testing)    General MMT Comments  unable to accurately assess, sedated on the vent  -ROMULO     Row Name 01/18/19 1100          Motor Assessment/Intervention    Additional Documentation  Therapeutic Exercise Interventions (Group)  -ROMULO     Row Name 01/18/19 1100          Therapeutic Exercise    Upper Extremity Range of Motion (Therapeutic Exercise)  wrist flexion/extension, bilateral;elbow flexion/extension, bilateral;shoulder internal/external rotation, bilateral;shoulder horizontal abduction/adduction, bilateral;shoulder flexion/extension, bilateral  -ROMULO     Lower Extremity Range of Motion (Therapeutic Exercise)  ankle dorsiflexion/plantar flexion, bilateral;knee flexion/extension, bilateral;hip abduction/adduction, bilateral  -ROMULO     Exercise Type (Therapeutic Exercise)  PROM (passive range of motion)  -ROMULO     Position (Therapeutic Exercise)  supine  -ROMULO     Sets/Reps (Therapeutic Exercise)  10 reps  -ROMULO     Expected Outcome (Therapeutic Exercise)  facilitate normal movement patterns  -ROMULO     Row Name 01/18/19 1100          Pain Assessment    Additional Documentation  Pain Scale: FACES Pre/Post-Treatment (Group)  -ROMULO     Linda Name 01/18/19 1100          Pain Scale: FACES Pre/Post-Treatment    Pain: FACES Scale, Pretreatment  0-->no hurt  -ROMULO     Pain: FACES Scale, Post-Treatment  0-->no hurt  -ROMULO     Row Name 01/18/19 1100          Health Promotion    Additional Documentation  Coping (Group);Plan of Care Review (Group)  -ROMULO     [facility] Name 01/18/19 1100          Coping    Observed Emotional State  calm  -ROMULO     [facility] Name 01/18/19 1100          Plan of Care Review    Plan of Care Reviewed With  patient;family  -ROMULO     [facility] Name 01/18/19 1100          Physical Therapy Clinical Impression    Date of Referral to PT  01/18/19  -ROMULO     Patient/Family Goals Statement (PT Clinical Impression)   Increase strength/mobility  -ROMULO     Criteria for Skilled Interventions Met (PT Clinical Impression)  yes;treatment indicated  -ROMULO     Impairments Found (describe specific impairments)  gait, locomotion, and balance  -ROMULO     Rehab Potential (PT Clinical Summary)  good, to achieve stated therapy goals  -ROMULO     Predicted Duration of Therapy (PT)  until d/c  -ROMULO     Care Plan Review (PT)  evaluation/treatment results reviewed;risks/benefits reviewed;current/potential barriers reviewed;patient/other agree to care plan  -ROMULO     Care Plan Review, Other Participant (PT Clinical Impression)  family  -ROMULO     Row Name 01/18/19 1100          Vital Signs    Post Systolic BP Rehab  109  -ROMULO     Post Treatment Diastolic BP  75  -ROMULO     Posttreatment Heart Rate (beats/min)  99  -ROMULO     Post SpO2 (%)  92  -ROMULO     O2 Delivery Post Treatment  -- vent  -ROMULO     Pre Patient Position  Supine  -ROMULO     Intra Patient Position  Supine  -ROMULO     Post Patient Position  Supine  -ROMULO     Row Name 01/18/19 1100          Physical Therapy Goals    Bed Mobility Goal Selection (PT)  bed mobility, PT goal 1  -ROMULO     ROM Goal Selection (PT)  ROM, PT goal 1  -ROMULO     Additional Documentation  ROM Goal Selection (PT) (Row)  -ROMULO     Row Name 01/18/19 1100          Bed Mobility Goal 1 (PT)    Activity/Assistive Device (Bed Mobility Goal 1, PT)  rolling to left;rolling to right  -ROMULO     [place] Level/Cues Needed (Bed Mobility Goal 1, PT)  moderate assist (50-74% patient effort) assist x 2  -ROMULO     Time Frame (Bed Mobility Goal 1, PT)  long term goal (LTG);10 days  -ROMULO     Progress/Outcomes (Bed Mobility Goal 1, PT)  goal ongoing  -ROMULO     Row Name 01/18/19 1100          ROM Goal 1 (PT)    ROM Goal 1 (PT)  AAROM/AROM B UE/LE x 20 reps, min assist  -ROMULO     Time Frame (ROM Goal 1, PT)  long term goal (LTG)  -ROMULO     Progress/Outcome (ROM Goal 1, PT)  goal ongoing  -ROMULO     Row Name 01/18/19 1100          Patient Education Goal (PT)    Activity (Patient  Education Goal, PT)  No new evidence of skin breakdown or contractures while on the vent  -ROMULO     [place]/Cues/Accuracy (Memory Goal 2, PT)  demonstrates adequately  -ROMULO     Time Frame (Patient Education Goal, PT)  long term goal (LTG);10 days  -ROMULO     Progress/Outcome (Patient Education Goal, PT)  goal ongoing  -ROMULO     Row Name 01/18/19 1100          Positioning and Restraints    Pre-Treatment Position  in bed  -ROMULO     Post Treatment Position  bed  -ROMULO     In Bed  fowlers;side lying right;call light within reach;encouraged to call for assist;patient within staff view;with family/caregiver  -ROMULO       User Key  (r) = Recorded By, (t) = Taken By, (c) = Cosigned By    Initials Name Provider Type    Juan Ramon Sarabia, PT DPT Physical Therapist        Physical Therapy Education     Title: PT OT SLP Therapies (In Progress)     Topic: Physical Therapy (In Progress)     Point: Home exercise program (Done)     Learning Progress Summary           Patient Acceptance, E, VU,NR by ROMULO at 1/18/2019 11:00 AM    Comment:  Educated pt/family x 2 on progression of PT POC and benefits of activity   Family Acceptance, E, VU,NR by ROMULO at 1/18/2019 11:00 AM    Comment:  Educated pt/family x 2 on progression of PT POC and benefits of activity                               User Key     Initials Effective Dates Name Provider Type Hue SEBASTIAN 08/02/16 -  Juan Ramon Damon, PT DPT Physical Therapist PT              PT Recommendation and Plan  Anticipated Discharge Disposition (PT): (unknown while on the vent)  Planned Therapy Interventions (PT Eval): bed mobility training, patient/family education, strengthening, ROM (range of motion)  Therapy Frequency (PT Clinical Impression): daily  Outcome Summary/Treatment Plan (PT)  Anticipated Discharge Disposition (PT): (unknown while on the vent)  Plan of Care Reviewed With: patient, family  Outcome Summary: PT eval completed. He remains on the vent with sedation on. He did not follow any  commands at this time. PT assisted RN with rolling left, right and scooting in bed. PT completed PROM to B UE/LE x 10 reps. PT will continue to progress strengthening, command following, and bed mobility as appropriate. PT will re-eval to upudate our POC and goals once extubated.   Outcome Measures     Row Name 01/18/19 1100             How much help from another person do you currently need...    Turning from your back to your side while in flat bed without using bedrails?  1  -ROMULO      Moving from lying on back to sitting on the side of a flat bed without bedrails?  1  -ROMULO      Moving to and from a bed to a chair (including a wheelchair)?  1  -ROMULO      Standing up from a chair using your arms (e.g., wheelchair, bedside chair)?  1  -ROMULO      Climbing 3-5 steps with a railing?  1  -ROMULO      To walk in hospital room?  1  -ROMULO      AM-PAC 6 Clicks Score  6  -ROMULO         Functional Assessment    Outcome Measure Options  AM-PAC 6 Clicks Basic Mobility (PT)  -ROMULO        User Key  (r) = Recorded By, (t) = Taken By, (c) = Cosigned By    Initials Name Provider Type    Juan Ramon Sarabia, PT DPT Physical Therapist         Time Calculation:   PT Charges     Row Name 01/18/19 1306             Time Calculation    Start Time  1100  -ROMULO      Stop Time  1140  -ROMULO      Time Calculation (min)  40 min  -ROMULO      PT Received On  01/18/19  -ROMULO      PT Goal Re-Cert Due Date  01/28/19  -ROMULO        User Key  (r) = Recorded By, (t) = Taken By, (c) = Cosigned By    Initials Name Provider Type    Juan Ramon Sarabia PT DPT Physical Therapist        Therapy Suggested Charges     Code   Minutes Charges    None           Therapy Charges for Today     Code Description Service Date Service Provider Modifiers Qty    78130756096 HC PT EVAL MOD COMPLEXITY 3 1/18/2019 Juan Ramon Damon, PT DPT GP 1          PT G-Codes  Outcome Measure Options: AM-PAC 6 Clicks Basic Mobility (PT)  AM-PAC 6 Clicks Score: 6      Juan Ramon Damon PT DPT  1/18/2019

## 2019-01-18 NOTE — PROGRESS NOTES
PULMONARY AND CRITICAL CARE PROGRESS NOTE - [facility]    Patient: Alan Carlin    1960    MR# [id number]    Acct# [id number]  01/18/19   10:05 AM  Referring Provider: Aniceto Ott MD    Chief Complaint: Mechanically ventilated    Interval history: He remains intubated and sedated today.  PEEP remains at 10 and FiO2 60%. Sat is improved from 90% yesterday to 94-95% today. No new issues overnight. Wife at the bedside. No questions. Central line remains in the groin. Nutrition being addressed    Meds:    custom medication builder 50 mL Intrapleural BID   cefepime 2 g Intravenous Q8H   enoxaparin 40 mg Subcutaneous Q24H   famotidine 20 mg Intravenous BID   furosemide 40 mg Intravenous BID   guaiFENesin 400 mg Oral Q8H   ipratropium-albuterol 3 mL Nebulization Q4H - RT   Morphine 2 mg Intravenous Once   polyethylene glycol 17 g Oral Once   sodium chloride 3 mL Intravenous Q12H   sodium polystyrene 15 g Oral Once   vancomycin 1,250 mg Intravenous Q12H       insulin regular infusion 1 unit/mL 1-20 Units/hr Last Rate: 4 Units/hr (01/18/19 0932)   norepinephrine 0.02-0.3 mcg/kg/min Last Rate: Stopped (01/17/19 2420)   propofol 5-50 mcg/kg/min Last Rate: 30 mcg/kg/min (01/18/19 0759)   sodium chloride 50 mL/hr Last Rate: 50 mL/hr (01/18/19 0051)     Review of Systems:     Cannot obtain due to mechanical ventilation.  The patient notably is critically ill and connected to a ventilator.  As such patient cannot communicate and provide any history whatsoever, including any history of present illness or interval history since arrival or review of systems. The interested reviewer may note this fact, as an attempt has been made at collecting and documenting these portions of the patient history, but this information is unobtainable despite attempted review and therefore cannot be documented at this time.     Ventilator Settings:     Vt (Set, L): 0.6 L  Resp Rate (Set): 18  Pressure Support (cm H2O):  0 cm H20  FiO2 (%): 60 %  PEEP/CPAP (cm H2O): 10 cm H20  Minute Ventilation (L/min) (Obs): 12.4 L/min  Resp Rate (Observed) Vent: 19  I:E Ratio (Set): 1:2.00  I:E Ratio (Obs): 1:2.0  PIP Observed (cm H2O): 31 cm H2O     Physical Exam:  Temp:  [98 °F (36.7 °C)-99.2 °F (37.3 °C)] 98.9 °F (37.2 °C)  Heart Rate:  [] 97  Resp:  [17-25] 19  BP: ()/(53-83) 109/67  FiO2 (%):  [60 %] 60 %    Intake/Output Summary (Last 24 hours) at 1/18/2019 1005  Last data filed at 1/18/2019 0900  Gross per 24 hour   Intake 4100.88 ml   Output 5260 ml   Net -1159.12 ml     SpO2 Percentage    01/18/19 0720 01/18/19 0800 01/18/19 0920   SpO2: 93% 94% 94%      Physical Exam:    Constitutional: He appears well-developed and well-nourished. No distress. Sedated and intubated  HENT:   Head: Normocephalic and atraumatic.   Right Ear: External ear normal.   Left Ear: External ear normal.   Nose: Nose normal.   Eyes: Pupils are equal, round, and reactive to light. Right eye exhibits no discharge. Left eye exhibits no discharge.   Neck: Normal range of motion. Neck supple.   thick   Cardiovascular: Normal rate and regular rhythm.   No murmur heard.  Pulmonary/Chest: He has decreased breath sounds in the left upper field and the left lower field.   Chest tube to left lateral chest, secured, attached to Pleur-evac and suction   Abdominal: Soft. Bowel sounds are normal. He exhibits distension.   Musculoskeletal: He exhibits edema. He exhibits no deformity.   Neurological:   Intubated and sedated    Skin: Skin is warm and dry. No rash noted. He is not diaphoretic. No erythema. Central line to groin  Psychiatric: He has a normal mood and affect. His behavior is normal. Thought content normal.   Nursing note and vitals reviewed.        Results from last 7 days   Lab Units 01/18/19  0418 01/17/19  0322 01/16/19  0306   WBC 10*3/mm3 16.59* 23.69* 32.37*   HEMOGLOBIN g/dL 11.9* 12.2* 12.4*   PLATELETS 10*3/mm3 282 340 376     Results from last 7  days   Lab Units 01/18/19  0418 01/17/19  1747 01/17/19  0835 01/17/19  0322   SODIUM mmol/L 140 137  --  139   POTASSIUM mmol/L 5.4* 5.0 5.4* 5.6*   BUN mg/dL 72* 63*  --  67*   CREATININE mg/dL 0.96 0.99  --  0.93     Results from last 7 days   Lab Units 01/18/19  0306 01/17/19  0428 01/16/19  1534   PH, ARTERIAL pH units 7.434 7.402 7.384   PCO2, ARTERIAL mm Hg 47.0* 48.1* 51.7*   PO2 ART mm Hg 89.5 85.8 70.8*   FIO2 % 60 70 70     Blood Culture   Date Value Ref Range Status   01/16/2019 No growth at 24 hours  Preliminary   01/16/2019 No growth at 24 hours  Preliminary     Respiratory Culture   Date Value Ref Range Status   01/15/2019 Rare Normal Respiratory Ana Lilia  Final   01/15/2019 Rare Candida albicans (A)  Final     Recent films:  Xr Chest 1 View    Result Date: 1/18/2019  No change. Left basilar chest tube is unchanged. This report was finalized on [id number] by Dr Jean-Pierre Solano, .    Xr Chest 1 View    Result Date: 1/17/2019  No interval improvement. This report was finalized on 01/17/2019 07:26 by Dr Jean-Pierre Solano, .    Xr Abdomen Kub    Result Date: 1/16/2019  Satisfactory placement nasogastric tube This report was finalized on 01/16/2019 10:37 by Dr. Jerrod Nunez MD.    Films reviewed personally by me.  My interpretation: Endotracheal tube in good position, small infiltrate in the right lung, chest tube to the left side with poor aeration of the left upper lobe, persistent complex effusion in the left lower lobe    Pulmonary Assessment:    1. Acute hypoxemic respiratory fair requiring mechanical ventilation  2. Empyema, left  3. Type II diabetes  4. Thyroid disease  5. Acute kidney injury  6. Hypotension requiring pressors  7. Sepsis    Recommend:     · Adequate sedation and pain control for ventilated patient  · Continue nebs scheduled  · Chest x-ray and ABG daily while on the ventilator  · Stress ulcer and DVT prophylaxis  · No vent changes.  Should be able to reduce PEEP tomorrow if oxygenation  continues to improve  · Broad-spectrum antibiotic coverage  · Pleural fluid results reviewed  · Altepase and Pulmozyme per CT surgery   · Wife updated at the bedside.    Electronically signed by LUIS E Pope on 1/18/2019 at 10:05 AM    He remains sedated on the ventilator.  He cannot provide any history.  Chest has diminished breath sounds.  He is a little bit flushed but otherwise no other additional new physical findings.  ABG is slightly improved with better PO2, not acidotic.  White count improved which is favorable.  Continue pleural space management per CT surgery.  Continue ventilator as is not ready for spontaneous breathing trials.    I have seen and examined patient personally, performing a face-to-face diagnostic evaluation with plan of care reviewed and developed with APRN and nursing staff. I have addended and/or modified the above history of present illness, physical examination, and assessment and plan to reflect my findings and impressions. Essential elements of the care plan were discussed with APRN above.  Agree with findings and assessment/plan as documented above.    Electronically signed by Damion Wright MD, on 1/18/2019, 11:46 AM    EMR Dragon/Transcription disclaimer: Much of this encounter note is an electronic transcription/translation of spoken language to printed text. The electronic translation of spoken language may permit erroneous, or at times, nonsensical words or phrases to be inadvertently transcribed; although I have reviewed the note for such errors, some may still exist.

## 2019-01-18 NOTE — PROGRESS NOTES
[facility] Medicine Services  INPATIENT PROGRESS NOTE    Patient Name: Alan Carlin  Date of Admission: 1/15/2019  Today's Date: 01/18/19  Length of Stay: 3  Primary Care Physician: Francy Dumont MD    Subjective   Chief Complaint: shortness of breath  HPI     Patient was seen and examined at bedside.  Static and dynamic parameters evaluated on the ventilator both peak and plateau pressures are both less than 30.  Patient remains on FiO2 of 60% and 10 of PEEP.  Patient follows commands when sedation is weaned.  Levophed is now off and patient's hemodynamics are stable.        Review of Systems   Unable to perform ROS: Intubated      All pertinent negatives and positives are as above. All other systems have been reviewed and are negative unless otherwise stated.     Objective    Temp:  [98 °F (36.7 °C)-99.2 °F (37.3 °C)] 98.7 °F (37.1 °C)  Heart Rate:  [] 108  Resp:  [17-25] 19  BP: ()/(53-83) 106/76  FiO2 (%):  [60 %] 60 %  Physical Exam   Constitutional: Vital signs are normal. He appears ill. He is intubated.   HENT:   Head: Normocephalic and atraumatic.   Eyes: Conjunctivae and EOM are normal.   Neck: Neck supple. No JVD present.   Cardiovascular: Regular rhythm, normal heart sounds, intact distal pulses and normal pulses. Tachycardia present.   Pulmonary/Chest: Effort normal. He is intubated. He has decreased breath sounds (left side). He has no wheezes.   Abdominal: Soft. Normal appearance and bowel sounds are normal. There is no tenderness.   Neurological: He is unresponsive.   Skin: Skin is warm and dry. He is not diaphoretic.   Nursing note and vitals reviewed.          Results Review:  I have reviewed the labs, radiology results, and diagnostic studies.    Laboratory Data:   Results from last 7 days   Lab Units 01/18/19  0418 01/17/19  0322 01/16/19  0306   WBC 10*3/mm3 16.59* 23.69* 32.37*   HEMOGLOBIN g/dL 11.9* 12.2* 12.4*   HEMATOCRIT % 37.1* 38.8*  38.1*   PLATELETS 10*3/mm3 282 340 376        Results from last 7 days   Lab Units 01/18/19  0418 01/17/19  1747 01/17/19  0835 01/17/19  0322 01/16/19  0306   SODIUM mmol/L 140 137  --  139 134*   POTASSIUM mmol/L 5.4* 5.0 5.4* 5.6* 4.5   CHLORIDE mmol/L 102 98  --  99 91*   CO2 mmol/L 31.0 33.0*  --  30.0 30.0   BUN mg/dL 72* 63*  --  67* 67*   CREATININE mg/dL 0.96 0.99  --  0.93 1.40   CALCIUM mg/dL 8.6 8.8  --  8.5 9.1   BILIRUBIN mg/dL 0.4  --   --  0.5 0.7   ALK PHOS U/L 77  --   --  105 116   ALT (SGPT) U/L 34  --   --  28 27   AST (SGOT) U/L 42  --   --  31 52*   GLUCOSE mg/dL 348* 363*  --  306* 414*       Culture Data:   [unfilled]    Radiology Data:   Imaging Results (last 24 hours)     Procedure Component Value Units Date/Time    XR Chest 1 View [[id number]] Collected:  01/18/19 0817     Updated:  01/18/19 0723    Narrative:       History:  58-year-old with intubation.     Reference:  Chest radiograph one day prior.     Findings:  Frontal chest radiograph performed.     Moderate partially loculated left pleural effusion is unchanged from  yesterday. There is some aerated left upper lobe, although majority left  lung remains compressed. Mild consolidation right midlung is unchanged.  No pneumothorax.     Endotracheal tube is well-positioned and stable. Enteric tube descends  below diaphragm, tip not imaged. Large bore chest tube in the left base  is unchanged.          Impression:       No change. Left basilar chest tube is unchanged.  This report was finalized on [id number] by Dr Jean-Pierre Solano, .          I have reviewed the patient's current medications.     Assessment/Plan     Active Hospital Problems    Diagnosis   • PNA (pneumonia)   • Acute respiratory failure with hypercapnia (CMS/HCC)   • Controlled type 2 diabetes mellitus without complication, with long-term current use of insulin (CMS/HCC)       Assessment:  1) Acute hypoxic and acute on chronic hypercapnia respiratory failure due to  left-sided pleural effusion (suspected empyema) and right lower lobe community-acquired pneumonia complicated by leukocytosis  2) Septic shock due to community-acquired pneumonia, improved  3) Acute kidney injury, resolved  4) Morbid obesity   5) Normocytic anemia  6) Suppressed TSH, normal Free T4  7) Type 2 DM complicated by hyperglycemia  8) Hyperkalemia  9) Constipation  10) Pulmonary edema     Plan:  1) CT surgery consult, appreciate assistance.  Chest tube placed.  Alteplase.    2) BC x 2 NGTD  3) Respiratory culture NGTD  4) CT to suction  5) Levophed discontinued  6) Continue cefepime, vancomycin - D/C levofloxacin  7) GI Famotidine, DVT Enoxaparin   8) Duonebs q4h scheduled  9) Strep and legionella antigen negative  10) Fluid culture NGTD; WBC very elevated  11) Furosemide 40 mg IV BID today   12) Ramp up tube feeds  13) Bisacodyl suppository                      Discharge Planning: I expect the patient to be discharged to ? in ? Days.   Aniceto Ott MD   01/18/19   8:46 AM

## 2019-01-18 NOTE — PROGRESS NOTES
Continued Stay Note   Janeth     Patient Name: Alan Carlin  MRN: [id number]  Today's Date: 1/18/2019    Admit Date: 1/15/2019    Discharge Plan     Row Name 01/18/19 1128       Plan    Plan  LTAC?    Plan Comments  Patient remains in ICU on vent.  Would patient benefit from LTAC placement?        Discharge Codes    No documentation.             DARLYN Ashraf

## 2019-01-18 NOTE — CONSULTS
13 cm Dual Lumen Power Midline catheter placed in pt left brachial vein. Pt tolerated procedure well. Line flushes and draws well. Sterile dressing applied.

## 2019-01-18 NOTE — PLAN OF CARE
Problem: Patient Care Overview  Goal: Plan of Care Review  Outcome: Ongoing (interventions implemented as appropriate)   01/18/19 6507   OTHER   Outcome Summary Pt remains sedated on mech vent. He has an OG and is receiving TF for nutrition. Rate has been held at 15mL/hour since initiation. RN reports she was given ok to increase rate. She increased the rate to 25mL/hour. Goal rate is Impact 1.5 at 40mL/hour with 2 packets of beneprotein to be flushed in 200mL of water TID. Pt is also receiving propofol for sedation, this is also providing additional calories. Will cont to follow for nutrition needs and POC.   Coping/Psychosocial   Plan of Care Reviewed With other (see comments)   Plan of Care Review   Progress improving       Problem: Nutrition, Enteral (Adult)  Goal: Signs and Symptoms of Listed Potential Problems Will be Absent, Minimized or Managed (Nutrition, Enteral)  Outcome: Ongoing (interventions implemented as appropriate)

## 2019-01-18 NOTE — PLAN OF CARE
Problem: Patient Care Overview  Goal: Plan of Care Review  Outcome: Ongoing (interventions implemented as appropriate)   01/18/19 1100   OTHER   Outcome Summary PT eval completed. He remains on the vent with sedation on. He did not follow any commands at this time. PT assisted RN with rolling left, right and scooting in bed. PT completed PROM to B UE/LE x 10 reps. PT will continue to progress strengthening, command following, and bed mobility as appropriate. PT will re-eval to upudate our POC and goals once extubated.    Coping/Psychosocial   Plan of Care Reviewed With patient;family

## 2019-01-19 NOTE — PROGRESS NOTES
Continued Stay Note   Janeth     Patient Name: Alan Carlin  MRN: [id number]  Today's Date: 1/19/2019    Admit Date: 1/15/2019    Discharge Plan     Row Name 01/19/19 0928       Plan    Plan  Referral made to LTAC.  SW will follow for bed offer.  DARLYN Sanchez.    Patient/Family in Agreement with Plan  yes        Discharge Codes    No documentation.             DARLYN Fletcher

## 2019-01-19 NOTE — DISCHARGE PLACEMENT REQUEST
"From:  Andreia Izquierdo, DARLYN  [phone]    Alan Wheeler (58 y.o. Male)     Date of Birth Social Security Number Address Home Phone MRN    1960  [address] [facility] [phone] [id number]    Restorationist Marital Status          [facility]        Admission Date Admission Type Admitting Provider Attending Provider Department, Room/Bed    1/15/19 Urgent Aniceto Ott MD Fleming, John Eric, MD [facility] INTENSIVE CARE, I012    Discharge Date Discharge Disposition Discharge Destination                       Attending Provider:  Aniceto Ott MD    Allergies:  No Known Allergies    Isolation:  None   Infection:  None   Code Status:  CPR    Ht:  175.3 cm (69\")   Wt:  151 kg (332 lb 14.4 oz)    Admission Cmt:  None   Principal Problem:  None                Active Insurance as of 1/15/2019     Primary Coverage     Payor Plan Insurance Group Employer/Plan Group    [employer] BLUE [employer]OSS [facility] [employer] BLUE SHIELD O [id number]     Payor Plan Address Payor Plan Phone Number Payor Plan Fax Number Effective Dates    [address] [phone]  2010 - None Entered    [place] [address]       Subscriber Name Subscriber Birth Date Member ID       FOUZIA WHEELER 1962 [id number]                 Emergency Contacts      (Rel.) Home Phone Work Phone Mobile Phone    Fouzia Wheeler (Spouse) [phone] --        Case Management  Consult [[id number]] (Order [id number])   Order   Date: 2019 Department: [facility] INTENSIVE CARE Released By/Authorizing: Aniceto Ott MD (auto-released)   Order History   Inpatient   Date/Time Action Taken User Additional Information   19 1629 Release Aniceto Ott MD (auto-released) From Order: [id number]   19 0000      Order Details     Frequency Duration Priority Order Class   Once 1  occurrence Routine Hospital Performed   Order Questions "     Question Answer Comment   Reason for Consult? LTACH referral           Consult Order Info     ID Description Priority Start Date Start Time   [id number] Case Management  Consult Routine 01/18/2019  4:30 PM   Provider Specialty Referred to   ______________________________________ _____________________________________   Acknowledgement Info     For At Acknowledged By Acknowledged On   Placing Order 01/18/19 1626 Head, Maria Victoria SOTO RN 01/18/19 6103   Reprint Order Requisition     Case Management  Consult (Order #[id number]) on 1/18/19   Encounter     View Encounter           Order Provider Info         Office phone Pager E-mail   Ordering User Aniceto Ott [phone] -- --   Authorizing Provider Aniceto Ott [phone] -- --   Attending Provider Aniceto Ott [phone] -- --   Order Transmittal Tracking     Case Management  Consult (Order #[id number]) on 1/18/19            History & Physical      Emmanuel Palm MD at 1/15/2019  8:17 PM              [facility] Medicine Services  HISTORY AND PHYSICAL    Date of Admission: 1/15/2019  Primary Care Physician: Francy Dumont MD    Subjective     Chief Complaint: Patient intubated pneumonia sepsis    History of Present Illness  Patient is a 58-year-old white male past medical history of obstructive sleep apnea uses CPAP as well as type II diabetes and hypertension.  He presents to [facility] with from what his wife says a very long period of illness going on since November.  Apparently he has had upper respiratory type infections that he has not been able to get over.  He has been on antibiotics and steroids off and on for several weeks.  Finally patient states he got to a point where he could not even breathe.  He was sick but actually went to work as a  on Friday.  He subsequently got to the point where he could walk across the room.  He  has had a cough chronically and is gotten worse in the last few days.  When he presented to the emergency room there he was found to have a large left lower lobe infiltrate consistent with pneumonia.  He was admitted started on Rocephin and Zithromax.  He subsequently was also bolused with IV fluids per sepsis protocol.  He had a worsening respiratory failure requiring intubation and his chest x-ray showed a large left-sided pneumonia infiltrate and probably effusion taking a probably two thirds of his long.  Repeat chest x-ray here looks even worse with probably 20% of his long visible in the upper lobe.  Please see report below.  He was hypotensive as well recurrent and is on dobutamine.  Patient has very poor IV access with a right antecubital 20-gauge and a 20-gauge in his foot.  He has a 20 year pack history of smoking but has not smoked in 20 years.  He does work as a  so he works obviously had a cold and moist environment all the time.  He also had an elevated PCO2 and is barely satting above 92% on 100% FiO2.  Interesting of note when he lower that of his bed down very much he desats his face turns bright red and he starts bucking and fighting the vent.        Review of Systems   Able to obtain due to patient is intubated on ventilator  Otherwise complete ROS reviewed and negative except as mentioned in the HPI.    Past Medical History:   Past Medical History:   Diagnosis Date   • Dyslipidemia    • Elevated blood pressure    • Essential hypertension    • Type II diabetes mellitus, uncontrolled (CMS/[facility])      Past Surgical History:No past surgical history on file.  Social History:  reports that he has quit smoking. he has never used smokeless tobacco. He reports that he does not drink alcohol.    Family History: family history includes Diabetes in his other; Thyroid disease in his other.   Patient has diabetes and all of his siblings he also has a twin brother who  in his 40s from a  cardiomyopathy    Allergies:  No Known Allergies  Medications:  Prior to Admission medications    Medication Sig Start Date End Date Taking? Authorizing Provider   atenolol (TENORMIN) 50 MG tablet Take 50 mg by mouth Daily.    ProviderWayne MD   Empagliflozin (JARDIANCE) 10 MG tablet Take 10 mg by mouth Daily. 10/29/18   Jamshid Brunson APRN   Insulin Degludec (TRESIBA FLEXTOUCH) 200 UNIT/ML solution pen-injector Inject 80 Units under the skin into the appropriate area as directed Daily. 10/29/18   Jamshid Brunson APRN   Insulin Lispro (HUMALOG KWIKPEN) 100 UNIT/ML solution pen-injector Inject 40 Units under the skin into the appropriate area as directed 3 (Three) Times a Day. 12/17/18   Jamshid Brunson APRN   Insulin Pen Needle (B-D ULTRAFINE III SHORT PEN) 31G X 8 MM misc Inject 4 times daily 10/29/18   Jamshid Brunson APRN   lisinopril-hydrochlorothiazide (PRINZIDE,ZESTORETIC) 20-25 MG per tablet Take 1 tablet by mouth Daily.    Provider, MD Wayne   metFORMIN (GLUCOPHAGE) 1000 MG tablet Take 1 tablet by mouth 2 (Two) Times a Day. 10/29/18   Jamshid Brunson APRN   ONE TOUCH ULTRA TEST test strip TEST FOUR TIMES DAILY 11/26/18   Jamshid Brunson APRN   rosuvastatin (CRESTOR) 5 MG tablet Take 1 tablet by mouth Every Night. 10/30/18 10/30/19  Jamshid Brunson APRN     Objective     Vital Signs: /77   Pulse 118   Temp 98 °F (36.7 °C) (Axillary)   Wt (!) 154 kg (340 lb 4.8 oz)   SpO2 95%   BMI 48.83 kg/m²    Physical Exam  Gen.  Morbidly obese white male intubated sedated  HEENT: Atraumatic normocephalic pupils equal round reactive to light extraocular movements intact sclerae anicteric TMs negative mucous membranes moist neck is supple without lymphadenopathy ET tube in place as it JVD is noted no carotid bruits are auscultated oropharynx is without erythema or exudate  Chest: Almost no air movement or breath sounds on the left except for  at the apex of the lung coarse breath sounds in right lung  CV: Tachycardic rate and regular rhythm normal S1-S2 no gallops murmurs or rubs  Abdomen: Protuberant nontender nondistended active bowel sounds no hepatosplenomegaly no masses no hernias  Extremities: No clubbing edema or cyanosis capillary refill is normal pulses are 2+ and symmetric at radial and dorsalis pedis posterior tibial pulses are intact and 2+ palpable  Neuro: Patient is intubated and sedated DTRs are 2+ and symmetric unable to perform her cooperate with rest of exam  Skin: Cool dry and intact no evidence of breakdown  Sensorium: Unable to assess  Nursing notes and vital signs reviewed        Results Reviewed:  Lab Results (last 24 hours)     Procedure Component Value Units Date/Time    Blood Gas, Arterial [[id number]]  (Abnormal) Collected:  01/15/19 2010    Specimen:  Arterial Blood Updated:  01/15/19 2013     Site Right Radial     Devon's Test Positive     pH, Arterial 7.327 pH units      Comment: 84 Value below reference range        pCO2, Arterial 57.3 mm Hg      Comment: 83 Value above reference range        pO2, Arterial 68.1 mm Hg      Comment: 84 Value below reference range        HCO3, Arterial 30.0 mmol/L      Comment: 83 Value above reference range        Base Excess, Arterial 2.7 mmol/L      Comment: 83 Value above reference range        O2 Saturation, Arterial 92.5 %      Comment: 84 Value below reference range        Temperature 37.0 C      Barometric Pressure for Blood Gas 757 mmHg      Modality Ventilator     FIO2 100 %      Ventilator Mode AC     Set Tidal Volume 650     Set Mech Resp Rate 20.0     PEEP 5.0     Collected by [id number]     Comment: Meter: [id number]     :  [id number]       Respiratory Culture - Sputum, ET Suction [[id number]] Collected:  01/15/19 2009    Specimen:  Sputum from ET Suction Updated:  01/15/19 2013        Imaging Results (last 24 hours)     Procedure Component Value Units Date/Time    XR Chest  1 View [[id number]] Collected:  01/15/19 2003     Updated:  01/15/19 2008    Narrative:       EXAMINATION: Chest 1 view 1/15/2019     HISTORY: Respiratory failure. Mechanical ventilation     FINDINGS: Endotracheal tube projects at the T3 level. There is volume  loss on the left with mediastinal shift to the left. There is  consolidation within the medial aspect of the left upper lobe as well as  consolidation of the left lower lobe with silhouetting of the left  hemidiaphragm. It is difficult to ascertain whether this represents  atelectasis related to mucoid impaction versus a neoplastic process. I  would suggest follow-up with an enhanced CT of the chest for further  assessment. There is also a nodular opacity within the right midlung  zone which could be infectious in nature but which could also be  assessed at the time of CT imaging. The right lung is otherwise clear.       Impression:       1.. Consolidation and volume loss within both the left upper and left  lower lobe with silhouetting of the left diaphragm, heart and left  mediastinum. It is difficult to ascertain whether this represents mucoid  impaction with partial collapse of segments of the left lung versus a  neoplastic process. CT imaging with IV contrast is recommended.  2. Nodular opacity within the right midlung zone either related to  ongoing infiltrate versus a neoplastic process.  3. Endotracheal tube well-positioned.  This report was finalized on 01/15/2019 20:05 by Dr. Rudy Ahmadi MD.        I have personally reviewed and interpreted the radiology studies and ECG obtained at time of admission.     Assessment / Plan     Assessment:   1.  Community-acquired left upper and left lower lung field pneumonia possible mucus plugging  2.  Acute respiratory failure with hypoxia and hypercapnia requiring intubation and ventilation  3.  Type II diabetes with hyperglycemia  4.  Acute kidney injury with creatinine of 1.8.  GFR of 37 was 1.6 at  [facility]  5.  Sepsis  6.  Hypotension requiring pressors  7.  Poor venous access      Plan:    1.  Admit patient to intensive care unit continue gentle hydration and pressors will get CT scan of chest noncontrast change antibiotics to vancomycin and cefepime and Levaquin.  We will repeat cultures of blood and sputum will also get urine antigens for Legionella and strep pneumo.  Will also get pulmonary consultation patient may need bronchoscopy.  Also for ventilator management.  2.  Continue intubation and ventilation pulmonary consult as stated nebulizer treatments as needed mucolytic therapy.  We will place OG tube is well.  3.  Aggressive glucose management will start insulin drip once central line is placed per critical care protocol.  We sliding scale insulin in the meantime.  Check hemoglobin A1c serial Accu-Cheks.  4.  Check renal ultrasound monitor urine output patient has Hammonds catheter already in place will continue that.  Will gently hydrate and watch to try to avoid nephrotoxic agents. Consult nephrology if creatinine increases  5.  Patient has been bolused previously a do not believe he needs any more aggressive fluids and trying volume overloaded we will continue broad-spectrum antibiotics culture is obtained as above further plans as outlined above.  We will check lactic acid level.  6.  He was on dobutamine will change to Levophed.  7.  We will attempt to place central line if unable will place PICC order.   DVT prophylaxis with lovenox.    Code Status: Full    Patient seen prior to midnight     I discussed the patient's findings and my recommendations with the patient's wife and daughter.  His wife is his healthcare power advocate    Estimated length of stay to be determined    Emmanuel Palm MD   01/15/19   8:17 PM              Electronically signed by Emmanuel Palm MD at 1/16/2019  2:44 AM       Prior to Admission Medications     Prescriptions Last Dose Informant Patient Reported?  Taking?    acetaminophen (TYLENOL) 325 MG tablet 1/13/2019 Spouse/Significant Other Yes Yes    Take 650 mg by mouth 2 (Two) Times a Day.    atenolol (TENORMIN) 50 MG tablet 1/13/2019 Pharmacy Yes Yes    Take 50 mg by mouth Daily.    cetirizine (zyrTEC) 10 MG tablet 1/13/2019 Pharmacy Yes Yes    Take 10 mg by mouth Daily.    cholecalciferol (VITAMIN D3) 1000 units tablet 1/13/2019 Pharmacy Yes Yes    Take 1,000 Units by mouth Daily.    Empagliflozin (JARDIANCE) 10 MG tablet 1/13/2019 Pharmacy Yes Yes    Take 10 mg by mouth Daily.    fluticasone (FLONASE) 50 MCG/ACT nasal spray 1/13/2019 Pharmacy Yes Yes    2 sprays into the nostril(s) as directed by provider 2 (Two) Times a Day.    hydrochlorothiazide (HYDRODIURIL) 25 MG tablet 1/13/2019 Pharmacy Yes Yes    Take 25 mg by mouth Daily.    Insulin Degludec (TRESIBA FLEXTOUCH) 200 UNIT/ML solution pen-injector 1/13/2019 Pharmacy Yes Yes    Inject 60 Units under the skin into the appropriate area as directed Every Night. May increase to 80 units based on BG levels.    Insulin Lispro (HUMALOG KWIKPEN) 100 UNIT/ML solution pen-injector 1/13/2019 Pharmacy No Yes    Inject 40 Units under the skin into the appropriate area as directed 3 (Three) Times a Day.    lisinopril (PRINIVIL,ZESTRIL) 40 MG tablet 1/13/2019 Pharmacy Yes Yes    Take 40 mg by mouth Daily.    metFORMIN (GLUCOPHAGE) 1000 MG tablet 1/13/2019 Pharmacy No Yes    Take 1 tablet by mouth 2 (Two) Times a Day.    promethazine-dextromethorphan (PROMETHAZINE-DM) 6.25-15 MG/5ML syrup 1/13/2019 Pharmacy Yes Yes    Take 5 mL by mouth Every 6 (Six) Hours As Needed for Cough.    rosuvastatin (CRESTOR) 5 MG tablet 1/13/2019 Pharmacy Yes Yes    Take 5 mg by mouth Every Night.          Hospital Medications (active)       Dose Frequency Start End    acetaminophen (TYLENOL) suppository 650 mg 650 mg Every 4 Hours PRN 1/16/2019     Sig - Route: Insert 1 suppository into the rectum Every 4 (Four) Hours As Needed for Fever  "(temperature greater than 101.5 F or headache). - Rectal    Linked Group 1:  \"Or\" Linked Group Details        acetaminophen (TYLENOL) tablet 650 mg 650 mg Every 4 Hours PRN 1/16/2019     Sig - Route: Take 2 tablets by mouth Every 4 (Four) Hours As Needed for Headache or Fever (fever greater than 101.5 F). - Oral    Linked Group 1:  \"Or\" Linked Group Details        albuterol (PROVENTIL) nebulizer solution 0.083% 2.5 mg/3mL 2.5 mg Once As Needed 1/16/2019     Sig - Route: Take 2.5 mg by nebulization 1 (One) Time As Needed for Shortness of Air (Sputum Induction). - Nebulization    alteplase ((CATHFLO/ACTIVASE)) 10 mg, dornase alpha (PULMOZYME) 5 mg, sodium chloride 0.9 % 50 mL compound 50 mL 2 Times Daily 1/17/2019 1/22/2019    Sig - Route: 50 mL by Intrapleural route 2 (Two) Times a Day. - Intrapleural    bisacodyl (DULCOLAX) suppository 10 mg 10 mg Once 1/18/2019 1/18/2019    Sig - Route: Insert 1 suppository into the rectum 1 (One) Time. - Rectal    dextrose (D50W) 25 g/ 50mL Intravenous Solution 25-50 mL 25-50 mL Every 30 Minutes PRN 1/18/2019     Sig - Route: Infuse 25-50 mL into a venous catheter Every 30 (Thirty) Minutes As Needed for Low Blood Sugar (Blood Glucose <70 mg/dL; Per Insulin Infusion Protocol). - Intravenous    dextrose (GLUTOSE) oral gel 15 g 15 g Every 15 Minutes PRN 1/16/2019     Sig - Route: Take 15 g by mouth Every 15 (Fifteen) Minutes As Needed for Low Blood Sugar (Blood sugar less than 70). - Oral    enoxaparin (LOVENOX) syringe 40 mg 40 mg Every 24 Hours 1/16/2019     Sig - Route: Inject 0.4 mL under the skin into the appropriate area as directed Daily. - Subcutaneous    famotidine (PEPCID) injection 20 mg 20 mg 2 Times Daily 1/16/2019     Sig - Route: Infuse 2 mL into a venous catheter 2 (Two) Times a Day. - Intravenous    fentaNYL citrate (PF) (SUBLIMAZE) injection 25 mcg 25 mcg Every 2 Hours PRN 1/18/2019 1/28/2019    Sig - Route: Infuse 0.5 mL into a venous catheter Every 2 (Two) Hours " As Needed for Severe Pain . - Intravenous    furosemide (LASIX) injection 40 mg 40 mg 2 Times Daily (Diuretics) 1/18/2019     Sig - Route: Infuse 4 mL into a venous catheter 2 (Two) Times a Day. - Intravenous    furosemide (LASIX) injection 40 mg 40 mg Once 1/18/2019 1/18/2019    Sig - Route: Infuse 4 mL into a venous catheter 1 (One) Time. - Intravenous    glucagon (human recombinant) (GLUCAGEN DIAGNOSTIC) injection 1 mg 1 mg As Needed 1/16/2019     Sig - Route: Inject 1 mg under the skin into the appropriate area as directed As Needed (Blood Glucose Less Than 70). - Subcutaneous    guaiFENesin (ROBITUSSIN) 100 MG/5ML oral solution 400 mg 400 mg Every 8 Hours Scheduled 1/16/2019     Sig - Route: Take 20 mL by mouth Every 8 (Eight) Hours. - Oral    insulin regular (HumuLIN R,NovoLIN R) 100 Units in sodium chloride 0.9 % 100 mL (1 Units/mL) infusion 1-20 Units/hr Titrated 1/18/2019     Sig - Route: Infuse 1-20 Units/hr into a venous catheter Dose Adjusted By Provider As Needed. - Intravenous    ipratropium (ATROVENT) nebulizer solution 0.5 mg 0.5 mg Every 4 Hours PRN 1/16/2019     Sig - Route: Take 2.5 mL by nebulization Every 4 (Four) Hours As Needed for Shortness of Air. - Nebulization    ipratropium-albuterol (DUO-NEB) nebulizer solution 3 mL 3 mL Every 4 Hours - RT 1/16/2019     Sig - Route: Take 3 mL by nebulization Every 4 (Four) Hours. - Nebulization    Morphine sulfate (PF) injection 2 mg 2 mg Once 1/16/2019     Sig - Route: Infuse 1 mL into a venous catheter 1 (One) Time. - Intravenous    norepinephrine (LEVOPHED) 8,000 mcg in Sodium chloride 0.9 % 250 mL (32 mcg/mL) infusion 0.02-0.3 mcg/kg/min × 154 kg Titrated 1/15/2019     Sig - Route: Infuse 3.08-46.2 mcg/min into a venous catheter Dose Adjusted By Provider As Needed. - Intravenous    ondansetron (ZOFRAN) injection 4 mg 4 mg Every 6 Hours PRN 1/16/2019     Sig - Route: Infuse 2 mL into a venous catheter Every 6 (Six) Hours As Needed for Nausea or  "Vomiting. - Intravenous    Linked Group 2:  \"Or\" Linked Group Details        ondansetron (ZOFRAN) tablet 4 mg 4 mg Every 6 Hours PRN 1/16/2019     Sig - Route: Take 1 tablet by mouth Every 6 (Six) Hours As Needed for Nausea or Vomiting. - Oral    Linked Group 2:  \"Or\" Linked Group Details        ondansetron ODT (ZOFRAN-ODT) disintegrating tablet 4 mg 4 mg Every 6 Hours PRN 1/16/2019     Sig - Route: Take 1 tablet by mouth Every 6 (Six) Hours As Needed for Nausea or Vomiting. - Oral    Linked Group 2:  \"Or\" Linked Group Details        piperacillin-tazobactam (ZOSYN) 4.5 g in iso-osmotic dextrose 100 mL IVPB (premix) 4.5 g Once 1/19/2019     Sig - Route: Infuse 100 mL into a venous catheter 1 (One) Time. - Intravenous    piperacillin-tazobactam (ZOSYN) 4.5 g in iso-osmotic dextrose 100 mL IVPB (premix) 4.5 g Every 8 Hours 1/19/2019 2/2/2019    Sig - Route: Infuse 100 mL into a venous catheter Every 8 (Eight) Hours. - Intravenous    polyethylene glycol 3350 powder (packet) 17 g Once 1/17/2019     Sig - Route: Take 17 g by mouth 1 (One) Time. - Oral    propofol (DIPRIVAN) infusion 10 mg/mL 100 mL 5-50 mcg/kg/min × 154 kg Titrated 1/15/2019     Sig - Route: Infuse 770-7,700 mcg/min into a venous catheter Dose Adjusted By Provider As Needed. - Intravenous    sodium chloride 0.9 % flush 3 mL 3 mL Every 12 Hours Scheduled 1/16/2019     Sig - Route: Infuse 3 mL into a venous catheter Every 12 (Twelve) Hours. - Intravenous    sodium chloride 0.9 % flush 3-10 mL 3-10 mL As Needed 1/16/2019     Sig - Route: Infuse 3-10 mL into a venous catheter As Needed for Line Care. - Intravenous    sodium polystyrene (KAYEXALATE) powder 15 g 15 g Once 1/17/2019     Sig - Route: Take 15 g by mouth 1 (One) Time. - Oral    cefepime (MAXIPIME) 2 g/100 mL 0.9% NS (mbp) (Discontinued) 2 g Every 8 Hours 1/16/2019 1/19/2019    Sig - Route: Infuse 100 mL into a venous catheter Every 8 (Eight) Hours. - Intravenous    Sodium chloride 0.9 % infusion " "(Discontinued) 50 mL/hr Continuous 1/16/2019 1/19/2019    Sig - Route: Infuse 50 mL/hr into a venous catheter Continuous. - Intravenous    vancomycin 1250 mg/250 mL 0.9% NS IVPB ([facility]) (Discontinued) 1,250 mg Every 12 Hours 1/16/2019 1/19/2019    Sig - Route: Infuse 250 mL into a venous catheter Every 12 (Twelve) Hours. - Intravenous    Linked Group 3:  \"Followed by\" Linked Group Details                 Operative/Procedure Notes (most recent note)      Aniceto Ott MD at 1/16/2019  3:09 PM      Procedure Orders    1. Insert Arterial Line [[id number]] ordered by Aniceto Ott MD at 01/16/19 1510            Post-procedure Diagnoses    1. Shock, septic (CMS/HCC) [A41.9, R65.21]             Insert Arterial Line  Date/Time: 1/16/2019 3:10 PM  Performed by: Aniceto Ott MD  Authorized by: Aniceto Ott MD   Consent: Verbal consent obtained.  Risks and benefits: risks, benefits and alternatives were discussed  Consent given by: power of   Required items: required blood products, implants, devices, and special equipment available  Patient identity confirmed: verbally with patient and hospital-assigned identification number  Time out: Immediately prior to procedure a \"time out\" was called to verify the correct patient, procedure, equipment, support staff and site/side marked as required.  Preparation: Patient was prepped and draped in the usual sterile fashion.  Indications: multiple ABGs, respiratory failure and hemodynamic monitoring  Location: left radial    Sedation:  Patient sedated: yes  Sedatives: propofol  Vitals: Vital signs were monitored during sedation.    Devon's test normal: yes  Needle gauge: 20  Seldinger technique: Seldinger technique used  Cutdown: cutdown required  Number of attempts: 1  Post-procedure: line sutured and dressing applied  Post-procedure CMS: normal  Patient tolerance: Patient tolerated the procedure well with no immediate " complications                Electronically signed by Aniceto Ott MD at 1/16/2019  3:14 PM          Physician Progress Notes (most recent note)      Aniceto Ott MD at 1/19/2019  8:23 AM              [facility] Medicine Services  INPATIENT PROGRESS NOTE    Patient Name: Alan Carlin  Date of Admission: 1/15/2019  Today's Date: 01/19/19  Length of Stay: 4  Primary Care Physician: Francy Dumont MD    Subjective   Chief Complaint: shortness of breath   HPI     Patient something and examined at bedside.  Patient remains intubated and sedated.  Patient has had only 250 mL out overnight from his left-sided chest tube.  Patient remains afebrile and hemodynamically stable.    Family bedside, questions addressed.        Review of Systems   Unable to perform ROS: Intubated      All pertinent negatives and positives are as above. All other systems have been reviewed and are negative unless otherwise stated.     Objective    Temp:  [98.3 °F (36.8 °C)-99.4 °F (37.4 °C)] 98.3 °F (36.8 °C)  Heart Rate:  [] 97  Resp:  [16-24] 18  BP: ()/(50-96) 102/62  FiO2 (%):  [60 %-100 %] 100 %  Physical Exam  Constitutional: Vital signs are normal. He appears ill. He is intubated.   HENT:   Head: Normocephalic and atraumatic.   Eyes: Conjunctivae and EOM are normal.   Neck: Neck supple. No JVD present.   Cardiovascular: Regular rhythm, normal heart sounds, intact distal pulses and normal pulses. Tachycardia present.   Pulmonary/Chest: Effort normal. He is intubated. He has decreased breath sounds (left side). He has no wheezes.   Abdominal: Soft. Normal appearance and bowel sounds are normal. There is no tenderness.   Neurological: He is unresponsive.   Skin: Skin is warm and dry. He is not diaphoretic.   Nursing note and vitals reviewed.      Results Review:  I have reviewed the labs, radiology results, and diagnostic studies.    Laboratory Data:   Results from last 7 days   Lab  Units 01/19/19  0230 01/18/19  0418 01/17/19  0322   WBC 10*3/mm3 17.88* 16.59* 23.69*   HEMOGLOBIN g/dL 12.1* 11.9* 12.2*   HEMATOCRIT % 37.9* 37.1* 38.8*   PLATELETS 10*3/mm3 274 282 340        Results from last 7 days   Lab Units 01/19/19  0230 01/18/19  0418 01/17/19  1747  01/17/19  0322   SODIUM mmol/L 144 140 137  --  139   POTASSIUM mmol/L 4.5 5.4* 5.0   < > 5.6*   CHLORIDE mmol/L 104 102 98  --  99   CO2 mmol/L 30.0 31.0 33.0*  --  30.0   BUN mg/dL 76* 72* 63*  --  67*   CREATININE mg/dL 1.07 0.96 0.99  --  0.93   CALCIUM mg/dL 8.8 8.6 8.8  --  8.5   BILIRUBIN mg/dL 0.3 0.4  --   --  0.5   ALK PHOS U/L 74 77  --   --  105   ALT (SGPT) U/L 30 34  --   --  28   AST (SGOT) U/L 42 42  --   --  31   GLUCOSE mg/dL 160* 348* 363*  --  306*    < > = values in this interval not displayed.       Culture Data:   [unfilled]    Radiology Data:   Imaging Results (last 24 hours)     Procedure Component Value Units Date/Time    XR Chest 1 View [[id number]] Collected:  01/19/19 0616     Updated:  01/19/19 0622    Narrative:       EXAM: XR CHEST 1 VW- - 1/19/2019 3:05 AM CST     HISTORY: Intubated Patient; A41.9-Sepsis, unspecified organism;  R65.21-Severe sepsis with septic shock; Z74.09-Other reduced mobility       COMPARISON: 1/18/2019, 1/17/2019.      TECHNIQUE:  1 images.  Frontal view of the chest.     FINDINGS:    Endotracheal tube tip projects over the mid trachea. Feeding tube tip  outside field of view. Left chest tube in similar position compared to  prior.     No right pleural effusion. Right mid lung opacity similar to prior. No  definite left pneumothorax. Similar left basilar consolidation and  effusion.     Similar enlarged cardiac silhouette. Calcified aortic atherosclerosis.          Impression:       1. Similar left basilar consolidation and effusion. Similar right  midlung opacity.  2. Similar prominent cardiac silhouette.  3. Lines and tubes as above.  This report was finalized on 01/19/2019 06:19 by   Kelli Weiner MD.    XR Abdomen KUB [[id number]] Collected:  01/18/19 1130     Updated:  01/18/19 1135    Narrative:       EXAMINATION:   XR ABDOMEN KUB-  1/18/2019 11:30 AM CST     HISTORY: Nasogastric tube     Single view the abdomen is obtained. Nasogastric tube is present distal  tip in the body the stomach.     IMPRESSION satisfactory placement nasogastric tube.  2. Left chest tube is also noted  This report was finalized on 01/18/2019 11:32 by Dr. Jerrod Nunez MD.          I have reviewed the patient's current medications.     Assessment/Plan     Active Hospital Problems    Diagnosis   • PNA (pneumonia)   • Acute respiratory failure with hypercapnia (CMS/HCC)   • Controlled type 2 diabetes mellitus without complication, with long-term current use of insulin (CMS/HCC)       Assessment:  1) Acute hypoxic and acute on chronic hypercapnia respiratory failure due to left-sided pleural effusion (suspected empyema) and right lower lobe community-acquired pneumonia complicated by leukocytosis  2) Septic shock due to community-acquired pneumonia, improved  3) Acute kidney injury, resolved  4) Morbid obesity   5) Normocytic anemia  6) Suppressed TSH, normal Free T4  7) Type 2 DM complicated by hyperglycemia  8) Hyperkalemia  9) Constipation, resolved   10) Pulmonary edema     Plan:  1) CT surgery consult, appreciate assistance.  Chest tube placed, remains on suction  2) BC x 2 NGTD  3) Respiratory culture NGTD  4) GI Famotidine, DVT Enoxaparin   5) Duonebs q4h scheduled  6) Strep and legionella antigen negative  7) Fluid culture NGTD; WBC very elevated  8) Furosemide 40 mg IV BID today   9) Continue alteplase per CT surgery   10) Patient hypoxic when entered the room, turned PEEP to 14 and FIO2 to 100% to recruit aveoli.                Discharge Planning: I expect the patient to be discharged to ? in ? days.    Aniceto Ott MD   01/19/19   8:23 AM    Electronically signed by Aniceto Ott MD at 1/19/2019   8:46 AM          Consult Notes (most recent note)      Damion Wright MD at 1/16/2019  8:24 AM              PULMONARY AND CRITICAL CARE CONSULT - [facility]    Alan Carlin   MR# [id number]  Acct# [id number]  1/16/2019   8:25 AM    Referring Provider: Aniceto Ott MD    Chief Complaint: Mechanically ventilated    HPI: We are consulted by Aniceto Ott MD to see this 58 y.o. male born on 1960.  Patient is presently intubated and sedated and unable to provide any information.  Per my review the records and talking with the staff [facility] complicated pneumonia and pleural effusion.  Per the records he indicated he had been sick for several weeks with an upper respiratory illness.  He had been on several rounds of antibiotics and steroids without improvement.  He is employed as a  and had gone to work but was unable to ambulate across the room without becoming short of breath therefore [facility]  He is since been intubated.  He has received fluid resuscitation for sepsis.  He is on IV antibiotics as well as pressors.  Blood pressure prior to chest tube placement was in the 70s systolic.  Blood pressure is now in the 130s systolic post evacuation of the large fluid collection.  There is presently no family at the bedside.  Rate, tidal volume have been reduced, PEEP has been increased and FiO2 is also been reduced.  His nebs and then changed to scheduled as opposed to when necessary.  Plans later in the day for placing an arterial line.     Past Medical History   has a past medical history of Dyslipidemia, Elevated blood pressure, Essential hypertension, and Type II diabetes mellitus, uncontrolled (CMS/[facility]).   has no past surgical history on file.  No Known Allergies  Medications    cefepime 2 g Intravenous Q8H   enoxaparin 40 mg Subcutaneous Q24H   famotidine 20 mg Intravenous BID    guaiFENesin 400 mg Oral Q8H   levoFLOXacin 750 mg Intravenous Q24H   lidocaine      lidocaine      Morphine (PF)      Morphine 5 mg Intravenous Once   Morphine sulfate (PF)      Morphine 2 mg Intravenous Once   sodium chloride 3 mL Intravenous Q12H   vancomycin 1,250 mg Intravenous Q12H       insulin regular infusion 1 unit/mL 1-20 Units/hr    norepinephrine 0.02-0.3 mcg/kg/min Last Rate: 0.04 mcg/kg/min (01/16/19 0519)   propofol 5-50 mcg/kg/min Last Rate: 40 mcg/kg/min (01/16/19 0610)   Sodium chloride 50 mL/hr Last Rate: 50 mL/hr (01/16/19 0041)     Social History   reports that he has quit smoking. he has never used smokeless tobacco. He reports that he does not drink alcohol.  Family History  family history includes Diabetes in his other; Thyroid disease in his other.  Review of Systems:    Cannot obtain due to mechanical ventilation.  The patient notably is critically ill and connected to a ventilator.  As such patient cannot communicate and provide any history whatsoever, including any history of present illness or interval history since arrival or review of systems. The interested reviewer may note this fact, as an attempt has been made at collecting and documenting these portions of the patient history, but this information is unobtainable despite attempted review and therefore cannot be documented at this time.     Physical Exam:  Temp:  [97.8 °F (36.6 °C)-98 °F (36.7 °C)] 97.8 °F (36.6 °C)  Heart Rate:  [] 103  Resp:  [16-22] 18  BP: ()/(60-94) 118/77  FiO2 (%):  [70 %-100 %] 70 %    Intake/Output Summary (Last 24 hours) at 1/16/2019 0825  Last data filed at 1/16/2019 0400  Gross per 24 hour   Intake 591.1 ml   Output 2650 ml   Net -2058.9 ml         01/15/19  1930   Weight: (!) 154 kg (340 lb 4.8 oz)     SpO2 Percentage    01/16/19 0615 01/16/19 0635 01/16/19 0758   SpO2: 96% 96% 95%     Physical Exam   Constitutional: He appears well-developed and well-nourished. No distress.   HENT:    Head: Normocephalic and atraumatic.   Right Ear: External ear normal.   Left Ear: External ear normal.   Nose: Nose normal.   Eyes: Pupils are equal, round, and reactive to light. Right eye exhibits no discharge. Left eye exhibits no discharge.   Neck: Normal range of motion. Neck supple.   thick   Cardiovascular: Normal rate and regular rhythm.   No murmur heard.  Pulmonary/Chest: He has decreased breath sounds in the left upper field and the left lower field.   Chest tube to left lateral chest, secured, attached to Pleur-evac and suction   Abdominal: Soft. Bowel sounds are normal. He exhibits distension.   Musculoskeletal: He exhibits edema. He exhibits no deformity.   Neurological:   Intubated and sedated    Skin: Skin is warm and dry. No rash noted. He is not diaphoretic. No erythema.   Psychiatric: He has a normal mood and affect. His behavior is normal. Thought content normal.   Nursing note and vitals reviewed.    Ventilator Settings:     Vt (Set, L): 0.6 L(changed per Dr. Ott and Thalia Mix, APRN)  Resp Rate (Set): 18  Pressure Support (cm H2O): 0 cm H20  FiO2 (%): 70 %(changed per Dr. Ott and Thalia Mix, APRN)  PEEP/CPAP (cm H2O): 8 cm H20(changed per Dr. Ott and Thalia Mix, APRN)  Minute Ventilation (L/min) (Obs): 12.8 L/min  Resp Rate (Observed) Vent: 18  I:E Ratio (Set): 1:1.60  I:E Ratio (Obs): 1:1.1  PIP Observed (cm H2O): 34 cm H2O  Plateau Pressure (cm H2O): 26 cm H2O     Results from last 7 days   Lab Units 01/16/19  0306 01/15/19  2133   WBC 10*3/mm3 32.37* 25.17*   HEMOGLOBIN g/dL 12.4* 12.2*   PLATELETS 10*3/mm3 376 310     Results from last 7 days   Lab Units 01/16/19  0306 01/15/19  2133   SODIUM mmol/L 134* 133*   POTASSIUM mmol/L 4.5 5.1   CO2 mmol/L 30.0 31.0   BUN mg/dL 67* 70*   CREATININE mg/dL 1.40 1.87*   MAGNESIUM mg/dL 2.5* 2.3*   PHOSPHORUS mg/dL 3.0 4.9*   GLUCOSE mg/dL 414* 495*     Results from last 7 days   Lab Units 01/16/19  0335 01/16/19  0004  01/15/19  2010   PH, ARTERIAL pH units 7.498* 7.453* 7.327*   PCO2, ARTERIAL mm Hg 37.7 41.7 57.3*   PO2 ART mm Hg 83.7 74.7* 68.1*   FIO2 % 100 100 100        Lab Results   Component Value Date    PROBNP 691.0 01/16/2019     Recent radiology:   Imaging Results (last 72 hours)     Procedure Component Value Units Date/Time    XR Chest 1 View [[id number]] Updated:  01/16/19 0805    CT Chest Without Contrast [[id number]] Collected:  01/16/19 0725     Updated:  01/16/19 0735    Narrative:       EXAMINATION:   CT CHEST WO CONTRAST-  1/16/2019 7:25 AM CST     CT CHEST WO CONTRAST- 1/15/2019 10:47 PM CST     HISTORY: Pneumonia complicated / unresolved     COMPARISON: None     DOSE LENGTH PRODUCT: 1112 mGy cm. Automated exposure control was also  utilized to decrease patient radiation dose.     TECHNIQUE: Serial helical tomographic images of the chest were acquired.  Multiplanar reformatted images were provided for review.     FINDINGS:  The imaged portion of the neck and thyroid gland is unremarkable.      Infiltrates present in the right lower lobe. Opacification of the left  hemithorax. There is consolidation of the left lung and a moderate to  large left pleural complex. Only a small segment of the left upper lobe  is aerated.. The left pleural complex is large pleural complex. Is may  be causing compression atelectasis of the consolidated left lung.  Tracheal tube is present. Nasogastric tube is satisfactorily position..     The heart, great vessels, and pulmonary vessels are normal in appearance  within limits of a noncontrast study. There is no pericardial effusion.  No enlarged axillary or mediastinal lymph nodes are present.      No acute findings are seen in the bones or surrounding soft tissues.     Calculi are noted in the gallbladder.       Impression:       1. Large left pleural complex. There is consolidation of the left lung.  Only a small segment of the left upper lobe is aerated.  2. Small infiltrate right  lower lobe        This report was finalized on 01/16/2019 07:32 by Dr. Jerrod Nunez MD.    XR Chest 1 View [[id number]] Collected:  01/16/19 0700     Updated:  01/16/19 0705    Narrative:       History:  58-year-old to confirm endotracheal tube placement.     Reference:  CT chest 1 day prior.     Findings:  Frontal chest radiograph performed.     There remains consolidation in the right lower lobe. Large left pleural  effusion compressing the majority the left lung. Alternatively this  could be extensive left lung pneumonia with parapneumonic fluid.  Atherosclerotic calcifications. No pneumothorax. Enteric tube extends  below diaphragm, tip not imaged. Endotracheal tube is in good position  at the level of the sternoclavicular joints.          Impression:       Satisfactory endotracheal tube.  This report was finalized on 01/16/2019 07:02 by Dr Jean-Pierre Solano, .    XR Chest 1 View [[id number]] Collected:  01/15/19 2003     Updated:  01/15/19 2008    Narrative:       EXAMINATION: Chest 1 view 1/15/2019     HISTORY: Respiratory failure. Mechanical ventilation     FINDINGS: Endotracheal tube projects at the T3 level. There is volume  loss on the left with mediastinal shift to the left. There is  consolidation within the medial aspect of the left upper lobe as well as  consolidation of the left lower lobe with silhouetting of the left  hemidiaphragm. It is difficult to ascertain whether this represents  atelectasis related to mucoid impaction versus a neoplastic process. I  would suggest follow-up with an enhanced CT of the chest for further  assessment. There is also a nodular opacity within the right midlung  zone which could be infectious in nature but which could also be  assessed at the time of CT imaging. The right lung is otherwise clear.       Impression:       1.. Consolidation and volume loss within both the left upper and left  lower lobe with silhouetting of the left diaphragm, heart and left  mediastinum. It is  difficult to ascertain whether this represents mucoid  impaction with partial collapse of segments of the left lung versus a  neoplastic process. CT imaging with IV contrast is recommended.  2. Nodular opacity within the right midlung zone either related to  ongoing infiltrate versus a neoplastic process.  3. Endotracheal tube well-positioned.  This report was finalized on 01/15/2019 20:05 by Dr. Rudy Ahmadi MD.        My radiograph interpretation/independent review of imaging: Endotracheal tube in good position, some interstitial changes in the right midlung, large amount of consolidation in the left lung with a large parapneumonic effusion    Other test results (not lab or imaging): none    Independent review of ekg: Normal sinus rhythm, rate 98, QT interval 441    Problem List as identified by Epic (may contain historical, inactive problems)  Patient Active Problem List   Diagnosis   • Controlled type 2 diabetes mellitus without complication, with long-term current use of insulin (CMS/[facility])   • Hyperlipidemia   • Vitamin D deficiency   • Essential hypertension   • Upper respiratory tract infection   • PNA (pneumonia)   • Acute respiratory failure with hypercapnia (CMS/[facility])     Pulmonary Assessment:  SEVERE ACUTE RESPIRATORY FAILURE REQUIRING MECHANICAL VENTILATION  This is a threat to life or pulmonary function, high risk, due to  Complicated pneumonia    New problem (to me), with additional workup planned: Parapneumonic effusion    New problem (to me), no additional workup planned: Diabetes type II, defer to attending    Other problems either stable, failing to improve or worsenin. Thyroid disease  2. Acute kidney injury  3. Hypotension requiring pressors  4. Sepsis    Recommend/plan:     · Adequate sedation and pain control for ventilated patient  · Neb treatments changed to scheduled  · Chest x-ray and ABG daily while on the ventilator  · Stress ulcer and DVT prophylaxis  · Rate reduced to  18  · Tidal volume reduced to 600  · PEEP increased to 7  · FiO2 reduced to 60  · Broad-spectrum antibiotic coverage  · Awaiting pleural fluid results.  We will defer management of chest tube to CT surgery    Electronically signed by LUIS E Pope on 1/16/2019 at 8:25 AM     Patient who is transferred to this facility intubated mechanically ventilated with severe sepsis, shock hypotension and diabetes.  Chest x-ray shows patchy infiltrates bilaterally.  Gastric tube in the stomach.  Prior to my arrival he had a large pleural effusion drained with a chest tube placed and pleural fluid has been sent, results pending.  We have adjusted the ventilator.  Chest exam shows diminished breath sounds peak pressure is 32 and there is no dyssynchrony.  Abdomen is obese and the neck is very thick.  Initiate ventilator bundle DVT and ulcer prophylaxis, sedation with propofol appears to be effective.    I have seen and examined patient personally, performing a face-to-face diagnostic evaluation with plan of care reviewed and developed with APRN and nursing staff. I have addended and/or modified the above history of present illness, physical examination, and assessment and plan to reflect my findings and impressions. Essential elements of the care plan were discussed with APRN above.  Agree with findings and assessment/plan as documented above.    Electronically signed by Damion Wright MD, on 1/16/2019, 3:38 PM    EMR Dragon/Transcription disclaimer: Much of this encounter note is an electronic transcription/translation of spoken language to printed text. The electronic translation of spoken language may permit erroneous, or at times, nonsensical words or phrases to be inadvertently transcribed; although I have reviewed the note for such errors, some may still exist.        Electronically signed by Damion Wright MD at 1/16/2019  3:40 PM          Physical Therapy Notes (most recent note)      Juan Ramon Damon,  PT DPT at 2019  1:07 PM  Version 1 of 1         Acute Care - Physical Therapy Initial Evaluation  [facility]     Patient Name: Alan Carlin  : 1960  MRN: [id number]  Today's Date: 2019   Onset of Illness/Injury or Date of Surgery: 01/15/19  Date of Referral to PT: 19  Referring Physician: Dr. Ott([facility])      Admit Date: 1/15/2019    Visit Dx:     ICD-10-CM ICD-9-CM   1. Shock, septic (CMS/HCC) A41.9 038.9    R65.21 785.52     995.92   2. Impaired mobility Z74.09 799.89     Patient Active Problem List   Diagnosis   • Controlled type 2 diabetes mellitus without complication, with long-term current use of insulin (CMS/HCC)   • Hyperlipidemia   • Vitamin D deficiency   • Essential hypertension   • Upper respiratory tract infection   • PNA (pneumonia)   • Acute respiratory failure with hypercapnia (CMS/HCC)     Past Medical History:   Diagnosis Date   • Dyslipidemia    • Elevated blood pressure    • Essential hypertension    • Type II diabetes mellitus, uncontrolled (CMS/HCC)      History reviewed. No pertinent surgical history.     PT ASSESSMENT (last 12 hours)      Physical Therapy Evaluation     Row Name 19 1100          PT Evaluation Time/Intention    Document Type  evaluation  -ROMULO     Mode of Treatment  physical therapy  -ROMULO     Row Name 19 1100          General Information    Patient Profile Reviewed?  yes  -ROMULO     Onset of Illness/Injury or Date of Surgery  01/15/19  -ROMULO     Referring Physician  Dr. Ott ROM  -ROMULO     Patient Observations  unable to respond on vent, sedation on  -ROMULO     Patient/Family Observations  family x 2 in room  -ROMULO     General Observations of Patient  Fowlers in bed, on vent  -ROMULO     Prior Level of Function  independent:;all household mobility;community mobility;ADL's;dressing;bathing;work  -ROMULO     Pertinent History of Current Functional Problem  SOB, cough, weakness. Dx: Community-acquired left upper & left lower lung PNA, possible mucus plugging.  Septic shock, Acute resp. failure with hypoxia & hypercapnia requiring intubation. s/p 1/16 placement of left thoracostomy tube, altepase and pulmozyme per CT surgery.   -ROMULO     Existing Precautions/Restrictions  fall;oxygen therapy device and L/min vent, femoral line, chest tube  -ROMULO     Risks Reviewed  patient:;LOB;nausea/vomiting;dizziness;increased discomfort;change in vital signs;increased drainage;lines disloged;family:  -ROMULO     Benefits Reviewed  patient:;family:;improve function;increase independence;increase strength;increase balance;decrease pain;increase knowledge;improve skin integrity  -ROMULO     Barriers to Rehab  medically complex  -ROMULO     Row Name 01/18/19 1100          Cognitive Assessment/Interventions    Additional Documentation  Cognitive Assessment/Intervention (Group)  -ROMULO     Row Name 01/18/19 1100          Cognitive Assessment/Intervention- PT/OT    Orientation Status (Cognition)  unable/difficult to assess  -ROMULO     Follows Commands (Cognition)  does not follow one step commands;unable/difficult to assess on vent, sedation on  -ROMULO     Personal Safety Interventions  fall prevention program maintained;muscle strengthening facilitated;nonskid shoes/slippers when out of bed  -ROMULO     Row Name 01/18/19 1100          Safety Issues, Functional Mobility    Impairments Affecting Function (Mobility)  strength;range of motion (ROM)  -ROMULO     Row Name 01/18/19 1100          Bed Mobility Assessment/Treatment    Bed Mobility Assessment/Treatment  rolling left;rolling right;scooting/bridging  -ROMULO     Rolling Left Carlock (Bed Mobility)  dependent (less than 25% patient effort);2 person assist  -ROMULO     Rolling Right [place] (Bed Mobility)  dependent (less than 25% patient effort);2 person assist  -ROMULO     Scooting/Bridging Carlock (Bed Mobility)  dependent (less than 25% patient effort);2 person assist  -ROMULO     Assistive Device (Bed Mobility)  draw sheet  -ROMULO     Comment (Bed Mobility)  assisted RN  with scooting and rolling L and R  -ROMULO     Row Name 01/18/19 1100          General ROM    GENERAL ROM COMMENTS  B UE/LE PROM WFL  -ROMULO     Row Name 01/18/19 1100          MMT (Manual Muscle Testing)    General MMT Comments  unable to accurately assess, sedated on the vent  -ROMULO     Row Name 01/18/19 1100          Motor Assessment/Intervention    Additional Documentation  Therapeutic Exercise Interventions (Group)  -ROMULO     Row Name 01/18/19 1100          Therapeutic Exercise    Upper Extremity Range of Motion (Therapeutic Exercise)  wrist flexion/extension, bilateral;elbow flexion/extension, bilateral;shoulder internal/external rotation, bilateral;shoulder horizontal abduction/adduction, bilateral;shoulder flexion/extension, bilateral  -ROMULO     Lower Extremity Range of Motion (Therapeutic Exercise)  ankle dorsiflexion/plantar flexion, bilateral;knee flexion/extension, bilateral;hip abduction/adduction, bilateral  -ROMULO     Exercise Type (Therapeutic Exercise)  PROM (passive range of motion)  -ROMULO     Position (Therapeutic Exercise)  supine  -ROMULO     Sets/Reps (Therapeutic Exercise)  10 reps  -ROMULO     Expected Outcome (Therapeutic Exercise)  facilitate normal movement patterns  -ROMULO     Row Name 01/18/19 1100          Pain Assessment    Additional Documentation  Pain Scale: FACES Pre/Post-Treatment (Group)  -ROMULO     Linda Name 01/18/19 1100          Pain Scale: FACES Pre/Post-Treatment    Pain: FACES Scale, Pretreatment  0-->no hurt  -ROMULO     Pain: FACES Scale, Post-Treatment  0-->no hurt  -ROMULO     [place] Name 01/18/19 1100          Health Promotion    Additional Documentation  Coping (Group);Plan of Care Review (Group)  -ROMULO     [place] Name 01/18/19 1100          Coping    Observed Emotional State  calm  -ROMULO     [facility] Name 01/18/19 1100          Plan of Care Review    Plan of Care Reviewed With  patient;family  -ROMULO     [facility] Name 01/18/19 1100          Physical Therapy Clinical Impression    Date of Referral to PT  01/18/19  -ROMULO      Patient/Family Goals Statement (PT Clinical Impression)  Increase strength/mobility  -ROMULO     Criteria for Skilled Interventions Met (PT Clinical Impression)  yes;treatment indicated  -ROMULO     Impairments Found (describe specific impairments)  gait, locomotion, and balance  -ROMULO     Rehab Potential (PT Clinical Summary)  good, to achieve stated therapy goals  -ROMULO     Predicted Duration of Therapy (PT)  until d/c  -ROMULO     Care Plan Review (PT)  evaluation/treatment results reviewed;risks/benefits reviewed;current/potential barriers reviewed;patient/other agree to care plan  -ROMULO     Care Plan Review, Other Participant (PT Clinical Impression)  family  -ROMULO     Row Name 01/18/19 1100          Vital Signs    Post Systolic BP Rehab  109  -ROMULO     Post Treatment Diastolic BP  75  -ROMULO     Posttreatment Heart Rate (beats/min)  99  -ROMULO     Post SpO2 (%)  92  -ROMULO     O2 Delivery Post Treatment  -- vent  -ROMULO     Pre Patient Position  Supine  -ROMULO     Intra Patient Position  Supine  -ROMULO     Post Patient Position  Supine  -ROMULO     Row Name 01/18/19 1100          Physical Therapy Goals    Bed Mobility Goal Selection (PT)  bed mobility, PT goal 1  -ROMULO     ROM Goal Selection (PT)  ROM, PT goal 1  -ROMULO     Additional Documentation  ROM Goal Selection (PT) (Row)  -ROMULO     Row Name 01/18/19 1100          Bed Mobility Goal 1 (PT)    Activity/Assistive Device (Bed Mobility Goal 1, PT)  rolling to left;rolling to right  -ROMULO     [place] Level/Cues Needed (Bed Mobility Goal 1, PT)  moderate assist (50-74% patient effort) assist x 2  -ROMULO     Time Frame (Bed Mobility Goal 1, PT)  long term goal (LTG);10 days  -ROMULO     Progress/Outcomes (Bed Mobility Goal 1, PT)  goal ongoing  -ROMULO     Row Name 01/18/19 1100          ROM Goal 1 (PT)    ROM Goal 1 (PT)  AAROM/AROM B UE/LE x 20 reps, min assist  -ROMULO     Time Frame (ROM Goal 1, PT)  long term goal (LTG)  -ROMULO     Progress/Outcome (ROM Goal 1, PT)  goal ongoing  -ROMULO     Row Name 01/18/19 1100           Patient Education Goal (PT)    Activity (Patient Education Goal, PT)  No new evidence of skin breakdown or contractures while on the vent  -ROMULO     Baker/Cues/Accuracy (Memory Goal 2, PT)  demonstrates adequately  -ROMULO     Time Frame (Patient Education Goal, PT)  long term goal (LTG);10 days  -ROMULO     Progress/Outcome (Patient Education Goal, PT)  goal ongoing  -ROMULO     Row Name 01/18/19 1100          Positioning and Restraints    Pre-Treatment Position  in bed  -ROMULO     Post Treatment Position  bed  -ROMULO     In Bed  fowlers;side lying right;call light within reach;encouraged to call for assist;patient within staff view;with family/caregiver  -ROMULO       User Key  (r) = Recorded By, (t) = Taken By, (c) = Cosigned By    Initials Name Provider Type    Juan Ramon Sarabia, PT DPT Physical Therapist        Physical Therapy Education     Title: PT OT SLP Therapies (In Progress)     Topic: Physical Therapy (In Progress)     Point: Home exercise program (Done)     Learning Progress Summary           Patient Acceptance, E, VU,NR by ROMULO at 1/18/2019 11:00 AM    Comment:  Educated pt/family x 2 on progression of PT POC and benefits of activity   Family Acceptance, E, VU,NR by ROMULO at 1/18/2019 11:00 AM    Comment:  Educated pt/family x 2 on progression of PT POC and benefits of activity                               User Key     Initials Effective Dates Name Provider Type Hue SEBASTIAN 08/02/16 -  Juan Ramon Damon, PT DPT Physical Therapist PT              PT Recommendation and Plan  Anticipated Discharge Disposition (PT): (unknown while on the vent)  Planned Therapy Interventions (PT Eval): bed mobility training, patient/family education, strengthening, ROM (range of motion)  Therapy Frequency (PT Clinical Impression): daily  Outcome Summary/Treatment Plan (PT)  Anticipated Discharge Disposition (PT): (unknown while on the vent)  Plan of Care Reviewed With: patient, family  Outcome Summary: PT eval completed. He remains on  the vent with sedation on. He did not follow any commands at this time. PT assisted RN with rolling left, right and scooting in bed. PT completed PROM to B UE/LE x 10 reps. PT will continue to progress strengthening, command following, and bed mobility as appropriate. PT will re-eval to upudate our POC and goals once extubated.   Outcome Measures     Row Name 01/18/19 1100             How much help from another person do you currently need...    Turning from your back to your side while in flat bed without using bedrails?  1  -ROMULO      Moving from lying on back to sitting on the side of a flat bed without bedrails?  1  -ROMULO      Moving to and from a bed to a chair (including a wheelchair)?  1  -ROMULO      Standing up from a chair using your arms (e.g., wheelchair, bedside chair)?  1  -ROMULO      Climbing 3-5 steps with a railing?  1  -ROMULO      To walk in hospital room?  1  -ROMULO      AM-PAC 6 Clicks Score  6  -ROMULO         Functional Assessment    Outcome Measure Options  AM-PAC 6 Clicks Basic Mobility (PT)  -ROMULO        User Key  (r) = Recorded By, (t) = Taken By, (c) = Cosigned By    Initials Name Provider Type    Juan Ramon Sarabia PT DPT Physical Therapist         Time Calculation:   PT Charges     Row Name 01/18/19 1306             Time Calculation    Start Time  1100  -ROMULO      Stop Time  1140  -ROMULO      Time Calculation (min)  40 min  -ROMULO      PT Received On  01/18/19  -ROMULO      PT Goal Re-Cert Due Date  01/28/19  -ROMULO        User Key  (r) = Recorded By, (t) = Taken By, (c) = Cosigned By    Initials Name Provider Type    Juan Ramon Sarabia PT DPT Physical Therapist        Therapy Suggested Charges     Code   Minutes Charges    None           Therapy Charges for Today     Code Description Service Date Service Provider Modifiers Qty    [id number] HC PT EVAL MOD COMPLEXITY 3 1/18/2019 Juan Ramon Damon PT DPT GP 1          PT G-Codes  Outcome Measure Options: AM-PAC 6 Clicks Basic Mobility (PT)  AM-PAC 6 Clicks Score:  6      Juan Ramon Damon, PT DPT  1/18/2019         Electronically signed by Juan Ramon Damon, PT DPT at 1/18/2019  1:08 PM       Occupational Therapy Notes (most recent note)     No notes of this type exist for this encounter.        Speech Language Pathology Notes (most recent note)     No notes of this type exist for this encounter.        Respiratory Therapy Notes (most recent note)     No notes of this type exist for this encounter.

## 2019-01-19 NOTE — PROGRESS NOTES
"[facility] PULMONARY CARE         Dr Javier Sayied   LOS: 4 days   Patient Care Team:  Francy Dumont MD as PCP - General (Family Medicine)  Erin Nolasco MA as Medical Assistant    Chief Complaint: Acute respiratory failure status post left empyema status post chest tube placement    Interval History: Events noted chart reviewed.  Currently on 100% FiO2 with PEEP of 10.  Patient sedated intubated.  I adjusted the vent with pressure-controlled ventilation with PEEP of 12.5 FiO2 100% and pressure control ventilation.  Vision tolerating it well.    REVIEW OF SYSTEMS:   Sedated intubated.    Ventilator/Non-Invasive Ventilation Settings (From admission, onward)    Start     Ordered    01/16/19 0824  Ventilator - AC/VC; (18); 60; 90%; Other (see comments); 7; 600  Continuous     Question Answer Comment   Vent Mode AC/VC    Breath rate  18   FiO2 60    FiO2 titrate for Sp02% =/> 90%    PEEP Other (see comments)    PEEP: 7    Tidal Volume 600        01/16/19 0824    01/16/19 0033  Ventilator - AC/VC; (22); 100; 90%; 5; (700)  Continuous,   Status:  Canceled     Question Answer Comment   Vent Mode AC/VC    Breath rate  22   FiO2 100    FiO2 titrate for Sp02% =/> 90%    PEEP 5    Tidal Volume  700       01/16/19 0033    01/16/19 0008  Ventilator - AC/VC; 100; 90%  Continuous,   Status:  Canceled     Question Answer Comment   Vent Mode AC/VC    FiO2 100    FiO2 titrate for Sp02% =/> 90%        01/16/19 0016            Vital Signs  Temp:  [98.3 °F (36.8 °C)-99.4 °F (37.4 °C)] 99 °F (37.2 °C)  Heart Rate:  [] 91  Resp:  [16-24] 18  BP: ()/(48-96) 91/50  FiO2 (%):  [60 %-100 %] 90 %    Intake/Output Summary (Last 24 hours) at 1/19/2019 1624  Last data filed at 1/19/2019 1600  Gross per 24 hour   Intake 4336.18 ml   Output 2480 ml   Net 1856.18 ml     Flowsheet Rows      First Filed Value   Admission Height  175.3 cm (69\") Documented at 01/15/2019 1930   Admission Weight  154 kg (340 lb 4.8 oz)  " (Abnormal)  Documented at 01/15/2019 1930          Physical Exam:   General Appearance:    Sedated intubated.  Not following commands for me.  ET tube good position orally.     Lungs:    Diminished breath sounds on the left.      Heart:    Regular rhythm and normal rate, normal S1 and S2, no            murmur, no gallop, no rub, no click   Chest Wall:    No abnormalities observed   Abdomen:     Normal bowel sounds, no masses, no organomegaly, soft        non-tender, non-distended, no guarding, no rebound                tenderness   Extremities:   Moves all extremities well, 1+ edema, no cyanosis, no             redness  CNS sedated intubated      Results Review:        Results from last 7 days   Lab Units 01/19/19  0230 01/18/19  0418 01/17/19  1747   SODIUM mmol/L 144 140 137   POTASSIUM mmol/L 4.5 5.4* 5.0   CHLORIDE mmol/L 104 102 98   CO2 mmol/L 30.0 31.0 33.0*   BUN mg/dL 76* 72* 63*   CREATININE mg/dL 1.07 0.96 0.99   GLUCOSE mg/dL 160* 348* 363*   CALCIUM mg/dL 8.8 8.6 8.8     Results from last 7 days   Lab Units 01/15/19  2133   TROPONIN I ng/mL 0.015     Results from last 7 days   Lab Units 01/19/19  0230 01/18/19  0418 01/17/19  0322   WBC 10*3/mm3 17.88* 16.59* 23.69*   HEMOGLOBIN g/dL 12.1* 11.9* 12.2*   HEMATOCRIT % 37.9* 37.1* 38.8*   PLATELETS 10*3/mm3 274 282 340     Results from last 7 days   Lab Units 01/15/19  2133   INR  1.68*   APTT seconds 39.2*         Results from last 7 days   Lab Units 01/16/19  0306   MAGNESIUM mg/dL 2.5*         Results from last 7 days   Lab Units 01/19/19  1448   PH, ARTERIAL pH units 7.415   PO2 ART mm Hg 87.8   PCO2, ARTERIAL mm Hg 48.5*   HCO3 ART mmol/L 31.1*       I reviewed the patient's new clinical results.  I personally viewed and interpreted the patient's CXR        Medication Review:     custom medication builder 50 mL Intrapleural BID   enoxaparin 40 mg Subcutaneous Q24H   famotidine 20 mg Intravenous BID   furosemide 40 mg Intravenous BID   guaiFENesin 400  mg Oral Q8H   ipratropium-albuterol 3 mL Nebulization Q4H - RT   piperacillin-tazobactam 4.5 g Intravenous Q8H   polyethylene glycol 17 g Oral Once   sodium chloride 3 mL Intravenous Q12H   sodium polystyrene 15 g Oral Once         insulin regular infusion 1 unit/mL 1-20 Units/hr Last Rate: 12 Units/hr (01/19/19 0910)   norepinephrine 0.02-0.3 mcg/kg/min Last Rate: Stopped (01/17/19 5800)   propofol 5-50 mcg/kg/min Last Rate: 35 mcg/kg/min (01/19/19 6869)       ASSESSMENT:   1. Acute hypoxemic respiratory fair requiring mechanical ventilation  2. Empyema, left status post chest tube placement with multiple unclogging of the chest tube  3. Type II diabetes  4. Thyroid disease  5. Acute kidney injury  6. Hypotension requiring pressors  7. Sepsis           PLAN:  Vent was adjusted with pressure-controlled ventilation and increase PEEP  ABGs improved post right changes  Patient is to be sedated with rales -1 to -2  Antibiotics to be continued.  Adjust based on cultures  We will wean off oxygen as patient tolerates  Plans per thoracic surgery noted to repeat CT chest  Continue supportive care  Tube feeds  Discussed with family at bedside      Concepcion Harris MD  01/19/19  4:24 PM      Time: Critical care 35 min

## 2019-01-19 NOTE — PROGRESS NOTES
"Patient: Alan Carlin  : 1960     Procedure:     Procedure Date:     POD: * No surgery found *    Subjective      Sats: 87% earlier today. FIO2 increased from 60% to 100%  Hemodynamically fairly stable.    No major events overnight  Remains on the ventilator, sedated on Propofol  Chest tube in place  Tolerating tube feedings  On TPA/pulmozyme instillation through the chest tube to complete 5 days      Scheduled Meds:  custom medication builder 50 mL Intrapleural BID   enoxaparin 40 mg Subcutaneous Q24H   famotidine 20 mg Intravenous BID   furosemide 40 mg Intravenous BID   guaiFENesin 400 mg Oral Q8H   ipratropium-albuterol 3 mL Nebulization Q4H - RT   Morphine 2 mg Intravenous Once   piperacillin-tazobactam 4.5 g Intravenous Q8H   polyethylene glycol 17 g Oral Once   sodium chloride 3 mL Intravenous Q12H   sodium polystyrene 15 g Oral Once     Continuous Infusions:  insulin regular infusion 1 unit/mL 1-20 Units/hr Last Rate: 12 Units/hr (19 0910)   norepinephrine 0.02-0.3 mcg/kg/min Last Rate: Stopped (19 1730)   propofol 5-50 mcg/kg/min Last Rate: 35 mcg/kg/min (19 1023)     PRN Meds:.•  acetaminophen **OR** acetaminophen  •  albuterol  •  dextrose  •  dextrose  •  fentaNYL citrate (PF)  •  glucagon (human recombinant)  •  ipratropium  •  ondansetron **OR** ondansetron ODT **OR** ondansetron  •  sodium chloride      Objective   Visit Vitals  BP (!) 89/48   Pulse 89   Temp 99 °F (37.2 °C) (Axillary)   Resp 18   Ht 175.3 cm (69\")   Wt (!) 151 kg (332 lb 14.4 oz)   SpO2 96%   BMI 49.16 kg/m²       Intake/Output Summary (Last 24 hours) at 2019 1123  Last data filed at 2019 0910  Gross per 24 hour   Intake 3572.18 ml   Output 3260 ml   Net 312.18 ml                                        Lab Results:    CBC:   Results from last 7 days   Lab Units 19  0230 19  0418 19  0322   WBC 10*3/mm3 17.88* 16.59* 23.69*   HEMATOCRIT % 37.9* 37.1* 38.8*   PLATELETS 10*3/mm3 " 274 282 340     BMP:   Results from last 7 days   Lab Units 01/19/19  0230 01/18/19  0418 01/17/19  1747   SODIUM mmol/L 144 140 137   POTASSIUM mmol/L 4.5 5.4* 5.0   CHLORIDE mmol/L 104 102 98   CO2 mmol/L 30.0 31.0 33.0*   GLUCOSE mg/dL 160* 348* 363*   BUN mg/dL 76* 72* 63*   CREATININE mg/dL 1.07 0.96 0.99     Coag:   Results from last 7 days   Lab Units 01/15/19  2133   INR  1.68*   APTT seconds 39.2*       Images:  Imaging Results (last 24 hours)     Procedure Component Value Units Date/Time    XR Chest 1 View [[id number]] Collected:  01/19/19 0616     Updated:  01/19/19 0622    Narrative:       EXAM: XR CHEST 1 VW- - 1/19/2019 3:05 AM CST     HISTORY: Intubated Patient; A41.9-Sepsis, unspecified organism;  R65.21-Severe sepsis with septic shock; Z74.09-Other reduced mobility       COMPARISON: 1/18/2019, 1/17/2019.      TECHNIQUE:  1 images.  Frontal view of the chest.     FINDINGS:    Endotracheal tube tip projects over the mid trachea. Feeding tube tip  outside field of view. Left chest tube in similar position compared to  prior.     No right pleural effusion. Right mid lung opacity similar to prior. No  definite left pneumothorax. Similar left basilar consolidation and  effusion.     Similar enlarged cardiac silhouette. Calcified aortic atherosclerosis.          Impression:       1. Similar left basilar consolidation and effusion. Similar right  midlung opacity.  2. Similar prominent cardiac silhouette.  3. Lines and tubes as above.  This report was finalized on 01/19/2019 06:19 by Dr Kelli Weiner MD.    XR Abdomen KUB [[id number]] Collected:  01/18/19 1130     Updated:  01/18/19 1135    Narrative:       EXAMINATION:   XR ABDOMEN KUB-  1/18/2019 11:30 AM CST     HISTORY: Nasogastric tube     Single view the abdomen is obtained. Nasogastric tube is present distal  tip in the body the stomach.     IMPRESSION satisfactory placement nasogastric tube.  2. Left chest tube is also noted  This report was  finalized on 01/18/2019 11:32 by Dr. Jerrod Nunez MD.        Images Today:    CXR still with blunting of the left lower lung field, the left upper lung field appears to be clearing up some.    Physical Exam:   Gen.: He is sedated on the ventilator, on propofol  (Will awaken and follow commands when not sedated)  Chest: Rales and wheezes throughout.  Fairly good breath sounds on the right markedly decreased on the left. CT in place. No subcutaneous emphysema.  Heart: Regular rate and rhythm  Abdomen: Globular, no guarding, no obvious tenderness.    Impression    57 yo gentleman, morbidly obese, with left empyema, respiratory failure, left chest tube in place, following 5 day course of alteplase/pulmozyme instillation through the left CT to attempt to release any retained loculations.       Plan: Continue present treatment of installation of Pulmozyme and alteplase. Follow up CT chest once treatment completed.    Sotero Gutierrez MD  01/19/19  11:23 AM

## 2019-01-19 NOTE — PLAN OF CARE
Problem: Skin Injury Risk (Adult)  Goal: Identify Related Risk Factors and Signs and Symptoms  Outcome: Ongoing (interventions implemented as appropriate)    Goal: Skin Health and Integrity  Outcome: Ongoing (interventions implemented as appropriate)      Problem: Fall Risk (Adult)  Goal: Identify Related Risk Factors and Signs and Symptoms  Outcome: Ongoing (interventions implemented as appropriate)    Goal: Absence of Fall  Outcome: Ongoing (interventions implemented as appropriate)      Problem: Patient Care Overview  Goal: Plan of Care Review  Outcome: Ongoing (interventions implemented as appropriate)   01/19/19 0608   OTHER   Outcome Summary Pt has been on propofol all throughout the night. Propofol is at 35 mcg right now. Insulin drip is at 8 units/hr. During neuro checks, the patients  were equal, followed commands, and eyes were PERRLA. Patient had BM x1 thorughout the night and appears to be resting comfortably.    Coping/Psychosocial   Plan of Care Reviewed With patient   Plan of Care Review   Progress improving       Problem: Nutrition, Enteral (Adult)  Goal: Signs and Symptoms of Listed Potential Problems Will be Absent, Minimized or Managed (Nutrition, Enteral)  Outcome: Ongoing (interventions implemented as appropriate)      Problem: Ventilation, Mechanical Invasive (Adult)  Goal: Signs and Symptoms of Listed Potential Problems Will be Absent, Minimized or Managed (Ventilation, Mechanical Invasive)  Outcome: Ongoing (interventions implemented as appropriate)      Problem: Sepsis/Septic Shock (Adult)  Goal: Signs and Symptoms of Listed Potential Problems Will be Absent, Minimized or Managed (Sepsis/Septic Shock)  Outcome: Ongoing (interventions implemented as appropriate)      Problem: Pneumonia (Adult)  Goal: Signs and Symptoms of Listed Potential Problems Will be Absent, Minimized or Managed (Pneumonia)  Outcome: Ongoing (interventions implemented as appropriate)

## 2019-01-19 NOTE — PROGRESS NOTES
[facility] Medicine Services  INPATIENT PROGRESS NOTE    Patient Name: Alan Carlin  Date of Admission: 1/15/2019  Today's Date: 01/19/19  Length of Stay: 4  Primary Care Physician: Francy Dumont MD    Subjective   Chief Complaint: shortness of breath   HPI     Patient something and examined at bedside.  Patient remains intubated and sedated.  Patient has had only 250 mL out overnight from his left-sided chest tube.  Patient remains afebrile and hemodynamically stable.    Family bedside, questions addressed.        Review of Systems   Unable to perform ROS: Intubated      All pertinent negatives and positives are as above. All other systems have been reviewed and are negative unless otherwise stated.     Objective    Temp:  [98.3 °F (36.8 °C)-99.4 °F (37.4 °C)] 98.3 °F (36.8 °C)  Heart Rate:  [] 97  Resp:  [16-24] 18  BP: ()/(50-96) 102/62  FiO2 (%):  [60 %-100 %] 100 %  Physical Exam  Constitutional: Vital signs are normal. He appears ill. He is intubated.   HENT:   Head: Normocephalic and atraumatic.   Eyes: Conjunctivae and EOM are normal.   Neck: Neck supple. No JVD present.   Cardiovascular: Regular rhythm, normal heart sounds, intact distal pulses and normal pulses. Tachycardia present.   Pulmonary/Chest: Effort normal. He is intubated. He has decreased breath sounds (left side). He has no wheezes.   Abdominal: Soft. Normal appearance and bowel sounds are normal. There is no tenderness.   Neurological: He is unresponsive.   Skin: Skin is warm and dry. He is not diaphoretic.   Nursing note and vitals reviewed.      Results Review:  I have reviewed the labs, radiology results, and diagnostic studies.    Laboratory Data:   Results from last 7 days   Lab Units 01/19/19  0230 01/18/19  0418 01/17/19  0322   WBC 10*3/mm3 17.88* 16.59* 23.69*   HEMOGLOBIN g/dL 12.1* 11.9* 12.2*   HEMATOCRIT % 37.9* 37.1* 38.8*   PLATELETS 10*3/mm3 274 282 340        Results from  last 7 days   Lab Units 01/19/19  0230 01/18/19  0418 01/17/19  1747  01/17/19  0322   SODIUM mmol/L 144 140 137  --  139   POTASSIUM mmol/L 4.5 5.4* 5.0   < > 5.6*   CHLORIDE mmol/L 104 102 98  --  99   CO2 mmol/L 30.0 31.0 33.0*  --  30.0   BUN mg/dL 76* 72* 63*  --  67*   CREATININE mg/dL 1.07 0.96 0.99  --  0.93   CALCIUM mg/dL 8.8 8.6 8.8  --  8.5   BILIRUBIN mg/dL 0.3 0.4  --   --  0.5   ALK PHOS U/L 74 77  --   --  105   ALT (SGPT) U/L 30 34  --   --  28   AST (SGOT) U/L 42 42  --   --  31   GLUCOSE mg/dL 160* 348* 363*  --  306*    < > = values in this interval not displayed.       Culture Data:   [unfilled]    Radiology Data:   Imaging Results (last 24 hours)     Procedure Component Value Units Date/Time    XR Chest 1 View [[id number]] Collected:  01/19/19 0616     Updated:  01/19/19 0622    Narrative:       EXAM: XR CHEST 1 VW- - 1/19/2019 3:05 AM CST     HISTORY: Intubated Patient; A41.9-Sepsis, unspecified organism;  R65.21-Severe sepsis with septic shock; Z74.09-Other reduced mobility       COMPARISON: 1/18/2019, 1/17/2019.      TECHNIQUE:  1 images.  Frontal view of the chest.     FINDINGS:    Endotracheal tube tip projects over the mid trachea. Feeding tube tip  outside field of view. Left chest tube in similar position compared to  prior.     No right pleural effusion. Right mid lung opacity similar to prior. No  definite left pneumothorax. Similar left basilar consolidation and  effusion.     Similar enlarged cardiac silhouette. Calcified aortic atherosclerosis.          Impression:       1. Similar left basilar consolidation and effusion. Similar right  midlung opacity.  2. Similar prominent cardiac silhouette.  3. Lines and tubes as above.  This report was finalized on 01/19/2019 06:19 by Dr Kelli Weiner MD.    XR Abdomen KUB [[id number]] Collected:  01/18/19 1130     Updated:  01/18/19 1135    Narrative:       EXAMINATION:   XR ABDOMEN KUB-  1/18/2019 11:30 AM CST     HISTORY: Nasogastric  tube     Single view the abdomen is obtained. Nasogastric tube is present distal  tip in the body the stomach.     IMPRESSION satisfactory placement nasogastric tube.  2. Left chest tube is also noted  This report was finalized on 01/18/2019 11:32 by Dr. Jerrod Nunez MD.          I have reviewed the patient's current medications.     Assessment/Plan     Active Hospital Problems    Diagnosis   • PNA (pneumonia)   • Acute respiratory failure with hypercapnia (CMS/HCC)   • Controlled type 2 diabetes mellitus without complication, with long-term current use of insulin (CMS/HCC)       Assessment:  1) Acute hypoxic and acute on chronic hypercapnia respiratory failure due to left-sided pleural effusion (suspected empyema) and right lower lobe community-acquired pneumonia complicated by leukocytosis  2) Septic shock due to community-acquired pneumonia, improved  3) Acute kidney injury, resolved  4) Morbid obesity   5) Normocytic anemia  6) Suppressed TSH, normal Free T4  7) Type 2 DM complicated by hyperglycemia  8) Hyperkalemia  9) Constipation, resolved   10) Pulmonary edema     Plan:  1) CT surgery consult, appreciate assistance.  Chest tube placed, remains on suction  2) BC x 2 NGTD  3) Respiratory culture NGTD  4) GI Famotidine, DVT Enoxaparin   5) Duonebs q4h scheduled  6) Strep and legionella antigen negative  7) Fluid culture NGTD; WBC very elevated  8) Furosemide 40 mg IV BID today   9) Continue alteplase per CT surgery   10) Patient hypoxic when entered the room, turned PEEP to 14 and FIO2 to 100% to recruit aveoli.    11) Insulin gtt    Discussed with bedside RN Denita and RT Raul.     Approximately 35 minutes of critical care time were spent managing the patient exclusive of billable procedures.  This included adjusting ventilator for PEEP recruitment, monitoring gtts and adjusting as needed.  This also included evaluation of the diagnostic images and labs.         Discharge Planning: I expect the patient to be  discharged to ? in ? days.    Aniceto Ott MD   01/19/19   8:23 AM

## 2019-01-20 NOTE — PROGRESS NOTES
[facility] Medicine Services  INPATIENT PROGRESS NOTE    Patient Name: Alan Carlin  Date of Admission: 1/15/2019  Today's Date: 01/20/19  Length of Stay: 5  Primary Care Physician: Francy Dumont MD    Subjective   Chief Complaint: shortness of breath  HPI     Patient was seen and examined with up.  Patient continues to be on high PEEP and high FiO2.  Patient continues to be hypotensive, we are trying to maintain maps greater than 60..    Patient's creatinine has remained stable.  Tomorrow the patient will undergo CT scan of his chest to evaluate the left-sided pleural effusion.        Review of Systems   Unable to perform ROS: Intubated        All pertinent negatives and positives are as above. All other systems have been reviewed and are negative unless otherwise stated.     Objective    Temp:  [98.7 °F (37.1 °C)-99.5 °F (37.5 °C)] 98.7 °F (37.1 °C)  Heart Rate:  [] 94  Resp:  [18-25] 24  BP: ()/(42-76) 93/56  FiO2 (%):  [90 %-100 %] 90 %  Physical Exam  Constitutional: Vital signs are normal. He appears ill. He is intubated.   HENT:   Head: Normocephalic and atraumatic.   Eyes: Conjunctivae and EOM are normal.   Neck: Neck supple. No JVD present.   Cardiovascular: Regular rhythm, normal heart sounds, intact distal pulses and normal pulses.Normal rate.   Pulmonary/Chest: Effort normal. He is intubated. He has decreased breath sounds (left side). He has no wheezes.   Abdominal: Soft. Normal appearance and bowel sounds are normal. There is no tenderness.   Neurological: He is unresponsive.   Skin: Skin is warm and dry. He is not diaphoretic.   Nursing note and vitals reviewed.      Results Review:  I have reviewed the labs, radiology results, and diagnostic studies.    Laboratory Data:   Results from last 7 days   Lab Units 01/20/19  0353 01/19/19  0230 01/18/19  0418   WBC 10*3/mm3 16.02* 17.88* 16.59*   HEMOGLOBIN g/dL 11.3* 12.1* 11.9*   HEMATOCRIT % 35.5*  37.9* 37.1*   PLATELETS 10*3/mm3 268 274 282        Results from last 7 days   Lab Units 01/20/19  0353 01/19/19  0230 01/18/19  0418  01/17/19  0322   SODIUM mmol/L 142 144 140   < > 139   POTASSIUM mmol/L 4.9 4.5 5.4*   < > 5.6*   CHLORIDE mmol/L 107 104 102   < > 99   CO2 mmol/L 30.0 30.0 31.0   < > 30.0   BUN mg/dL 86* 76* 72*   < > 67*   CREATININE mg/dL 1.16 1.07 0.96   < > 0.93   CALCIUM mg/dL 8.6 8.8 8.6   < > 8.5   BILIRUBIN mg/dL  --  0.3 0.4  --  0.5   ALK PHOS U/L  --  74 77  --  105   ALT (SGPT) U/L  --  30 34  --  28   AST (SGOT) U/L  --  42 42  --  31   GLUCOSE mg/dL 159* 160* 348*   < > 306*    < > = values in this interval not displayed.       Culture Data:   [unfilled]    Radiology Data:   Imaging Results (last 24 hours)     Procedure Component Value Units Date/Time    XR Chest 1 View [[id number]] Collected:  01/20/19 0835     Updated:  01/20/19 0841    Narrative:       EXAM: XR CHEST 1 VW- - 1/20/2019 8:32 AM CST     HISTORY: ET tube, chest tubes; A41.9-Sepsis, unspecified organism;  R65.21-Severe sepsis with septic shock; Z74.09-Other reduced mobility       COMPARISON: 1/19/2019.      TECHNIQUE:  1 images.  Frontal view of the chest.     FINDINGS:    Endotracheal tube tip projects over the mid trachea. Gastric tube tip  outside the field of view. Similar left chest tube at the inferior  chest.     Opacifications at the left upper and left lower lung as well as right  midlung. Large left pleural effusion. No pneumothorax. Cardiac and  mediastinal silhouette partially obscured by pulmonary opacities.  Cardiac silhouette appears prominent. No acute bony finding.          Impression:       1. Similar left greater than right opacities. Large left pleural  effusion. Left chest tube.  This report was finalized on 01/20/2019 08:38 by Dr Kelli Weiner MD.          I have reviewed the patient's current medications.     Assessment/Plan     Active Hospital Problems    Diagnosis   • PNA (pneumonia)   •  Acute respiratory failure with hypercapnia (CMS/HCC)   • Controlled type 2 diabetes mellitus without complication, with long-term current use of insulin (CMS/HCC)       1) Acute hypoxic and acute on chronic hypercapnia respiratory failure due to left-sided pleural effusion (suspected empyema) and right lower lobe community-acquired pneumonia complicated by leukocytosis  2) Septic shock due to community-acquired pneumonia, improved  3) Acute kidney injury, resolved  4) Morbid obesity   5) Normocytic anemia  6) Suppressed TSH, normal Free T4  7) Type 2 DM complicated by hyperglycemia  8) Hyperkalemia  9) Constipation, resolved   10) Pulmonary edema     Plan:  1) CT surgery consult, appreciate assistance.  Chest tube placed, remains on suction  2) BC x 2 NGTD  3) Respiratory culture NGTD  4) GI Famotidine, DVT Enoxaparin   5) Duonebs q4h scheduled  6) Strep and legionella antigen negative  7) Fluid culture NGTD; WBC very elevated  8) Furosemide 40 mg IV BID today   9) Continue alteplase per CT surgery   10) spO2 96%, decreased FIO2 to 80%, keep PEEP 12.5  11) Insulin gtt  12) Consider LTACH, may be a good candidate  13) CXR per my review shows no significant change in the left-sided pleural effusion unchanged with minimal aerated lung; right sided opacities are stable                 Discharge Planning: I expect the patient to be discharged to ? in ? days.    Aniceto Ott MD   01/20/19   9:20 AM

## 2019-01-20 NOTE — PLAN OF CARE
Problem: Patient Care Overview  Goal: Plan of Care Review  Outcome: Outcome(s) achieved Date Met: 01/20/19 01/20/19 5868   OTHER   Outcome Summary Patient alert and oriented during sedation vacation.  equal and follows commands. Chest tube present to left chest. Dressing changed and clean dry and intact. TPA infused per chest tube and patient turned every 30 minutes for 2 hours. Minimal output from chest tube. insulin drip at 3 units/hr per protocol. No bm during shift. Will continue to monitor.    Coping/Psychosocial   Plan of Care Reviewed With patient;spouse   Plan of Care Review   Progress no change

## 2019-01-20 NOTE — PROGRESS NOTES
"[facility] PULMONARY CARE         Dr Javier Sayied   LOS: 5 days   Patient Care Team:  Francy Dumont MD as PCP - General (Family Medicine)  Erin Nolasco MA as Medical Assistant    Chief Complaint: Acute respiratory failure status post left empyema status post chest tube placement    Interval History: Events noted chart reviewed.  Currently on 80% FiO2 with PEEP of 12.5.  Patient sedated intubated.  Not following commands for me    REVIEW OF SYSTEMS:   Sedated intubated.    Ventilator/Non-Invasive Ventilation Settings (From admission, onward)    Start     Ordered    01/16/19 0824  Ventilator - AC/VC; (18); 60; 90%; Other (see comments); 7; 600  Continuous     Question Answer Comment   Vent Mode AC/VC    Breath rate  18   FiO2 60    FiO2 titrate for Sp02% =/> 90%    PEEP Other (see comments)    PEEP: 7    Tidal Volume 600        01/16/19 0824    01/16/19 0033  Ventilator - AC/VC; (22); 100; 90%; 5; (700)  Continuous,   Status:  Canceled     Question Answer Comment   Vent Mode AC/VC    Breath rate  22   FiO2 100    FiO2 titrate for Sp02% =/> 90%    PEEP 5    Tidal Volume  700       01/16/19 0033    01/16/19 0008  Ventilator - AC/VC; 100; 90%  Continuous,   Status:  Canceled     Question Answer Comment   Vent Mode AC/VC    FiO2 100    FiO2 titrate for Sp02% =/> 90%        01/16/19 0016            Vital Signs  Temp:  [98.5 °F (36.9 °C)-99.5 °F (37.5 °C)] 98.5 °F (36.9 °C)  Heart Rate:  [] 88  Resp:  [18-25] 20  BP: ()/(42-79) 91/58  FiO2 (%):  [80 %-90 %] 80 %    Intake/Output Summary (Last 24 hours) at 1/20/2019 1458  Last data filed at 1/20/2019 1419  Gross per 24 hour   Intake 2267.39 ml   Output 2680 ml   Net -412.61 ml     Flowsheet Rows      First Filed Value   Admission Height  175.3 cm (69\") Documented at 01/15/2019 1930   Admission Weight  154 kg (340 lb 4.8 oz)  (Abnormal)  Documented at 01/15/2019 1930          Physical Exam:   General Appearance:    Sedated intubated.  Not " following commands for me.  ET tube good position orally.     Lungs:    Diminished breath sounds on the left.      Heart:    Regular rhythm and normal rate, normal S1 and S2, no            murmur, no gallop, no rub, no click   Chest Wall:    No abnormalities observed   Abdomen:     Normal bowel sounds, no masses, no organomegaly, soft        non-tender, non-distended, no guarding, no rebound                tenderness   Extremities:   Moves all extremities well, 1+ edema, no cyanosis, no             redness  CNS sedated intubated      Results Review:        Results from last 7 days   Lab Units 01/20/19  0353 01/19/19  0230 01/18/19  0418   SODIUM mmol/L 142 144 140   POTASSIUM mmol/L 4.9 4.5 5.4*   CHLORIDE mmol/L 107 104 102   CO2 mmol/L 30.0 30.0 31.0   BUN mg/dL 86* 76* 72*   CREATININE mg/dL 1.16 1.07 0.96   GLUCOSE mg/dL 159* 160* 348*   CALCIUM mg/dL 8.6 8.8 8.6     Results from last 7 days   Lab Units 01/15/19  2133   TROPONIN I ng/mL 0.015     Results from last 7 days   Lab Units 01/20/19  0353 01/19/19  0230 01/18/19  0418   WBC 10*3/mm3 16.02* 17.88* 16.59*   HEMOGLOBIN g/dL 11.3* 12.1* 11.9*   HEMATOCRIT % 35.5* 37.9* 37.1*   PLATELETS 10*3/mm3 268 274 282     Results from last 7 days   Lab Units 01/15/19  2133   INR  1.68*   APTT seconds 39.2*         Results from last 7 days   Lab Units 01/16/19  0306   MAGNESIUM mg/dL 2.5*         Results from last 7 days   Lab Units 01/20/19  0340   PH, ARTERIAL pH units 7.425   PO2 ART mm Hg 73.8*   PCO2, ARTERIAL mm Hg 47.2*   HCO3 ART mmol/L 30.9*       I reviewed the patient's new clinical results.  I personally viewed and interpreted the patient's CXR        Medication Review:     custom medication builder 50 mL Intrapleural BID   enoxaparin 40 mg Subcutaneous Q24H   famotidine 20 mg Intravenous BID   guaiFENesin 400 mg Oral Q8H   ipratropium-albuterol 3 mL Nebulization Q4H - RT   piperacillin-tazobactam 4.5 g Intravenous Q8H   polyethylene glycol 17 g Oral Once    sodium chloride 3 mL Intravenous Q12H   sodium polystyrene 15 g Oral Once         insulin regular infusion 1 unit/mL 1-20 Units/hr Last Rate: 7 Units/hr (01/20/19 1409)   norepinephrine 0.02-0.3 mcg/kg/min Last Rate: Stopped (01/17/19 1730)   propofol 5-50 mcg/kg/min Last Rate: 35 mcg/kg/min (01/20/19 1406)       ASSESSMENT:   1. Acute hypoxemic respiratory fair requiring mechanical ventilation  2. Empyema, left status post chest tube placement with multiple unclogging of the chest tube  3. Type II diabetes  4. Thyroid disease  5. Acute kidney injury  6. Hypotension requiring pressors  7. Sepsis           PLAN:  Continue current vent support with 80% FiO2 and PEEP 12.5.  Wean FiO2 as tolerated.  Once FiO2 to 60% May start decreasing the PEEP.  ABGs stable with FiO2 74.  Patient is to be sedated with rales -1 to -2  Antibiotics to be continued.  Adjust based on cultures  We will wean off oxygen as patient tolerates  Plans per thoracic surgery noted to repeat CT chest model and decide further course of action.  Continue supportive care  Tube feeds  Discussed with family at bedside      Concepcion Harris MD  01/20/19  2:58 PM      Time: Critical care 35 min

## 2019-01-20 NOTE — PROGRESS NOTES
"Patient: Alan Carlin  : 1960     Procedure:     Procedure Date:     POD: * No surgery found *    Subjective      Remains with marginal oxygenation. FIO2 up to 90%, PEEP of 12.5  Marginal BP, however, no need for vasopressor support.  Chest tube in place.  Tolerating tube feedings  On TPA/pulmozyme instillation through the chest tube to complete 5 days      Scheduled Meds:    custom medication builder 50 mL Intrapleural BID   enoxaparin 40 mg Subcutaneous Q24H   famotidine 20 mg Intravenous BID   guaiFENesin 400 mg Oral Q8H   ipratropium-albuterol 3 mL Nebulization Q4H - RT   piperacillin-tazobactam 4.5 g Intravenous Q8H   polyethylene glycol 17 g Oral Once   sodium chloride 3 mL Intravenous Q12H   sodium polystyrene 15 g Oral Once     Continuous Infusions:    insulin regular infusion 1 unit/mL 1-20 Units/hr Last Rate: 3 Units/hr (19 08)   norepinephrine 0.02-0.3 mcg/kg/min Last Rate: Stopped (19)   propofol 5-50 mcg/kg/min Last Rate: 35 mcg/kg/min (19)     PRN Meds:.•  acetaminophen **OR** acetaminophen  •  albuterol  •  dextrose  •  dextrose  •  fentaNYL citrate (PF)  •  glucagon (human recombinant)  •  ipratropium  •  ondansetron **OR** ondansetron ODT **OR** ondansetron  •  sodium chloride      Objective   Visit Vitals  BP 93/56   Pulse 94   Temp 98.7 °F (37.1 °C)   Resp 24   Ht 175.3 cm (69\")   Wt (!) 153 kg (336 lb 13.8 oz)   SpO2 93%   BMI 49.75 kg/m²       Intake/Output Summary (Last 24 hours) at 2019 0942  Last data filed at 2019 0406  Gross per 24 hour   Intake 2000 ml   Output 2130 ml   Net -130 ml     Left CT: 160 ml/24 hours.                                   Lab Results:    CBC:   Results from last 7 days   Lab Units 19  0353 19  0230 19  0418   WBC 10*3/mm3 16.02* 17.88* 16.59*   HEMATOCRIT % 35.5* 37.9* 37.1*   PLATELETS 10*3/mm3 268 274 282     BMP:   Results from last 7 days   Lab Units 19  0353 19  0230 19  0418 "   SODIUM mmol/L 142 144 140   POTASSIUM mmol/L 4.9 4.5 5.4*   CHLORIDE mmol/L 107 104 102   CO2 mmol/L 30.0 30.0 31.0   GLUCOSE mg/dL 159* 160* 348*   BUN mg/dL 86* 76* 72*   CREATININE mg/dL 1.16 1.07 0.96     Coag:   Results from last 7 days   Lab Units 01/15/19  2133   INR  1.68*   APTT seconds 39.2*       Images:  Imaging Results (last 24 hours)     Procedure Component Value Units Date/Time    XR Chest 1 View [[id number]] Collected:  01/20/19 0835     Updated:  01/20/19 0841    Narrative:       EXAM: XR CHEST 1 VW- - 1/20/2019 8:32 AM CST     HISTORY: ET tube, chest tubes; A41.9-Sepsis, unspecified organism;  R65.21-Severe sepsis with septic shock; Z74.09-Other reduced mobility       COMPARISON: 1/19/2019.      TECHNIQUE:  1 images.  Frontal view of the chest.     FINDINGS:    Endotracheal tube tip projects over the mid trachea. Gastric tube tip  outside the field of view. Similar left chest tube at the inferior  chest.     Opacifications at the left upper and left lower lung as well as right  midlung. Large left pleural effusion. No pneumothorax. Cardiac and  mediastinal silhouette partially obscured by pulmonary opacities.  Cardiac silhouette appears prominent. No acute bony finding.          Impression:       1. Similar left greater than right opacities. Large left pleural  effusion. Left chest tube.  This report was finalized on 01/20/2019 08:38 by Dr Kelli Weinre MD.        Images Today:    CXR with persistent opacification of the left lung.    Physical Exam:   Gen.: He is sedated on the ventilator, on propofol  (Will awaken and follow commands when not sedated)  Chest: Rales and wheezes throughout.  Fairly good breath sounds on the right markedly decreased on the left. CT in place. No subcutaneous emphysema. CT appears patent.  Heart: Regular rate and rhythm  Abdomen: Globular, no guarding, no obvious tenderness.    Impression    59 yo gentleman, morbidly obese, with left empyema, respiratory failure,  left chest tube in place, following 5 day course of alteplase/pulmozyme instillation through the left CT to attempt to release any retained loculations. Patient with persistent opacification of the left lung field.      Plan:     CT chest tomorrow after completion of Alteplase/pulmozyme course.

## 2019-01-21 NOTE — THERAPY TREATMENT NOTE
Acute Care - Physical Therapy Treatment Note  [facility]     Patient Name: Alan Carlin  : 1960  MRN: [id number]  Today's Date: 2019  Onset of Illness/Injury or Date of Surgery: 01/15/19  Date of Referral to PT: 19  Referring Physician: Dr. Ott([facility])    Admit Date: 1/15/2019    Visit Dx:    ICD-10-CM ICD-9-CM   1. Shock, septic (CMS/HCC) A41.9 038.9    R65.21 785.52     995.92   2. Impaired mobility Z74.09 799.89     Patient Active Problem List   Diagnosis   • Controlled type 2 diabetes mellitus without complication, with long-term current use of insulin (CMS/HCC)   • Hyperlipidemia   • Vitamin D deficiency   • Essential hypertension   • Upper respiratory tract infection   • PNA (pneumonia)   • Acute respiratory failure with hypercapnia (CMS/HCC)       Therapy Treatment    Rehabilitation Treatment Summary     Row Name 19 1001             Treatment Time/Intention    Discipline  physical therapy assistant  -AE      Document Type  therapy note (daily note)  -AE      Subjective Information  no complaints RN just sedated  -AE      Existing Precautions/Restrictions  fall;oxygen therapy device and L/min vent femoral line chest tube  -AE      Recorded by [AE] Mary Smith, PTA 19 1013      Row Name 19 1001             Therapeutic Exercise    Upper Extremity Range of Motion (Therapeutic Exercise)  shoulder internal/external rotation, bilateral;shoulder abduction/adduction, bilateral;wrist flexion/extension, bilateral  -AE      Lower Extremity (Therapeutic Exercise)  heel slides, bilateral  -AE      Lower Extremity Range of Motion (Therapeutic Exercise)  hip abduction/adduction, bilateral;ankle dorsiflexion/plantar flexion, bilateral  -AE      Exercise Type (Therapeutic Exercise)  PROM (passive range of motion)  -AE      Position (Therapeutic Exercise)  supine  -AE      Sets/Reps (Therapeutic Exercise)  10  -AE      Recorded by [AE] Mary Smith, PTA 19 1013      Row Name  01/21/19 1001             Positioning and Restraints    Pre-Treatment Position  in bed  -AE      Post Treatment Position  bed  -AE      In Bed  fowlers;patient within staff view;with family/caregiver  -AE      Recorded by [AE] Mary Smith, PTA 01/21/19 1013        User Key  (r) = Recorded By, (t) = Taken By, (c) = Cosigned By    Initials Name Effective Dates Discipline    AE SmithMary, PTA 06/22/15 -  PT                   Physical Therapy Education     Title: PT OT SLP Therapies (In Progress)     Topic: Physical Therapy (In Progress)     Point: Home exercise program (Done)     Learning Progress Summary           Patient Acceptance, E, VU,NR by ROMULO at 1/18/2019 11:00 AM    Comment:  Educated pt/family x 2 on progression of PT POC and benefits of activity   Family Acceptance, E, VU,NR by ROMULO at 1/18/2019 11:00 AM    Comment:  Educated pt/family x 2 on progression of PT POC and benefits of activity                               User Key     Initials Effective Dates Name Provider Type Discipline    ROMULO 08/02/16 -  Juan Ramon Damon PT DPT Physical Therapist PT                PT Recommendation and Plan     Plan of Care Reviewed With: patient  Progress: improving  Outcome Summary:  He remains on the vent with sedation on. He did not follow any commands at this time. PT assisted RN with rolling left, right and scooting in bed. PT completed PROM to B UE/LE x 10 reps. PT will continue to progress strengthening, command following  Outcome Measures     Row Name 01/18/19 1100             How much help from another person do you currently need...    Turning from your back to your side while in flat bed without using bedrails?  1  -ROMULO      Moving from lying on back to sitting on the side of a flat bed without bedrails?  1  -ROMULO      Moving to and from a bed to a chair (including a wheelchair)?  1  -ROMULO      Standing up from a chair using your arms (e.g., wheelchair, bedside chair)?  1  -ROMULO      Climbing 3-5 steps with a  railing?  1  -ROMULO      To walk in hospital room?  1  -ROMULO      AM-PAC 6 Clicks Score  6  -ROMULO         Functional Assessment    Outcome Measure Options  AM-PAC 6 Clicks Basic Mobility (PT)  -ROMULO        User Key  (r) = Recorded By, (t) = Taken By, (c) = Cosigned By    Initials Name Provider Type    Juan Ramon Sarabia, PT DPT Physical Therapist         Time Calculation:   PT Charges     Row Name 01/21/19 1016             Time Calculation    Start Time  1001  -AE      Stop Time  1014  -AE      Time Calculation (min)  13 min  -AE      PT Received On  01/21/19  -AE      PT Goal Re-Cert Due Date  01/28/19  -AE         Time Calculation- PT    Total Timed Code Minutes- PT  13 minute(s)  -AE         Timed Charges    77232 - PT Therapeutic Exercise Minutes  13  -AE        User Key  (r) = Recorded By, (t) = Taken By, (c) = Cosigned By    Initials Name Provider Type    AE Mary Smith PTA Physical Therapy Assistant        Therapy Suggested Charges     Code   Minutes Charges    37826 (CPT®) Hc Pt Neuromusc Re Education Ea 15 Min      65933 (CPT®) Hc Pt Ther Proc Ea 15 Min 13 1    03041 (CPT®) Hc Gait Training Ea 15 Min      28506 (CPT®) Hc Pt Therapeutic Act Ea 15 Min      27097 (CPT®) Hc Pt Manual Therapy Ea 15 Min      37267 (CPT®) Hc Pt Iontophoresis Ea 15 Min      08839 (CPT®) Hc Pt Elec Stim Ea-Per 15 Min      06573 (CPT®) Hc Pt Ultrasound Ea 15 Min      01704 (CPT®) Hc Pt Self Care/Mgmt/Train Ea 15 Min      11147 (CPT®) Hc Pt Prosthetic (S) Train Initial Encounter, Each 15 Min      25573 (CPT®) Hc Pt Orthotic(S)/Prosthetic(S) Encounter, Each 15 Min      55050 (CPT®) Hc Orthotic(S) Mgmt/Train Initial Encounter, Each 15min      Total  13 1        Therapy Charges for Today     Code Description Service Date Service Provider Modifiers Qty    39653098655 HC PT THER PROC EA 15 MIN 1/21/2019 Mary Smith, PTA GP 1          PT G-Codes  Outcome Measure Options: AM-PAC 6 Clicks Basic Mobility (PT)  AM-PAC 6 Clicks Score:  6    Mary Smith, PTA  1/21/2019

## 2019-01-21 NOTE — PROGRESS NOTES
"[facility] PULMONARY CARE         Dr Javier Sayied   LOS: 6 days   Patient Care Team:  Francy Dumont MD as PCP - General (Family Medicine)  Erin Nolasco MA as Medical Assistant    Chief Complaint: Acute respiratory failure status post left empyema status post chest tube placement    Interval History: Notes significant change.  Remains on 80% FiO2 with PEEP of 12.5.  Patient sedated intubated.  Not following commands for me    REVIEW OF SYSTEMS:   Sedated intubated.    Ventilator/Non-Invasive Ventilation Settings (From admission, onward)    Start     Ordered    01/16/19 0824  Ventilator - AC/VC; (18); 60; 90%; Other (see comments); 7; 600  Continuous     Question Answer Comment   Vent Mode AC/VC    Breath rate  18   FiO2 60    FiO2 titrate for Sp02% =/> 90%    PEEP Other (see comments)    PEEP: 7    Tidal Volume 600        01/16/19 0824    01/16/19 0033  Ventilator - AC/VC; (22); 100; 90%; 5; (700)  Continuous,   Status:  Canceled     Question Answer Comment   Vent Mode AC/VC    Breath rate  22   FiO2 100    FiO2 titrate for Sp02% =/> 90%    PEEP 5    Tidal Volume  700       01/16/19 0033    01/16/19 0008  Ventilator - AC/VC; 100; 90%  Continuous,   Status:  Canceled     Question Answer Comment   Vent Mode AC/VC    FiO2 100    FiO2 titrate for Sp02% =/> 90%        01/16/19 0016            Vital Signs  Temp:  [98.1 °F (36.7 °C)-98.8 °F (37.1 °C)] 98.1 °F (36.7 °C)  Heart Rate:  [] 95  Resp:  [20-26] 23  BP: ()/(48-78) 100/65  FiO2 (%):  [80 %] 80 %    Intake/Output Summary (Last 24 hours) at 1/21/2019 1545  Last data filed at 1/21/2019 1534  Gross per 24 hour   Intake 2542.86 ml   Output 2905 ml   Net -362.14 ml     Flowsheet Rows      First Filed Value   Admission Height  175.3 cm (69\") Documented at 01/15/2019 1930   Admission Weight  154 kg (340 lb 4.8 oz)  (Abnormal)  Documented at 01/15/2019 1930          Physical Exam:   General Appearance:    Sedated intubated.  Not following " commands for me.  ET tube good position orally.     Lungs:    Diminished breath sounds on the left.      Heart:    Regular rhythm and normal rate, normal S1 and S2, no            murmur, no gallop, no rub, no click   Chest Wall:    No abnormalities observed   Abdomen:     Normal bowel sounds, no masses, no organomegaly, soft        non-tender, non-distended, no guarding, no rebound                tenderness   Extremities:   Moves all extremities well, 1+ edema, no cyanosis, no             redness  CNS sedated intubated      Results Review:        Results from last 7 days   Lab Units 01/21/19  0256 01/20/19  0353 01/19/19  0230   SODIUM mmol/L 145 142 144   POTASSIUM mmol/L 4.8 4.9 4.5   CHLORIDE mmol/L 107 107 104   CO2 mmol/L 33.0* 30.0 30.0   BUN mg/dL 66* 86* 76*   CREATININE mg/dL 0.80 1.16 1.07   GLUCOSE mg/dL 139* 159* 160*   CALCIUM mg/dL 8.9 8.6 8.8     Results from last 7 days   Lab Units 01/15/19  2133   TROPONIN I ng/mL 0.015     Results from last 7 days   Lab Units 01/21/19  0256 01/20/19  0353 01/19/19  0230   WBC 10*3/mm3 14.59* 16.02* 17.88*   HEMOGLOBIN g/dL 11.5* 11.3* 12.1*   HEMATOCRIT % 36.5* 35.5* 37.9*   PLATELETS 10*3/mm3 294 268 274     Results from last 7 days   Lab Units 01/21/19  0256 01/15/19  2133   INR  1.41* 1.68*   APTT seconds  --  39.2*         Results from last 7 days   Lab Units 01/21/19  0256   MAGNESIUM mg/dL 2.8*         Results from last 7 days   Lab Units 01/21/19  0015   PH, ARTERIAL pH units 7.385   PO2 ART mm Hg 92.6   PCO2, ARTERIAL mm Hg 55.2*   HCO3 ART mmol/L 33.0*       I reviewed the patient's new clinical results.  I personally viewed and interpreted the patient's CXR        Medication Review:     custom medication builder 50 mL Intrapleural BID   enoxaparin 40 mg Subcutaneous Q24H   famotidine 20 mg Intravenous BID   guaiFENesin 400 mg Oral Q8H   ipratropium-albuterol 3 mL Nebulization Q4H - RT   piperacillin-tazobactam 4.5 g Intravenous Q8H   polyethylene glycol  17 g Oral Once   sodium chloride 3 mL Intravenous Q12H   sodium polystyrene 15 g Oral Once         insulin regular infusion 1 unit/mL 1-20 Units/hr Last Rate: 2 Units/hr (01/21/19 0659)   norepinephrine 0.02-0.3 mcg/kg/min Last Rate: Stopped (01/17/19 1730)   propofol 5-50 mcg/kg/min Last Rate: 30 mcg/kg/min (01/21/19 1434)       ASSESSMENT:   1. Acute hypoxemic respiratory fair requiring mechanical ventilation  2. Empyema, left status post chest tube placement with multiple unclogging of the chest tube  3. Type II diabetes  4. Thyroid disease  5. Acute kidney injury  6. Hypotension requiring pressors  7. Sepsis           PLAN:  Continue current vent support with 80% FiO2 and PEEP 12.5.  Wean FiO2 as tolerated.  Once FiO2 to 60% May start decreasing the PEEP.  ABGs stable with FiO2 74.  Patient is to be sedated with rales -1 to -2  Antibiotics to be continued.  Adjust based on cultures  We will wean off oxygen as patient tolerates  Plans per thoracic surgery noted to repeat CT chest likely in the morning and decide further course of action.  Continue supportive care  Tube feeds  Discussed with family at bedside      Concepcion Harris MD  01/21/19  3:45 PM

## 2019-01-21 NOTE — PROGRESS NOTES
[facility] Medicine Services  INPATIENT PROGRESS NOTE    Patient Name: Alan Carlin  Date of Admission: 1/15/2019  Today's Date: 01/21/19  Length of Stay: 6  Primary Care Physician: Francy Dumont MD    Subjective   Chief Complaint: follow-up respiratory failure  HPI   Patient is intubated and sedated on propofol; opens eyes and follows commands promptly.  No acute events reported from overnight.  Remains on tube feeds currently at 40 mL per hour.  Has been on an insulin drip for blood sugar control.  Spouse at bedside this morning.    Review of Systems     All pertinent negatives and positives are as above. All other systems have been reviewed and are negative unless otherwise stated.     Objective    Temp:  [98.1 °F (36.7 °C)-98.5 °F (36.9 °C)] 98.1 °F (36.7 °C)  Heart Rate:  [] 97  Resp:  [18-26] 26  BP: ()/(44-79) 99/62  FiO2 (%):  [80 %] 80 %  Physical Exam   Constitutional: No distress.   Sedated on propofol; on mechanical ventilation   HENT:   Head: Normocephalic.   Mouth/Throat: No oropharyngeal exudate (ET tube in place).   Eyes: Pupils are equal, round, and reactive to light. No scleral icterus.   Opens eyes to verbal and tactile stimuli; on sedation   Neck: No tracheal deviation present.   Cardiovascular: Normal rate and regular rhythm.   Pulmonary/Chest: Effort normal. He has rales.   On 80% Fi02 and 12.5 PEEP; BSs more diminished on left as compared to right; chest tube in place   Abdominal: Soft. He exhibits no distension (slightly protuberant). There is no tenderness. There is no rebound.   Genitourinary:   Genitourinary Comments: Hammonds in place   Musculoskeletal: He exhibits edema.   Neurological: He is alert.   On propofol but opens eyes and promptly follows commands    Skin: Skin is warm and dry. He is not diaphoretic.   Vitals reviewed.      Results Review:  I have reviewed the labs, radiology results, and diagnostic studies.    Laboratory Data:    Results from last 7 days   Lab Units 01/21/19  0256 01/20/19  0353 01/19/19  0230   WBC 10*3/mm3 14.59* 16.02* 17.88*   HEMOGLOBIN g/dL 11.5* 11.3* 12.1*   HEMATOCRIT % 36.5* 35.5* 37.9*   PLATELETS 10*3/mm3 294 268 274        Results from last 7 days   Lab Units 01/21/19  0256 01/20/19  0353 01/19/19  0230 01/18/19  0418  01/17/19  0322   SODIUM mmol/L 145 142 144 140   < > 139   POTASSIUM mmol/L 4.8 4.9 4.5 5.4*   < > 5.6*   CHLORIDE mmol/L 107 107 104 102   < > 99   CO2 mmol/L 33.0* 30.0 30.0 31.0   < > 30.0   BUN mg/dL 66* 86* 76* 72*   < > 67*   CREATININE mg/dL 0.80 1.16 1.07 0.96   < > 0.93   CALCIUM mg/dL 8.9 8.6 8.8 8.6   < > 8.5   BILIRUBIN mg/dL  --   --  0.3 0.4  --  0.5   ALK PHOS U/L  --   --  74 77  --  105   ALT (SGPT) U/L  --   --  30 34  --  28   AST (SGOT) U/L  --   --  42 42  --  31   GLUCOSE mg/dL 139* 159* 160* 348*   < > 306*    < > = values in this interval not displayed.       Culture Data:   Blood Culture   Date Value Ref Range Status   01/16/2019 No growth at 5 days  Final   01/16/2019 No growth at 5 days  Final     MRSA SCREEN CX   Date Value Ref Range Status   01/16/2019   Final    No Methicillin Resistant Staphylococcus aureus isolated     Respiratory Culture   Date Value Ref Range Status   01/15/2019 Rare Normal Respiratory Ana Lilia  Final   01/15/2019 Rare Candida albicans (A)  Final       Radiology Data:   Imaging Results (last 24 hours)     Procedure Component Value Units Date/Time    XR Chest 1 View [[id number]] Collected:  01/21/19 0724     Updated:  01/21/19 0728    Narrative:       Frontal supine radiograph of the chest 1/21/2019 4:45 AM CST     History: chest tube; A41.9-Sepsis, unspecified organism; R65.21-Severe  sepsis with septic shock; Z74.09-Other reduced mobility     Comparison: Chest x-ray dated 1/20/2019.      Findings:   Lines and tubes are stable in position. No new opacities or  pneumothoraces are visualized in the chest. The cardiomediastinal  silhouette and  pulmonary vascularity are unchanged.       No acute osseous or soft tissue abnormality is noted.        Impression:       Impression:   1.   No significant interval change since previous exam.        This report was finalized on 01/21/2019 07:25 by Dr. Shanice Tabor MD.    XR Chest 1 View [[id number]] Collected:  01/20/19 0835     Updated:  01/20/19 0841    Narrative:       EXAM: XR CHEST 1 VW- - 1/20/2019 8:32 AM CST     HISTORY: ET tube, chest tubes; A41.9-Sepsis, unspecified organism;  R65.21-Severe sepsis with septic shock; Z74.09-Other reduced mobility       COMPARISON: 1/19/2019.      TECHNIQUE:  1 images.  Frontal view of the chest.     FINDINGS:    Endotracheal tube tip projects over the mid trachea. Gastric tube tip  outside the field of view. Similar left chest tube at the inferior  chest.     Opacifications at the left upper and left lower lung as well as right  midlung. Large left pleural effusion. No pneumothorax. Cardiac and  mediastinal silhouette partially obscured by pulmonary opacities.  Cardiac silhouette appears prominent. No acute bony finding.          Impression:       1. Similar left greater than right opacities. Large left pleural  effusion. Left chest tube.  This report was finalized on 01/20/2019 08:38 by Dr Kelli Weiner MD.          I have reviewed the patient's current medications.     Assessment/Plan     Active Hospital Problems    Diagnosis   • PNA (pneumonia)   • Acute respiratory failure with hypercapnia (CMS/HCC)   • Controlled type 2 diabetes mellitus without complication, with long-term current use of insulin (CMS/HCC)     1) Acute hypoxic and acute on chronic hypercapnia respiratory failure due to left-sided pleural effusion (suspected empyema) and right lower lobe community-acquired pneumonia   2) Septic shock due to community-acquired pneumonia, improved  3) Acute kidney injury, resolved  4) Morbid obesity   5) Normocytic anemia  6) Suppressed TSH, normal Free T4  7) Type 2  DM complicated by hyperglycemia  8) Hyperkalemia  9) Constipation, resolved   10) Pulmonary edema    Plan:  1.  Patient receiving Alteplase and Pulmozyme via chest tube twice daily  2.  Was on Levaquin and Cefepime (received 4 days of tx); now on IV Zosyn - day 3  3.  CT Surg plans for repeat CT chest tomorrow  4.  Appreciate Pulmonary input  5.  Follow-up cultures  6.  Lovenox/Pepcid PPx  7.  Nebs scheduled  8.  Last received Lasix on 1/19  9.  CXR reviewed; largely unchanged  10.  TFs currently at 40ml/hr  11.  Insulin gtt per protocol for tight control of BSs  12.  ICU care  13.  Updated spouse at bedside this AM      Tim Grey MD   01/21/19   8:01 AM

## 2019-01-21 NOTE — PLAN OF CARE
Problem: Patient Care Overview  Goal: Plan of Care Review   01/21/19 9558   OTHER   Outcome Summary Patient rested throughout night. Alert and oriented off sedation. Follows commands. ETCO2 elevated. At approximately 0030 abg's obtained. No significant change in co2 on abg's. Minimal output from chest tube. No bm during shift. Will continue to monitor.   Coping/Psychosocial   Plan of Care Reviewed With spouse   Plan of Care Review   Progress no change

## 2019-01-21 NOTE — PROGRESS NOTES
"Patient: Alan Carlin  : 1960     Procedure:     Procedure Date:     POD: * No surgery found *      Subjective   Not much change.  Still intubated and when propofol paused he awakens and follows commands.       Objective   Visit Vitals  /65 (Patient Position: Lying)   Pulse 91   Temp 98.3 °F (36.8 °C) (Axillary)   Resp 24   Ht 175.3 cm (69\")   Wt (!) 155 kg (341 lb 0.8 oz)   SpO2 95%   BMI 50.36 kg/m²       Intake/Output Summary (Last 24 hours) at 2019 0542  Last data filed at 2019 0400  Gross per 24 hour   Intake 2191.25 ml   Output 2800 ml   Net -608.75 ml                                        Lab Results:    CBC:   Results from last 7 days   Lab Units 19  0256 19  0353 19  0230   WBC 10*3/mm3 14.59* 16.02* 17.88*   HEMATOCRIT % 36.5* 35.5* 37.9*   PLATELETS 10*3/mm3 294 268 274     BMP:   Results from last 7 days   Lab Units 19  0256 19  0353 19  0230   SODIUM mmol/L 145 142 144   POTASSIUM mmol/L 4.8 4.9 4.5   CHLORIDE mmol/L 107 107 104   CO2 mmol/L 33.0* 30.0 30.0   GLUCOSE mg/dL 139* 159* 160*   BUN mg/dL 66* 86* 76*   CREATININE mg/dL 0.80 1.16 1.07     Coag:   Results from last 7 days   Lab Units 19  0256 01/15/19  2133   INR  1.41* 1.68*   APTT seconds  --  39.2*       Images:  Imaging Results (last 24 hours)     Procedure Component Value Units Date/Time    XR Chest 1 View [[id number]] Updated:  19    XR Chest 1 View [[id number]] Collected:  19     Updated:  19    Narrative:       EXAM: XR CHEST 1 VW- - 2019 8:32 AM CST     HISTORY: ET tube, chest tubes; A41.9-Sepsis, unspecified organism;  R65.21-Severe sepsis with septic shock; Z74.09-Other reduced mobility       COMPARISON: 2019.      TECHNIQUE:  1 images.  Frontal view of the chest.     FINDINGS:    Endotracheal tube tip projects over the mid trachea. Gastric tube tip  outside the field of view. Similar left chest tube at the " inferior  chest.     Opacifications at the left upper and left lower lung as well as right  midlung. Large left pleural effusion. No pneumothorax. Cardiac and  mediastinal silhouette partially obscured by pulmonary opacities.  Cardiac silhouette appears prominent. No acute bony finding.          Impression:       1. Similar left greater than right opacities. Large left pleural  effusion. Left chest tube.  This report was finalized on 01/20/2019 08:38 by Dr Kelli Weiner MD.        Images Today: Chest x-ray today looks about the same.  There has been some improvement but still large amount of left lung opacification     Physical Exam:   Gen.: He is still sedated on the ventilator.  He is able to follow commands when propofol positive.  Chest: Rales and wheezes throughout.  Markedly decreased breath sounds on the left  Heart: Regular rate and rhythm    Impression: Continues very ill.  Unable to wean from ventilator.  Left lung possibly slightly better but still continues with opacification.  Right lung better aeration but still what appears to be pneumonia.  White count has decreased and is now 14,000.  Hematocrit is stable.  Creatinine remains normal.  Glucose is under somewhat better control.  Continue Zosyn.    Plan: Continue Activase/Pulmozyme today.  Check CT scan tomorrow.  Patient remains very ill and not a operative candidate presently.    Berlin Oglesby MD  01/21/19  5:42 AM

## 2019-01-21 NOTE — PLAN OF CARE
Problem: Skin Injury Risk (Adult)  Goal: Identify Related Risk Factors and Signs and Symptoms  Outcome: Ongoing (interventions implemented as appropriate)    Goal: Skin Health and Integrity  Outcome: Ongoing (interventions implemented as appropriate)      Problem: Fall Risk (Adult)  Goal: Identify Related Risk Factors and Signs and Symptoms  Outcome: Ongoing (interventions implemented as appropriate)    Goal: Absence of Fall  Outcome: Ongoing (interventions implemented as appropriate)      Problem: Patient Care Overview  Goal: Individualization and Mutuality  Outcome: Ongoing (interventions implemented as appropriate)    Goal: Discharge Needs Assessment  Outcome: Ongoing (interventions implemented as appropriate)    Goal: Interprofessional Rounds/Family Conf  Outcome: Ongoing (interventions implemented as appropriate)      Problem: Nutrition, Enteral (Adult)  Goal: Signs and Symptoms of Listed Potential Problems Will be Absent, Minimized or Managed (Nutrition, Enteral)  Outcome: Ongoing (interventions implemented as appropriate)      Problem: Ventilation, Mechanical Invasive (Adult)  Goal: Signs and Symptoms of Listed Potential Problems Will be Absent, Minimized or Managed (Ventilation, Mechanical Invasive)  Outcome: Ongoing (interventions implemented as appropriate)      Problem: Sepsis/Septic Shock (Adult)  Goal: Signs and Symptoms of Listed Potential Problems Will be Absent, Minimized or Managed (Sepsis/Septic Shock)  Outcome: Ongoing (interventions implemented as appropriate)      Problem: Pneumonia (Adult)  Goal: Signs and Symptoms of Listed Potential Problems Will be Absent, Minimized or Managed (Pneumonia)  Outcome: Ongoing (interventions implemented as appropriate)

## 2019-01-22 NOTE — PROGRESS NOTES
At approx 0556, we left ICU with Mr. Carlin to go to CT. Patient's ETCO2 was 41-46mmhg throughout transport and procedure. I also monitored patient's saturation. Saturations were 90-94% throughout transport and procedure. I placed the patient on transport ventilator when leaving ICU. Vt was placed on 650, fio2 100%, with a peep of 12.5cm. Patient's RR ranged 20-24bpm throughout transport and procedure. Patient's landmark for ET was at 25cm pre transport, 25cm while transporting patient, 25cm throughout procedure, post procedure and upon returning to room. Patient was placed back on previous ventilator settings upon returning to Icu room. Patient tolerated transport and procedure very well.

## 2019-01-22 NOTE — PLAN OF CARE
Problem: Skin Injury Risk (Adult)  Goal: Identify Related Risk Factors and Signs and Symptoms  Outcome: Ongoing (interventions implemented as appropriate)      Problem: Fall Risk (Adult)  Goal: Identify Related Risk Factors and Signs and Symptoms  Outcome: Ongoing (interventions implemented as appropriate)      Problem: Patient Care Overview  Goal: Plan of Care Review  Outcome: Ongoing (interventions implemented as appropriate)      Problem: Nutrition, Enteral (Adult)  Goal: Signs and Symptoms of Listed Potential Problems Will be Absent, Minimized or Managed (Nutrition, Enteral)  Outcome: Ongoing (interventions implemented as appropriate)      Problem: Ventilation, Mechanical Invasive (Adult)  Goal: Signs and Symptoms of Listed Potential Problems Will be Absent, Minimized or Managed (Ventilation, Mechanical Invasive)  Outcome: Ongoing (interventions implemented as appropriate)      Problem: Sepsis/Septic Shock (Adult)  Goal: Signs and Symptoms of Listed Potential Problems Will be Absent, Minimized or Managed (Sepsis/Septic Shock)  Outcome: Ongoing (interventions implemented as appropriate)      Problem: Pneumonia (Adult)  Goal: Signs and Symptoms of Listed Potential Problems Will be Absent, Minimized or Managed (Pneumonia)  Outcome: Ongoing (interventions implemented as appropriate)

## 2019-01-22 NOTE — PROGRESS NOTES
"Patient: Alan Carlin  : 1960     Procedure:     Procedure Date:     POD: * No surgery found *      Subjective   Really no change.Patient still sedated requiring ventilator.  White count is slightly elevated over yesterday     Objective   Visit Vitals  /65   Pulse 98   Temp 98.1 °F (36.7 °C) (Axillary)   Resp 25   Ht 175.3 cm (69\")   Wt (!) 155 kg (341 lb 0.8 oz)   SpO2 91%   BMI 50.36 kg/m²       Intake/Output Summary (Last 24 hours) at 2019 0507  Last data filed at 2019 1600  Gross per 24 hour   Intake 978 ml   Output 1300 ml   Net -322 ml                                        Lab Results:    CBC:   Results from last 7 days   Lab Units 19  0257 19  0256 19  0353   WBC 10*3/mm3 16.47* 14.59* 16.02*   HEMATOCRIT % 38.0* 36.5* 35.5*   PLATELETS 10*3/mm3 312 294 268     BMP:   Results from last 7 days   Lab Units 19  0257 19  0256 19  0353   SODIUM mmol/L 146* 145 142   POTASSIUM mmol/L 4.7 4.8 4.9   CHLORIDE mmol/L 105 107 107   CO2 mmol/L 32.0* 33.0* 30.0   GLUCOSE mg/dL 132* 139* 159*   BUN mg/dL 41* 66* 86*   CREATININE mg/dL 0.75 0.80 1.16     Coag:   Results from last 7 days   Lab Units 19  0256 01/15/19  2133   INR  1.41* 1.68*   APTT seconds  --  39.2*       Images:  Imaging Results (last 24 hours)     Procedure Component Value Units Date/Time    XR Chest 1 View [[id number]] Updated:  194    XR Chest 1 View [[id number]] Collected:  19     Updated:  19    Narrative:       Frontal supine radiograph of the chest 2019 4:45 AM CST     History: chest tube; A41.9-Sepsis, unspecified organism; R65.21-Severe  sepsis with septic shock; Z74.09-Other reduced mobility     Comparison: Chest x-ray dated 2019.      Findings:   Lines and tubes are stable in position. No new opacities or  pneumothoraces are visualized in the chest. The cardiomediastinal  silhouette and pulmonary vascularity are unchanged.       No acute " osseous or soft tissue abnormality is noted.        Impression:       Impression:   1.   No significant interval change since previous exam.        This report was finalized on 01/21/2019 07:25 by Dr. Shanice Tabor MD.        Images Today: Fairly the same.  Still large amount of opacity in left lung.  Thoracostomy tube in place     Physical Exam:   Gen.: Sedated on ventilator  Chest: Decreased breath sounds bilaterally especially on the left.  Rales and wheezes throughout  Heart: Regular rate and rhythm    Impression: Continues respiratory failure with left empyema.    Plan: CT scan today to assess progress of Pulmozyme.    Berlin Oglesby MD  01/22/19  5:07 AM

## 2019-01-22 NOTE — PLAN OF CARE
Problem: Patient Care Overview  Goal: Plan of Care Review  Outcome: Ongoing (interventions implemented as appropriate)   01/22/19 0520   OTHER   Outcome Summary Patient remains on vent on diprivan. Alert and follows command. Remains on insulin drip. BS stable. Vitals signs stable. Chest tube minimal output. No bm noted. UOP wnl. Patient taken to ct per Dr. Oglesby order.    Coping/Psychosocial   Plan of Care Reviewed With patient;spouse       Problem: Ventilation, Mechanical Invasive (Adult)  Goal: Signs and Symptoms of Listed Potential Problems Will be Absent, Minimized or Managed (Ventilation, Mechanical Invasive)   01/22/19 0751   Goal/Outcome Evaluation   Problems Assessed (Mechanical Ventilation, Invasive) all   Problems Present ([facility], [facility]vasive) immobility;inability to wean

## 2019-01-22 NOTE — PROGRESS NOTES
PULMONARY AND CRITICAL CARE PROGRESS NOTE - [facility]    Patient: Alan Carlin    1960    MR# [id number]    Acct# [id number]  01/22/19   8:26 AM  Referring Provider: Tim Grey MD    Chief Complaint: Mechanically ventilated    Interval history: He remains intubated and sedated today.  He continues to require a lot of ventilator support with PEEP at 12.5 and FiO2 80%, with sats at 92%.  His daughter is at bedside this morning and has been updated.  The patient has had a follow-up CT of the chest this morning that is awaiting review from Dr. Oglesby.  Nutrition is being addressed.    Meds:    enoxaparin 40 mg Subcutaneous Q24H   famotidine 20 mg Intravenous BID   guaiFENesin 400 mg Oral Q8H   ipratropium-albuterol 3 mL Nebulization Q4H - RT   piperacillin-tazobactam 4.5 g Intravenous Q8H   polyethylene glycol 17 g Oral Once   sodium chloride 3 mL Intravenous Q12H   sodium polystyrene 15 g Oral Once       insulin regular infusion 1 unit/mL 1-20 Units/hr Last Rate: 1 Units/hr (01/22/19 0111)   norepinephrine 0.02-0.3 mcg/kg/min Last Rate: Stopped (01/17/19 1730)   propofol 5-50 mcg/kg/min Last Rate: 35 mcg/kg/min (01/22/19 0639)     Review of Systems:     Cannot obtain due to mechanical ventilation.  The patient notably is critically ill and connected to a ventilator.  As such patient cannot communicate and provide any history whatsoever, including any history of present illness or interval history since arrival or review of systems. The interested reviewer may note this fact, as an attempt has been made at collecting and documenting these portions of the patient history, but this information is unobtainable despite attempted review and therefore cannot be documented at this time.     Ventilator Settings:     Vt (Set, L): 0.6 L  Resp Rate (Set): 18  Pressure Support (cm H2O): 0 cm H20  FiO2 (%): 80 %  PEEP/CPAP (cm H2O): 12.5 cm H20  Minute Ventilation (L/min) (Obs): 13.2  L/min  Resp Rate (Observed) Vent: 29  I:E Ratio (Set): 1:1.40  I:E Ratio (Obs): 1:1.8  PIP Observed (cm H2O): 31 cm H2O     Physical Exam:  Temp:  [98.1 °F (36.7 °C)-99.2 °F (37.3 °C)] 99.2 °F (37.3 °C)  Heart Rate:  [] 102  Resp:  [19-30] 21  BP: ()/(51-79) 122/69  FiO2 (%):  [80 %] 80 %    Intake/Output Summary (Last 24 hours) at 1/22/2019 0826  Last data filed at 1/22/2019 0420  Gross per 24 hour   Intake 1502 ml   Output 2310 ml   Net -808 ml     SpO2 Percentage    01/22/19 0706 01/22/19 0720 01/22/19 0800   SpO2: 93% 95% 92%      Physical Exam:    Constitutional: He appears well-developed and well-nourished. No distress. Sedated and intubated.  Obese.  HENT: ET tube in place, OG with nutrition infusing.  Head: Normocephalic and atraumatic.   Eyes: Pupils are equal, round, and reactive to light. Right eye exhibits no discharge. Left eye exhibits no discharge.   Neck: Normal range of motion. Neck supple.   thick   Cardiovascular: Normal rate and regular rhythm.   No murmur heard.  Pulmonary/Chest: He has decreased breath sounds in the left upper field and the left lower field.  bilateral rales.     left-sided chest tube in place  Abdominal: Soft. Bowel sounds are normal.  Abdomen is obese.    Musculoskeletal: He exhibits edema. He exhibits no deformity.   Neurological:   Intubated and sedated    Skin: Skin is warm and dry. No rash noted. He is not diaphoretic. No erythema.   Psychiatric: He is sedated.  Nursing note and vitals reviewed.        Results from last 7 days   Lab Units 01/22/19  0257 01/21/19  0256 01/20/19  0353   WBC 10*3/mm3 16.47* 14.59* 16.02*   HEMOGLOBIN g/dL 11.6* 11.5* 11.3*   PLATELETS 10*3/mm3 312 294 268     Results from last 7 days   Lab Units 01/22/19  0257 01/21/19  0256 01/20/19  0353   SODIUM mmol/L 146* 145 142   POTASSIUM mmol/L 4.7 4.8 4.9   BUN mg/dL 41* 66* 86*   CREATININE mg/dL 0.75 0.80 1.16     Results from last 7 days   Lab Units 01/22/19 0225 01/21/19  0864  01/21/19  0015   PH, ARTERIAL pH units 7.466* 7.446 7.385   PCO2, ARTERIAL mm Hg 47.0* 49.2* 55.2*   PO2 ART mm Hg 66.8* 85.8 92.6   FIO2 % 80 80 80     Blood Culture   Date Value Ref Range Status   01/16/2019 No growth at 24 hours  Preliminary   01/16/2019 No growth at 24 hours  Preliminary     Respiratory Culture   Date Value Ref Range Status   01/15/2019 Rare Normal Respiratory Ana Lilia  Final   01/15/2019 Rare Candida albicans (A)  Final     Recent films:  Ct Chest Without Contrast    Result Date: 1/22/2019  1. Complete left lower lobe collapse with partial left upper lobe collapse, in part due to the adjacent multiloculated left pleural effusion/empyema. A left chest tube is seen in the left lung base, with the largest remaining component of the effusion/empyema now seen at the left apex. 2. Dense consolidation in the dependent portions of the right upper and right lower lobes with additional inflammatory nodularity throughout all 3 lobes on the right. The inflammatory nodularity in particular raises suspicion for a developing infectious process in right lung. 3. Endotracheal tube and enteric tube appear in good position. This report was finalized on 01/22/2019 07:53 by Dr Handy Avery, .    Xr Chest 1 View    Result Date: 1/22/2019  1. Diffuse increased consolidation throughout the right lung from the prior exams, suspicious for developing infectious infiltrate, edema or perhaps atelectasis. 2. Appearance of the left lung is stable. This report was finalized on 01/22/2019 07:44 by Dr Handy Avery, .    Xr Chest 1 View    Result Date: 1/21/2019  Impression: 1.   No significant interval change since previous exam.   This report was finalized on 01/21/2019 07:25 by Dr. Shanice Tabor MD.    Xr Chest 1 View    Result Date: 1/20/2019  1. Similar left greater than right opacities. Large left pleural effusion. Left chest tube. This report was finalized on 01/20/2019 08:38 by Dr Kelli Weiner MD.    Films reviewed  personally by me.  My interpretation: Endotracheal tube in position, dense consolidation in left lung with left sided chest tube in place, worsening consolidation throughout the right lung.    Pulmonary Assessment:    1. Acute hypoxemic respiratory fair requiring mechanical ventilation  2. Empyema, left  3. Type II diabetes  4. Thyroid disease  5. Acute kidney injury  6. Hypotension requiring pressors  7. Sepsis  8. Leukocytosis    Recommend:     · No vent changes today, he continues to require high levels of support with PEEP at 12.5 and FiO2 at 80%.  · Chest CT done earlier today with no improvement on review.  · Continue nebs scheduled  · Chest x-ray and ABG daily while on the ventilator  · Stress ulcer and DVT prophylaxis  · Currently on Zosyn day #4  · Altepase and Pulmozyme per CT surgery   · Daughter updated at the bedside.  · He remains critically ill.    Electronically signed by LUIS E Huerta on 1/22/2019 at 8:26 AM       He remains on the ventilator.  His CT scan was done this morning which does show extensive loculated fluid on the left side.  He remains on the ventilator sedated mechanically ventilated.  Family is in the room.  We discussed his status.  He is not ready to extubate.  Debating whether he could or should undergo VATS procedure.  Risk would be extremely high given the high PEEP that he is needing as well as the high FiO2.  Unable to reduce any of these levels of support for right now.  Remains very critically ill.    I have seen and examined patient personally, performing a face-to-face diagnostic evaluation with plan of care reviewed and developed with APRN and nursing staff. I have addended and/or modified the above history of present illness, physical examination, and assessment and plan to reflect my findings and impressions. Essential elements of the care plan were discussed with APRN above.  Agree with findings and assessment/plan as documented above.    Electronically  signed by Damion Wright MD, on 1/22/2019, 12:46 PM    EMR Dragon/Transcription disclaimer: Much of this encounter note is an electronic transcription/translation of spoken language to printed text. The electronic translation of spoken language may permit erroneous, or at times, nonsensical words or phrases to be inadvertently transcribed; although I have reviewed the note for such errors, some may still exist.

## 2019-01-22 NOTE — PROGRESS NOTES
Continued Stay Note   Janeth     Patient Name: Alan Carlin  MRN: [id number]  Today's Date: 1/22/2019    Admit Date: 1/15/2019    Discharge Plan     Row Name 01/22/19 1115       Plan    Plan  LTAC likely    Patient/Family in Agreement with Plan  yes    Plan Comments  Patient has LTAC referral pending.  Patient's insurance will require precert.  CT surgery currently following.  Will await plan of care before LTAC initiates precert.        Discharge Codes    No documentation.             DARLYN Ashraf

## 2019-01-22 NOTE — PROGRESS NOTES
[facility] Medicine Services  INPATIENT PROGRESS NOTE    Patient Name: Alan Carlin  Date of Admission: 1/15/2019  Today's Date: 01/22/19  Length of Stay: 7  Primary Care Physician: Francy Dumont MD    Subjective   Chief Complaint: follow-up respiratory failure  HPI   No acute events reported from overnight.  Patient went down to radiology to get CT of the chest completed earlier this morning.  Patient is tolerating tube feeds with minimal residuals at this time.  Per nursing, when sedation is held patient continues to follow commands appropriately.  Family remains at bedside; daughter is present this morning.    Review of Systems     All pertinent negatives and positives are as above. All other systems have been reviewed and are negative unless otherwise stated.     Objective    Temp:  [98.1 °F (36.7 °C)-99.1 °F (37.3 °C)] 98.2 °F (36.8 °C)  Heart Rate:  [] 92  Resp:  [19-30] 21  BP: ()/(51-79) 102/60  FiO2 (%):  [80 %] 80 %  Physical Exam   Constitutional: No distress.   Sedated on propofol; on mechanical ventilation   HENT:   Head: Normocephalic.   Mouth/Throat: No oropharyngeal exudate (ET tube in place).   Eyes: Pupils are equal, round, and reactive to light. No scleral icterus.   Neck: No tracheal deviation present.   Cardiovascular: Normal rate and regular rhythm.   Pulmonary/Chest: Effort normal. He has rales.   On 80% Fi02 and 12 PEEP; BSs more diminished on left as compared to right; chest tube in place on the left   Abdominal: Soft. He exhibits no distension. There is no tenderness. There is no rebound.   Genitourinary:   Genitourinary Comments: Hammonds in place   Musculoskeletal: He exhibits edema.   LUE PICC; peripheral IV in right hand   Neurological: He is alert.   On propofol for sedation   Skin: Skin is warm and dry. He is not diaphoretic.   Vitals reviewed.      Results Review:  I have reviewed the labs, radiology results, and diagnostic  studies.    Laboratory Data:   Results from last 7 days   Lab Units 01/22/19  0257 01/21/19  0256 01/20/19  0353   WBC 10*3/mm3 16.47* 14.59* 16.02*   HEMOGLOBIN g/dL 11.6* 11.5* 11.3*   HEMATOCRIT % 38.0* 36.5* 35.5*   PLATELETS 10*3/mm3 312 294 268        Results from last 7 days   Lab Units 01/22/19  0257 01/21/19  0256 01/20/19  0353 01/19/19  0230 01/18/19  0418  01/17/19  0322   SODIUM mmol/L 146* 145 142 144 140   < > 139   POTASSIUM mmol/L 4.7 4.8 4.9 4.5 5.4*   < > 5.6*   CHLORIDE mmol/L 105 107 107 104 102   < > 99   CO2 mmol/L 32.0* 33.0* 30.0 30.0 31.0   < > 30.0   BUN mg/dL 41* 66* 86* 76* 72*   < > 67*   CREATININE mg/dL 0.75 0.80 1.16 1.07 0.96   < > 0.93   CALCIUM mg/dL 8.9 8.9 8.6 8.8 8.6   < > 8.5   BILIRUBIN mg/dL  --   --   --  0.3 0.4  --  0.5   ALK PHOS U/L  --   --   --  74 77  --  105   ALT (SGPT) U/L  --   --   --  30 34  --  28   AST (SGOT) U/L  --   --   --  42 42  --  31   GLUCOSE mg/dL 132* 139* 159* 160* 348*   < > 306*    < > = values in this interval not displayed.       Culture Data:   Blood Culture   Date Value Ref Range Status   01/16/2019 No growth at 5 days  Final   01/16/2019 No growth at 5 days  Final     MRSA SCREEN CX   Date Value Ref Range Status   01/16/2019   Final    No Methicillin Resistant Staphylococcus aureus isolated     Respiratory Culture   Date Value Ref Range Status   01/15/2019 Rare Normal Respiratory Ana Lilia  Final   01/15/2019 Rare Candida albicans (A)  Final       Radiology Data:   Imaging Results (last 24 hours)     Procedure Component Value Units Date/Time    CT Chest Without Contrast [[id number]] Collected:  01/22/19 0745     Updated:  01/22/19 0756    Narrative:       CT CHEST WO CONTRAST- 1/22/2019 6:02 AM CST     HISTORY: Pleural effusion; A41.9-Sepsis, unspecified organism;  R65.21-Severe sepsis with septic shock; Z74.09-Other reduced mobility     COMPARISON: Multiple prior chest x-rays, the most recent which is dated  1/22/2019     DOSE LENGTH PRODUCT:  538 mGy cm. Automated exposure control was also  utilized to decrease patient radiation dose.     TECHNIQUE: Serial helical tomographic images of the chest were acquired.  Multiplanar reformatted images were provided for review.     FINDINGS:  The imaged portion of the neck and thyroid gland is unremarkable.      A left-sided pleural drain is identified in the left lung base. There is  essentially complete collapse of the left lower lobe with partial left  upper lobe collapse. There is a multiloculated effusion in the left  pleural space, the largest component of which is seen along the  posterior costal pleura in the left apex. Dependent peripheral  consolidations are seen in the right lower lobe and right upper lobe as  well and there is a small inflammatory nodularity throughout the right  upper, middle and lower lobes. No right-sided pleural effusion. There is  an endotracheal tube which appears in good position. Airways are  otherwise clear.     The heart is normal in size. Thoracic aorta is normal in caliber.  Central pulmonary arteries are mildly dilated, with the main pulmonary  artery measuring up to 34 mm in greatest axial diameter. No pericardial  effusion. There is moderate coronary artery atheromatous calcification.  No enlarged axillary or mediastinal lymph nodes are present.      No acute findings are seen in the bones or surrounding soft tissues.     No acute findings are seen in the visualized portion of the upper  abdomen. Cholelithiasis. Enteric tube is in good position.       Impression:       1. Complete left lower lobe collapse with partial left upper lobe  collapse, in part due to the adjacent multiloculated left pleural  effusion/empyema. A left chest tube is seen in the left lung base, with  the largest remaining component of the effusion/empyema now seen at the  left apex.  2. Dense consolidation in the dependent portions of the right upper and  right lower lobes with additional  inflammatory nodularity throughout all  3 lobes on the right. The inflammatory nodularity in particular raises  suspicion for a developing infectious process in right lung.  3. Endotracheal tube and enteric tube appear in good position.  This report was finalized on 01/22/2019 07:53 by Dr Handy Avery, .    XR Chest 1 View [[id number]] Collected:  01/22/19 0740     Updated:  01/22/19 0753    Narrative:       XR CHEST 1 VW- 1/22/2019 3:25 AM CST     HISTORY: chest tube, vent; A41.9-Sepsis, unspecified organism;  R65.21-Severe sepsis with septic shock; Z74.09-Other reduced mobility       COMPARISON: 1/21/2019.     FINDINGS:      Endotracheal tube is in good position. There is a left chest tube in the  left lung base, paralleling the left hemidiaphragm. There continues to  be opacification of the left lung base with obscuration of the left  hemidiaphragm and a fluid meniscus along the left chest wall. There is  dense opacification also of the left apex though it appears to be a  loculated effusion at the apex. There is increasing consolidation  throughout the right lung is well, affecting both the upper and lower  lung fields. There may be a trace effusion in the pleural space at the  right apex. An enteric tube is identified, the tip being difficult to  see on this exam.     The osseous structures and surrounding soft tissues demonstrate no acute  abnormality.       Impression:       1. Diffuse increased consolidation throughout the right lung from the  prior exams, suspicious for developing infectious infiltrate, edema or  perhaps atelectasis.  2. Appearance of the left lung is stable.  This report was finalized on 01/22/2019 07:44 by Dr Handy Avery, .          I have reviewed the patient's current medications.     Assessment/Plan     Active Hospital Problems    Diagnosis   • PNA (pneumonia)   • Acute respiratory failure with hypercapnia (CMS/HCC)   • Controlled type 2 diabetes mellitus without complication, with  long-term current use of insulin (CMS/[facility])     1) Acute hypoxic and acute on chronic hypercapnia respiratory failure due to left-sided pleural effusion (suspected empyema) and right lower lobe community-acquired pneumonia   2) Septic shock due to community-acquired pneumonia and suspected empyema  3) Acute kidney injury, resolved  4) Morbid obesity   5) Normocytic anemia  6) Suppressed TSH, normal Free T4  7) Type 2 DM complicated by hyperglycemia  8) Hyperkalemia, improved  9) Constipation, resolved   10) Hypernatremia    Plan:  1.  CT Chest reviewed; also reviewed with family this AM at bedside  2.  Was on Levaquin and Cefepime (received 4 days of tx); now on IV Zosyn - day 4  3.  Appreciate CT Surg input regarding repeat CT Chest findings  4.  Appreciate Pulmonary input  5.  Follow-up cultures; currently negative  6.  Lovenox/Pepcid PPx  7.  Nebs scheduled  8.  Last received Lasix on 1/19  9.  TFs currently at 40ml/hr; minimal residuals per nursing  10.  Insulin gtt per protocol for tight control of BSs  11.  ICU care  12.  Updated daughter at bedside this AM      Tim Grey MD   01/22/19   8:02 AM

## 2019-01-22 NOTE — PLAN OF CARE
Problem: Patient Care Overview  Goal: Plan of Care Review   01/21/19 9797   OTHER   Outcome Summary pt remains on vent with light sedation. moderate amount of clear, thick, secretions from ETT and oral suctioning. patient will wake and follow commands, and attempt to mouth words to staff. insulin gtt continues at 2u/hr and BG remains stable. UOP adequate. chest tube drainage slowing down. no BM. pt remains afebrile. spouse at bedside most of day and interacting with pt.

## 2019-01-22 NOTE — PLAN OF CARE
Problem: Patient Care Overview  Goal: Plan of Care Review  Outcome: Ongoing (interventions implemented as appropriate)   01/22/19 1012   OTHER   Outcome Summary Pt. tolerates and will continue to benefit from PROM.   Coping/Psychosocial   Plan of Care Reviewed With patient

## 2019-01-23 NOTE — PROGRESS NOTES
"Pharmacy Dosing Service  Pharmacokinetics  Vancomycin Initial Evaluation    Assessment/Action/Plan:  Initiated Vancomycin 1250 mg IVPB every 12 hours. Vancomycin trough ordered prior to 4th dose on 01/24/19 before 0800 dose. Pharmacy will monitor renal function and adjust dose accordingly.     Subjective:  Alan Carlin is a 58 y.o. male with a Vancomycin \"Pharmacy to Dose\" consult for the treatment of pneumonia .    Objective:  Ht: 175.3 cm (69\"); Wt: (!) 156 kg (344 lb 5.7 oz)  Estimated Creatinine Clearance: 157.5 mL/min (by C-G formula based on SCr of 0.75 mg/dL).   Lab Results   Component Value Date    CREATININE 0.75 01/22/2019    CREATININE 0.80 01/21/2019    CREATININE 1.16 01/20/2019      Lab Results   Component Value Date    WBC 16.47 (H) 01/22/2019    WBC 14.59 (H) 01/21/2019    WBC 16.02 (H) 01/20/2019      Baseline culture results:  Microbiology Results (last 10 days)       Procedure Component Value - Date/Time    MRSA Screen Culture - Swab, Nares [[id number]]  (Normal) Collected:  01/16/19 1727    Lab Status:  Final result Specimen:  Swab from Nares Updated:  01/18/19 0718     MRSA SCREEN CX No Methicillin Resistant Staphylococcus aureus isolated    Body Fluid Culture - Body Fluid, Pleural Cavity [[id number]] Collected:  01/16/19 0937    Lab Status:  Final result Specimen:  Body Fluid from Pleural Cavity Updated:  01/21/19 0759     BF Culture No growth at 5 days     Gram Stain Many (4+) WBCs seen      No organisms seen    Fungus Culture - Body Fluid, Pleural Cavity [[id number]] Collected:  01/16/19 0937    Lab Status:  Preliminary result Specimen:  Body Fluid from Pleural Cavity Updated:  01/21/19 1000     Fungus Culture No fungus isolated at less than 1 week    Anaerobic Culture - Pleural Fluid, Pleural Cavity [[id number]] Collected:  01/16/19 0929    Lab Status:  Final result Specimen:  Pleural Fluid from Pleural Cavity Updated:  01/21/19 1116     Culture No anaerobes isolated    Legionella Antigen, " Urine - Urine, Urine, Catheter [[id number]]  (Normal) Collected:  01/16/19 0046    Lab Status:  Final result Specimen:  Urine, Catheter Updated:  01/16/19 0209     LEGIONELLA ANTIGEN, URINE Negative    S. Pneumo Ag Urine or CSF - Urine, Urine, Catheter [[id number]]  (Normal) Collected:  01/16/19 0046    Lab Status:  Final result Specimen:  Urine, Catheter Updated:  01/16/19 0209     Strep Pneumo Ag Negative    Blood Culture - Blood, Hand, Right [[id number]] Collected:  01/16/19 0036    Lab Status:  Final result Specimen:  Blood from Hand, Right Updated:  01/21/19 0045     Blood Culture No growth at 5 days    Blood Culture - Blood, Arm, Left [[id number]] Collected:  01/16/19 0036    Lab Status:  Final result Specimen:  Blood from Arm, Left Updated:  01/21/19 0045     Blood Culture No growth at 5 days    Respiratory Culture - Sputum, ET Suction [[id number]]  (Abnormal) Collected:  01/15/19 2009    Lab Status:  Final result Specimen:  Sputum from ET Suction Updated:  01/17/19 0754     Respiratory Culture Rare Normal Respiratory Nellie      Rare Radha albicans     Gram Stain Greater than 25 WBCs per low power field      Rare (1+) Epithelial cells per low power field      Rare (1+) Mixed gram positive nellie            Domi Lagunas, [facility]  01/22/19 6:57 PM

## 2019-01-23 NOTE — PLAN OF CARE
Problem: Patient Care Overview  Goal: Plan of Care Review  Outcome: Ongoing (interventions implemented as appropriate)   01/23/19 8793   OTHER   Outcome Summary Pt. tolerates and will continue to benefit from PROM.   Coping/Psychosocial   Plan of Care Reviewed With patient

## 2019-01-23 NOTE — PROGRESS NOTES
[facility]  INPATIENT PROGRESS NOTE    Patient Name: Alan Carlin  Date of Admission: 1/15/2019  Today's Date: 01/23/19  Length of Stay: 8  Primary Care Physician: Francy Dumont MD    Subjective   Chief Complaint: follow-up respiratory failure  HPI   Patient had a significant amount of respiratory secretions from the ventilator last night; this seemed to coincide with the administration of guaifenesin per nursing report.  That medication has subsequently been put on hold.  Patient did require that his FiO2 increased to 100%; PEEP still at 12.  No significant residuals from tube feeds.  Nursing reports the patient wakes up and follows commands when sedation is held.  Family at bedside.    Review of Systems     All pertinent negatives and positives are as above. All other systems have been reviewed and are negative unless otherwise stated.     Objective    Temp:  [99.2 °F (37.3 °C)-100.4 °F (38 °C)] 100.3 °F (37.9 °C)  Heart Rate:  [] 108  Resp:  [13-33] 24  BP: ()/(57-97) 106/60  FiO2 (%):  [80 %-100 %] 100 %  Physical Exam   Constitutional: No distress.   Sedated on propofol; on mechanical ventilation   HENT:   Head: Normocephalic.   Mouth/Throat: No oropharyngeal exudate (ET tube in place).   Eyes: Pupils are equal, round, and reactive to light. No scleral icterus.   Neck: No tracheal deviation present.   Cardiovascular: Normal rate and regular rhythm.   Pulmonary/Chest: Effort normal. He has rales.   On 100% Fi02 and 12 PEEP; BSs more diminished on left as compared to right; chest tube in place on the left   Abdominal: Soft. He exhibits no distension. There is no tenderness. There is no rebound.   Genitourinary:   Genitourinary Comments: Hammonds in place   Musculoskeletal: He exhibits edema.   LUE PICC; edema noted to RUE   Neurological: He is alert.   On propofol for sedation   Skin: Skin is warm and dry. He is not diaphoretic.   Vitals  reviewed.      Results Review:  I have reviewed the labs, radiology results, and diagnostic studies.    Laboratory Data:   Results from last 7 days   Lab Units 01/23/19  0308 01/22/19  0257 01/21/19  0256   WBC 10*3/mm3 17.30* 16.47* 14.59*   HEMOGLOBIN g/dL 11.9* 11.6* 11.5*   HEMATOCRIT % 38.8* 38.0* 36.5*   PLATELETS 10*3/mm3 334 312 294        Results from last 7 days   Lab Units 01/23/19  0308 01/22/19  0257 01/21/19  0256  01/19/19  0230 01/18/19  0418   SODIUM mmol/L 145 146* 145   < > 144 140   POTASSIUM mmol/L 4.4 4.7 4.8   < > 4.5 5.4*   CHLORIDE mmol/L 105 105 107   < > 104 102   CO2 mmol/L 33.0* 32.0* 33.0*   < > 30.0 31.0   BUN mg/dL 31* 41* 66*   < > 76* 72*   CREATININE mg/dL 0.73 0.75 0.80   < > 1.07 0.96   CALCIUM mg/dL 8.9 8.9 8.9   < > 8.8 8.6   BILIRUBIN mg/dL 0.4  --   --   --  0.3 0.4   ALK PHOS U/L 76  --   --   --  74 77   ALT (SGPT) U/L 32  --   --   --  30 34   AST (SGOT) U/L 26  --   --   --  42 42   GLUCOSE mg/dL 99 132* 139*   < > 160* 348*    < > = values in this interval not displayed.       Culture Data:   Blood Culture   Date Value Ref Range Status   01/16/2019 No growth at 5 days  Final   01/16/2019 No growth at 5 days  Final     MRSA SCREEN CX   Date Value Ref Range Status   01/16/2019   Final    No Methicillin Resistant Staphylococcus aureus isolated     Respiratory Culture   Date Value Ref Range Status   01/15/2019 Rare Normal Respiratory Ana Lilia  Final   01/15/2019 Rare Candida albicans (A)  Final       Radiology Data:   Imaging Results (last 24 hours)     Procedure Component Value Units Date/Time    XR Chest 1 View [[id number]] Collected:  01/23/19 0734     Updated:  01/23/19 0738    Narrative:       Frontal supine radiograph of the chest 1/23/2019 4:32 AM CST     History: intubated; A41.9-Sepsis, unspecified organism; R65.21-Severe  sepsis with septic shock; Z74.09-Other reduced mobility     Comparison: Chest x-ray dated 1/22/2019      Findings:   Lines and tubes are stable in  position. Bilateral parenchymal opacities  have increased.. The cardiomediastinal silhouette and pulmonary  vascularity are unchanged.       No acute osseous or soft tissue abnormality is noted.        Impression:       Impression:   1.   Increasing bilateral parenchymal opacities, concerning for  worsening findings of infection..        This report was finalized on 01/23/2019 07:35 by Dr. Shanice Tabor MD.    CT Chest Without Contrast [[id number]] Collected:  01/22/19 0745     Updated:  01/22/19 0756    Narrative:       CT CHEST WO CONTRAST- 1/22/2019 6:02 AM CST     HISTORY: Pleural effusion; A41.9-Sepsis, unspecified organism;  R65.21-Severe sepsis with septic shock; Z74.09-Other reduced mobility     COMPARISON: Multiple prior chest x-rays, the most recent which is dated  1/22/2019     DOSE LENGTH PRODUCT: 538 mGy cm. Automated exposure control was also  utilized to decrease patient radiation dose.     TECHNIQUE: Serial helical tomographic images of the chest were acquired.  Multiplanar reformatted images were provided for review.     FINDINGS:  The imaged portion of the neck and thyroid gland is unremarkable.      A left-sided pleural drain is identified in the left lung base. There is  essentially complete collapse of the left lower lobe with partial left  upper lobe collapse. There is a multiloculated effusion in the left  pleural space, the largest component of which is seen along the  posterior costal pleura in the left apex. Dependent peripheral  consolidations are seen in the right lower lobe and right upper lobe as  well and there is a small inflammatory nodularity throughout the right  upper, middle and lower lobes. No right-sided pleural effusion. There is  an endotracheal tube which appears in good position. Airways are  otherwise clear.     The heart is normal in size. Thoracic aorta is normal in caliber.  Central pulmonary arteries are mildly dilated, with the main pulmonary  artery measuring up to  34 mm in greatest axial diameter. No pericardial  effusion. There is moderate coronary artery atheromatous calcification.  No enlarged axillary or mediastinal lymph nodes are present.      No acute findings are seen in the bones or surrounding soft tissues.     No acute findings are seen in the visualized portion of the upper  abdomen. Cholelithiasis. Enteric tube is in good position.       Impression:       1. Complete left lower lobe collapse with partial left upper lobe  collapse, in part due to the adjacent multiloculated left pleural  effusion/empyema. A left chest tube is seen in the left lung base, with  the largest remaining component of the effusion/empyema now seen at the  left apex.  2. Dense consolidation in the dependent portions of the right upper and  right lower lobes with additional inflammatory nodularity throughout all  3 lobes on the right. The inflammatory nodularity in particular raises  suspicion for a developing infectious process in right lung.  3. Endotracheal tube and enteric tube appear in good position.  This report was finalized on 01/22/2019 07:53 by Dr Handy Avery, .    XR Chest 1 View [[id number]] Collected:  01/22/19 0740     Updated:  01/22/19 0753    Narrative:       XR CHEST 1 VW- 1/22/2019 3:25 AM CST     HISTORY: chest tube, vent; A41.9-Sepsis, unspecified organism;  R65.21-Severe sepsis with septic shock; Z74.09-Other reduced mobility       COMPARISON: 1/21/2019.     FINDINGS:      Endotracheal tube is in good position. There is a left chest tube in the  left lung base, paralleling the left hemidiaphragm. There continues to  be opacification of the left lung base with obscuration of the left  hemidiaphragm and a fluid meniscus along the left chest wall. There is  dense opacification also of the left apex though it appears to be a  loculated effusion at the apex. There is increasing consolidation  throughout the right lung is well, affecting both the upper and lower  lung  fields. There may be a trace effusion in the pleural space at the  right apex. An enteric tube is identified, the tip being difficult to  see on this exam.     The osseous structures and surrounding soft tissues demonstrate no acute  abnormality.       Impression:       1. Diffuse increased consolidation throughout the right lung from the  prior exams, suspicious for developing infectious infiltrate, edema or  perhaps atelectasis.  2. Appearance of the left lung is stable.  This report was finalized on 01/22/2019 07:44 by Dr Handy Avery, .          I have reviewed the patient's current medications.     Assessment/Plan     Active Hospital Problems    Diagnosis   • PNA (pneumonia)   • Acute respiratory failure with hypercapnia (CMS/HCC)   • Controlled type 2 diabetes mellitus without complication, with long-term current use of insulin (CMS/HCC)     1) Acute hypoxic and acute on chronic hypercapnia respiratory failure due to left-sided pleural effusion and suspected empyema and right lower lobe community-acquired pneumonia   2) Septic shock due to community-acquired pneumonia and empyema  3) Acute kidney injury, resolved  4) Morbid obesity   5) Normocytic anemia  6) Suppressed TSH, normal Free T4  7) Type 2 DM complicated by hyperglycemia  8) Hyperkalemia, improved  9) Constipation, resolved   10) Hypernatremia    Plan:  1.  Added Vancomycin back last PM  2.  Was on Levaquin (received 3 days of tx) and Cefepime (received 4 days of tx); has been on IV Zosyn for approx 5 days; I am going to transition to IV Merrem and add back Levaquin as well  3.  Appreciate CT Surg and Pulmonary input  4.  Certainly a difficult decision and very, very high risk regarding proceeding to surgery (VATS) in the setting of Fi02 at 100% and PEEP 12.  Other consideration would be placing additional chest tube in different location with hopes of finding a pocket that would drain more adequately.  Pulmozyme and alteplase restarted.  5.   Follow-up cultures; currently negative  6.  Lovenox/Pepcid PPx  7.  Nebs scheduled  8.  Last received Lasix on 1/19; repeat dose of IV Lasix today  9.  TFs currently at 40ml/hr; minimal residuals per nursing  10.  Insulin gtt per protocol for tight control of BSs  11.  ICU care  12.  Patient is critically ill  13.  Updated spouse at bedside this AM      Tim Grey MD   01/23/19   7:49 AM

## 2019-01-23 NOTE — PROGRESS NOTES
PULMONARY AND CRITICAL CARE PROGRESS NOTE - [facility]    Patient: Alan Carlin    1960    MR# [id number]    [facility]# [id number]  01/23/19   10:22 AM  Referring Provider: Tim Grey MD    Chief Complaint: Mechanically ventilated    Interval history: He remains intubated and sedated.  FiO2 has been increased to 100% and he remains on 12.5 of PEEP.  Chest x-ray shows worsening bilateral infiltrates.  His wife is at bedside this morning and has been updated.   Nutrition is being addressed.    Meds:    enoxaparin 40 mg Subcutaneous Q24H   famotidine 20 mg Intravenous BID   guaiFENesin 400 mg Oral Q8H   ipratropium-albuterol 3 mL Nebulization Q4H - RT   levoFLOXacin 750 mg Intravenous Q24H   meropenem 2 g Intravenous Q8H   polyethylene glycol 17 g Oral Once   sodium chloride 3 mL Intravenous Q12H   sodium polystyrene 15 g Oral Once   vancomycin 1,250 mg Intravenous Q12H       insulin regular infusion 1 unit/mL 1-20 Units/hr Last Rate: 3 Units/hr (01/23/19 0813)   norepinephrine 0.02-0.3 mcg/kg/min Last Rate: Stopped (01/17/19 1730)   propofol 5-50 mcg/kg/min Last Rate: 40 mcg/kg/min (01/23/19 0904)     Review of Systems:   Cannot obtain due to mechanical ventilation.  The patient notably is critically ill and connected to a ventilator.  As such patient cannot communicate and provide any history whatsoever, including any history of present illness or interval history since arrival or review of systems. The interested reviewer may note this fact, as an attempt has been made at collecting and documenting these portions of the patient history, but this information is unobtainable despite attempted review and therefore cannot be documented at this time.     Ventilator Settings:     Vt (Set, L): 0.6 L  Resp Rate (Set): 18  Pressure Support (cm H2O): 0 cm H20  FiO2 (%): 100 %  PEEP/CPAP (cm H2O): 12.5 cm H20  Minute Ventilation (L/min) (Obs): 13.5 L/min  Resp Rate (Observed) Vent: 22  I:E Ratio  (Set): 1:1.40  I:E Ratio (Obs): 1:1.4  PIP Observed (cm H2O): 31 cm H2O     Physical Exam:  Temp:  [99.2 °F (37.3 °C)-100.4 °F (38 °C)] 99.4 °F (37.4 °C)  Heart Rate:  [] 103  Resp:  [13-33] 25  BP: ()/(60-97) 108/67  FiO2 (%):  [80 %-100 %] 100 %    Intake/Output Summary (Last 24 hours) at 1/23/2019 1022  Last data filed at 1/23/2019 1005  Gross per 24 hour   Intake 4032.99 ml   Output 1445 ml   Net 2587.99 ml     SpO2 Percentage    01/23/19 0732 01/23/19 0748 01/23/19 0847   SpO2: 91% 92% 92%      Physical Exam:    Constitutional: He appears well-developed and well-nourished. No distress. Sedated and intubated.  Obese.  HENT: ET tube in place, OG with nutrition infusing.  Head: Normocephalic and atraumatic.   Eyes: Pupils are equal, round, and reactive to light. Right eye exhibits no discharge. Left eye exhibits no discharge.   Neck: Normal range of motion. Neck supple.   thick   Cardiovascular:  Tachycardia.   No murmur heard.  Pulmonary/Chest: He has decreased breath sounds in the left upper field and the left lower field.  bilateral rales.     left-sided chest tube in place with minimal drainage  Abdominal: Soft. Bowel sounds are normal.  Abdomen is obese.    Musculoskeletal: He exhibits edema. He exhibits no deformity.   Neurological:   Intubated and sedated    Skin: Skin is warm and dry. No rash noted. He is not diaphoretic. No erythema.   Psychiatric: He is sedated.  Nursing note and vitals reviewed.        Results from last 7 days   Lab Units 01/23/19  0308 01/22/19  0257 01/21/19  0256   WBC 10*3/mm3 17.30* 16.47* 14.59*   HEMOGLOBIN g/dL 11.9* 11.6* 11.5*   PLATELETS 10*3/mm3 334 312 294     Results from last 7 days   Lab Units 01/23/19  0308 01/22/19  0257 01/21/19  0256   SODIUM mmol/L 145 146* 145   POTASSIUM mmol/L 4.4 4.7 4.8   BUN mg/dL 31* 41* 66*   CREATININE mg/dL 0.73 0.75 0.80     Results from last 7 days   Lab Units 01/23/19  0440 01/22/19 0225 01/21/19 2109   PH, ARTERIAL pH  units 7.432 7.466* 7.446   PCO2, ARTERIAL mm Hg 50.2* 47.0* 49.2*   PO2 ART mm Hg 75.7* 66.8* 85.8   FIO2 % 100 80 80     Blood Culture   Date Value Ref Range Status   01/16/2019 No growth at 24 hours  Preliminary   01/16/2019 No growth at 24 hours  Preliminary     Respiratory Culture   Date Value Ref Range Status   01/15/2019 Rare Normal Respiratory Ana Lilia  Final   01/15/2019 Rare Candida albicans (A)  Final     Recent films:  Ct Chest Without Contrast    Result Date: 1/22/2019  1. Complete left lower lobe collapse with partial left upper lobe collapse, in part due to the adjacent multiloculated left pleural effusion/empyema. A left chest tube is seen in the left lung base, with the largest remaining component of the effusion/empyema now seen at the left apex. 2. Dense consolidation in the dependent portions of the right upper and right lower lobes with additional inflammatory nodularity throughout all 3 lobes on the right. The inflammatory nodularity in particular raises suspicion for a developing infectious process in right lung. 3. Endotracheal tube and enteric tube appear in good position. This report was finalized on 01/22/2019 07:53 by Dr Handy Avery, .    Xr Chest 1 View    Result Date: 1/23/2019  Impression: 1.   Increasing bilateral parenchymal opacities, concerning for worsening findings of infection..   This report was finalized on 01/23/2019 07:35 by Dr. Shanice Tabor MD.    Xr Chest 1 View    Result Date: 1/22/2019  1. Diffuse increased consolidation throughout the right lung from the prior exams, suspicious for developing infectious infiltrate, edema or perhaps atelectasis. 2. Appearance of the left lung is stable. This report was finalized on 01/22/2019 07:44 by Dr Handy Avery, .    Films reviewed personally by me.  My interpretation: Endotracheal tube in position, increased bilateral infiltrates    Pulmonary Assessment:    1. Acute hypoxemic respiratory fair requiring mechanical  ventilation  2. Empyema, left  3. Type II diabetes  4. Thyroid disease  5. Acute kidney injury  6. Hypotension requiring pressors  7. Sepsis  8. Leukocytosis    Recommend:     · Changed from AC/PC to AC/VC, increased peep to 14.  · F/u ABG's around 1230. Call with results.   · Added mucomyst BID for thick lung secretions.   · Chest tube management per Dr. Oglesby  · Continue nebs scheduled  · Chest x-ray and ABG daily while on the ventilator  · Stress ulcer and DVT prophylaxis  · Antibiotics changed to Merrem and Levaquin  · Altepase and Pulmozyme per CT surgery   · His wife was updated at bedside this morning.  She reports that she and her daughters have talked extensively and hope that the patient will be able to proceed with some type of surgical intervention however, she states understanding that he is critically ill and may not be a candidate for surgery at this time.  She has discussed her wishes with the attending this morning as well and hopes to have some type of conference with Dr. Oglesby.    Electronically signed by LUIS E Huerta on 1/23/2019 at 10:22 AM    Pt was visited twice by me.  In between visits he desaturated and peep was increased to 14 with improvement in oxygenation.  Chest has diminished breath sounds bilat.  Discussed with his family.  Prognosis seems poor.  No room for any additional decompensation.  I have seen and examined patient personally, performing a face-to-face diagnostic evaluation with plan of care reviewed and developed with APRN and nursing staff. I have addended and/or modified the above history of present illness, physical examination, and assessment and plan to reflect my findings and impressions. Essential elements of the care plan were discussed with APRN above.  Agree with findings and assessment/plan as documented above.    Electronically signed by Damion Wright MD, on 1/23/2019, 11:15 PM    EMR Dragon/Transcription disclaimer: Much of this  encounter note is an electronic transcription/translation of spoken language to printed text. The electronic translation of spoken language may permit erroneous, or at times, nonsensical words or phrases to be inadvertently transcribed; although I have reviewed the note for such errors, some may still exist.

## 2019-01-23 NOTE — PLAN OF CARE
Problem: Skin Injury Risk (Adult)  Goal: Identify Related Risk Factors and Signs and Symptoms  Outcome: Ongoing (interventions implemented as appropriate)    Goal: Skin Health and Integrity  Outcome: Ongoing (interventions implemented as appropriate)      Problem: Fall Risk (Adult)  Goal: Identify Related Risk Factors and Signs and Symptoms  Outcome: Ongoing (interventions implemented as appropriate)    Goal: Absence of Fall  Outcome: Ongoing (interventions implemented as appropriate)      Problem: Patient Care Overview  Goal: Plan of Care Review   01/23/19 6099   OTHER   Outcome Summary pt remains sedated on vent. Desat down to 89% this hs with lavage and suction per RT, later required 02 to be increased to 100%. Pt moves all extremities, nods appropriately and follows commands. Salmeron catheter clotted off with sediment this shift and was unable to irrigate. Replaced salmeron, tolerated well. Family has remained at bedside. Updated by Dr. Oglesby this am.     Goal: Individualization and Mutuality  Outcome: Ongoing (interventions implemented as appropriate)    Goal: Discharge Needs Assessment  Outcome: Ongoing (interventions implemented as appropriate)    Goal: Interprofessional Rounds/Family Conf  Outcome: Ongoing (interventions implemented as appropriate)      Problem: Nutrition, Enteral (Adult)  Goal: Signs and Symptoms of Listed Potential Problems Will be Absent, Minimized or Managed (Nutrition, Enteral)  Outcome: Ongoing (interventions implemented as appropriate)      Problem: Sepsis/Septic Shock (Adult)  Goal: Signs and Symptoms of Listed Potential Problems Will be Absent, Minimized or Managed (Sepsis/Septic Shock)  Outcome: Ongoing (interventions implemented as appropriate)      Problem: Pneumonia (Adult)  Goal: Signs and Symptoms of Listed Potential Problems Will be Absent, Minimized or Managed (Pneumonia)  Outcome: Ongoing (interventions implemented as appropriate)

## 2019-01-23 NOTE — PLAN OF CARE
Problem: Skin Injury Risk (Adult)  Goal: Identify Related Risk Factors and Signs and Symptoms  Outcome: Ongoing (interventions implemented as appropriate)    Goal: Skin Health and Integrity  Outcome: Ongoing (interventions implemented as appropriate)      Problem: Fall Risk (Adult)  Goal: Identify Related Risk Factors and Signs and Symptoms  Outcome: Ongoing (interventions implemented as appropriate)    Goal: Absence of Fall  Outcome: Ongoing (interventions implemented as appropriate)      Problem: Patient Care Overview  Goal: Individualization and Mutuality  Outcome: Ongoing (interventions implemented as appropriate)    Goal: Discharge Needs Assessment  Outcome: Ongoing (interventions implemented as appropriate)    Goal: Interprofessional Rounds/Family Conf  Outcome: Ongoing (interventions implemented as appropriate)      Problem: Nutrition, Enteral (Adult)  Goal: Signs and Symptoms of Listed Potential Problems Will be Absent, Minimized or Managed (Nutrition, Enteral)  Outcome: Ongoing (interventions implemented as appropriate)      Problem: Sepsis/Septic Shock (Adult)  Goal: Signs and Symptoms of Listed Potential Problems Will be Absent, Minimized or Managed (Sepsis/Septic Shock)  Outcome: Ongoing (interventions implemented as appropriate)      Problem: Pneumonia (Adult)  Goal: Signs and Symptoms of Listed Potential Problems Will be Absent, Minimized or Managed (Pneumonia)  Outcome: Ongoing (interventions implemented as appropriate)

## 2019-01-23 NOTE — PROGRESS NOTES
"Patient: Alan Carlin  : 1960     Procedure:     Procedure Date:     POD: * No surgery found *      Subjective   Still sedated.  When given medication profile he acts appropriately.  Had to go up 200% O2 sat.     Objective   Visit Vitals  /77   Pulse 114   Temp 100.3 °F (37.9 °C) (Axillary)   Resp 28   Ht 175.3 cm (69\")   Wt (!) 156 kg (343 lb 0.6 oz)   SpO2 94%   BMI 50.66 kg/m²       Intake/Output Summary (Last 24 hours) at 2019 0529  Last data filed at 2019 0400  Gross per 24 hour   Intake 2857.51 ml   Output 1650 ml   Net 1207.51 ml                                        Lab Results:    CBC:   Results from last 7 days   Lab Units 19  0308 19  0257 19  025   WBC 10*3/mm3 17.30* 16.47* 14.59*   HEMATOCRIT % 38.8* 38.0* 36.5*   PLATELETS 10*3/mm3 334 312 294     BMP:   Results from last 7 days   Lab Units 19  0308 19  0257 19  0256   SODIUM mmol/L 145 146* 145   POTASSIUM mmol/L 4.4 4.7 4.8   CHLORIDE mmol/L 105 105 107   CO2 mmol/L 33.0* 32.0* 33.0*   GLUCOSE mg/dL 99 132* 139*   BUN mg/dL 31* 41* 66*   CREATININE mg/dL 0.73 0.75 0.80     Coag:   Results from last 7 days   Lab Units 19  025   INR  1.41*       Images:  Imaging Results (last 24 hours)     Procedure Component Value Units Date/Time    XR Chest 1 View [[id number]] Updated:  19 0432    CT Chest Without Contrast [[id number]] Collected:  19 0745     Updated:  19 0756    Narrative:       CT CHEST WO CONTRAST- 2019 6:02 AM CST     HISTORY: Pleural effusion; A41.9-Sepsis, unspecified organism;  R65.21-Severe sepsis with septic shock; Z74.09-Other reduced mobility     COMPARISON: Multiple prior chest x-rays, the most recent which is dated  2019     DOSE LENGTH PRODUCT: 538 mGy cm. Automated exposure control was also  utilized to decrease patient radiation dose.     TECHNIQUE: Serial helical tomographic images of the chest were acquired.  Multiplanar reformatted " images were provided for review.     FINDINGS:  The imaged portion of the neck and thyroid gland is unremarkable.      A left-sided pleural drain is identified in the left lung base. There is  essentially complete collapse of the left lower lobe with partial left  upper lobe collapse. There is a multiloculated effusion in the left  pleural space, the largest component of which is seen along the  posterior costal pleura in the left apex. Dependent peripheral  consolidations are seen in the right lower lobe and right upper lobe as  well and there is a small inflammatory nodularity throughout the right  upper, middle and lower lobes. No right-sided pleural effusion. There is  an endotracheal tube which appears in good position. Airways are  otherwise clear.     The heart is normal in size. Thoracic aorta is normal in caliber.  Central pulmonary arteries are mildly dilated, with the main pulmonary  artery measuring up to 34 mm in greatest axial diameter. No pericardial  effusion. There is moderate coronary artery atheromatous calcification.  No enlarged axillary or mediastinal lymph nodes are present.      No acute findings are seen in the bones or surrounding soft tissues.     No acute findings are seen in the visualized portion of the upper  abdomen. Cholelithiasis. Enteric tube is in good position.       Impression:       1. Complete left lower lobe collapse with partial left upper lobe  collapse, in part due to the adjacent multiloculated left pleural  effusion/empyema. A left chest tube is seen in the left lung base, with  the largest remaining component of the effusion/empyema now seen at the  left apex.  2. Dense consolidation in the dependent portions of the right upper and  right lower lobes with additional inflammatory nodularity throughout all  3 lobes on the right. The inflammatory nodularity in particular raises  suspicion for a developing infectious process in right lung.  3. Endotracheal tube and enteric  tube appear in good position.  This report was finalized on 01/22/2019 07:53 by Dr Handy Avery, .    XR Chest 1 View [[id number]] Collected:  01/22/19 0740     Updated:  01/22/19 0753    Narrative:       XR CHEST 1 VW- 1/22/2019 3:25 AM CST     HISTORY: chest tube, vent; A41.9-Sepsis, unspecified organism;  R65.21-Severe sepsis with septic shock; Z74.09-Other reduced mobility       COMPARISON: 1/21/2019.     FINDINGS:      Endotracheal tube is in good position. There is a left chest tube in the  left lung base, paralleling the left hemidiaphragm. There continues to  be opacification of the left lung base with obscuration of the left  hemidiaphragm and a fluid meniscus along the left chest wall. There is  dense opacification also of the left apex though it appears to be a  loculated effusion at the apex. There is increasing consolidation  throughout the right lung is well, affecting both the upper and lower  lung fields. There may be a trace effusion in the pleural space at the  right apex. An enteric tube is identified, the tip being difficult to  see on this exam.     The osseous structures and surrounding soft tissues demonstrate no acute  abnormality.       Impression:       1. Diffuse increased consolidation throughout the right lung from the  prior exams, suspicious for developing infectious infiltrate, edema or  perhaps atelectasis.  2. Appearance of the left lung is stable.  This report was finalized on 01/22/2019 07:44 by Dr Handy Avery, .        Images Today: Chest x-ray about the same.  CT scan shows multiple loculations still.  Chest tube in the basilar area in good position but loculated fluid throughout especially at the apex.  Right lower lobe has dense consolidation.  No real improvement from previous CT scan     Physical Exam:   Physical Exam:  General: Sedated on ventilator.   Chest: Some rales and wheezes throughout.  Markedly decreased breath sounds on the left and at the right base.  Heart:  Regular rate and rhythm without murmur gallop or rub.  (Normal sinus rhythm on the monitor.)      Impression: CT scan yesterday showed little improvement.  He has no fluid on the right but dense consolidation right lower lobe.  Left side has loculated fluid is mentioned above.  Thoracostomy tube in place but the fluid is loculated and thick and  Is not draining much.    Plan: Re-institute alteplase/Pulmozyme.  Not making much progress presently.  I will talk with Dr. Grey from hospitalist service later today.    Berlin Oglesby MD  01/23/19  5:29 AM

## 2019-01-23 NOTE — PLAN OF CARE
Problem: Patient Care Overview  Goal: Plan of Care Review  Outcome: Ongoing (interventions implemented as appropriate)   01/22/19 2013   OTHER   Outcome Summary pt remains vented with chest tube iv propofol gtt continues low grade temp insulin gtt continues bs in 200's restarted vancomycin pt with copious amount of secretinos today oral and via ettube salmeron cath with good urine output tolerated tube feeds babatunde's    Coping/Psychosocial   Plan of Care Reviewed With spouse;daughter   Plan of Care Review   Progress no change

## 2019-01-24 NOTE — PLAN OF CARE
Problem: Patient Care Overview  Goal: Plan of Care Review  Outcome: Ongoing (interventions implemented as appropriate)    Goal: Discharge Needs Assessment  Outcome: Ongoing (interventions implemented as appropriate)    Goal: Interprofessional Rounds/Family Conf  Outcome: Ongoing (interventions implemented as appropriate)      Problem: Nutrition, Enteral (Adult)  Goal: Signs and Symptoms of Listed Potential Problems Will be Absent, Minimized or Managed (Nutrition, Enteral)  Outcome: Ongoing (interventions implemented as appropriate)      Problem: Ventilation, Mechanical Invasive (Adult)  Goal: Signs and Symptoms of Listed Potential Problems Will be Absent, Minimized or Managed (Ventilation, Mechanical Invasive)  Outcome: Ongoing (interventions implemented as appropriate)      Problem: Sepsis/Septic Shock (Adult)  Goal: Signs and Symptoms of Listed Potential Problems Will be Absent, Minimized or Managed (Sepsis/Septic Shock)  Outcome: Ongoing (interventions implemented as appropriate)      Problem: Pneumonia (Adult)  Goal: Signs and Symptoms of Listed Potential Problems Will be Absent, Minimized or Managed (Pneumonia)  Outcome: Ongoing (interventions implemented as appropriate)

## 2019-01-24 NOTE — PROGRESS NOTES
Continued Stay Note   [place]     Patient Name: Alan Carlin  MRN: [id number]  Today's Date: 1/24/2019    Admit Date: 1/15/2019    Discharge Plan     Row Name 01/24/19 1422       Plan    Plan  Transfer to     Patient/Family in Agreement with Plan  yes    Plan Comments  Patient has transfer pending at .   ( transfer center: [phone]).  Patient will transport via fixed wing through [facility].  To dispatch once bed is available contact [phone].  [facility] requires hospital to send drips patient is currently receiving and to keep chest tube.  Please note several flight transport companies were attempted and declined due to patient's size.  [facility], [facility] and [facility] were previously contacted and all declined.          Discharge Codes    No documentation.             DARLYN Ashraf

## 2019-01-24 NOTE — PROGRESS NOTES
"Pharmacy Dosing Service  Pharmacokinetics  Vancomycin Follow-up Evaluation    Assessment/Action/Plan:  1/24 0632 Vancomcyin trough = 14.47 (10 hours post 1250mg) Continue Vancomycin 1250mg iv q12h.  Patient is also receiving Merrem and Levaquin.  Pharmacy will continue to monitor renal function and adjust dose accordingly.     Subjective:  Alan Carlin is a 58 y.o. male currently on Vancomycin 1250 mg IV every 12 hours for the treatment of pneumonia, day 3 of therapy.    Objective:  Ht: 175.3 cm (69\"); Wt: (!) 156 kg (343 lb 0.6 oz)  Estimated Creatinine Clearance: 107.4 mL/min (by C-G formula based on SCr of 1.1 mg/dL).   Lab Results   Component Value Date    CREATININE 1.10 01/24/2019    CREATININE 0.73 01/23/2019    CREATININE 0.75 01/22/2019      Lab Results   Component Value Date    WBC 24.26 (H) 01/24/2019    WBC 17.30 (H) 01/23/2019    WBC 16.47 (H) 01/22/2019         Lab Results   Component Value Date    VANCOTROUGH 14.47 01/24/2019       Culture Results:  Microbiology Results (last 10 days)       Procedure Component Value - Date/Time    MRSA Screen Culture - Swab, Nares [[id number]]  (Normal) Collected:  01/16/19 1727    Lab Status:  Final result Specimen:  Swab from Nares Updated:  01/18/19 0718     MRSA SCREEN CX No Methicillin Resistant Staphylococcus aureus isolated    Body Fluid Culture - Body Fluid, Pleural Cavity [[id number]] Collected:  01/16/19 0937    Lab Status:  Final result Specimen:  Body Fluid from Pleural Cavity Updated:  01/21/19 0759     BF Culture No growth at 5 days     Gram Stain Many (4+) WBCs seen      No organisms seen    Fungus Culture - Body Fluid, Pleural Cavity [[id number]] Collected:  01/16/19 0937    Lab Status:  Preliminary result Specimen:  Body Fluid from Pleural Cavity Updated:  01/23/19 1001     Fungus Culture No fungus isolated at 1 week    Anaerobic Culture - Pleural Fluid, Pleural Cavity [[id number]] Collected:  01/16/19 0929    Lab Status:  Final result Specimen:  " Pleural Fluid from Pleural Cavity Updated:  01/21/19 1116     Culture No anaerobes isolated    Legionella Antigen, Urine - Urine, Urine, Catheter [[id number]]  (Normal) Collected:  01/16/19 0046    Lab Status:  Final result Specimen:  Urine, Catheter Updated:  01/16/19 0209     LEGIONELLA ANTIGEN, URINE Negative    S. Pneumo Ag Urine or CSF - Urine, Urine, Catheter [[id number]]  (Normal) Collected:  01/16/19 0046    Lab Status:  Final result Specimen:  Urine, Catheter Updated:  01/16/19 0209     Strep Pneumo Ag Negative    Blood Culture - Blood, Hand, Right [[id number]] Collected:  01/16/19 0036    Lab Status:  Final result Specimen:  Blood from Hand, Right Updated:  01/21/19 0045     Blood Culture No growth at 5 days    Blood Culture - Blood, Arm, Left [[id number]] Collected:  01/16/19 0036    Lab Status:  Final result Specimen:  Blood from Arm, Left Updated:  01/21/19 0045     Blood Culture No growth at 5 days    Respiratory Culture - Sputum, ET Suction [[id number]]  (Abnormal) Collected:  01/15/19 2009    Lab Status:  Final result Specimen:  Sputum from ET Suction Updated:  01/17/19 0754     Respiratory Culture Rare Normal Respiratory Nellie      Rare Radha albicans     Gram Stain Greater than 25 WBCs per low power field      Rare (1+) Epithelial cells per low power field      Rare (1+) Mixed gram positive nellie            Dima Lagunas, [facility]   01/24/19 7:46 AM

## 2019-01-24 NOTE — PROGRESS NOTES
"Patient: Alan Carlin  : 1960     Procedure:     Procedure Date:     POD: * No surgery found *      Subjective   No change.  Sedated on ventilator requiring PEEP of 14 and FiO2 100%     Objective   Visit Vitals  BP (!) 150/133   Pulse 105   Temp 97.5 °F (36.4 °C) (Axillary)   Resp 20   Ht 175.3 cm (69\")   Wt (!) 156 kg (343 lb 0.6 oz)   SpO2 91%   BMI 50.66 kg/m²       Intake/Output Summary (Last 24 hours) at 2019 05  Last data filed at 2019 0416  Gross per 24 hour   Intake 4842.35 ml   Output 2310 ml   Net 2532.35 ml                                        Lab Results:    CBC:   Results from last 7 days   Lab Units 19  0308 19  0257 19  025   WBC 10*3/mm3 17.30* 16.47* 14.59*   HEMATOCRIT % 38.8* 38.0* 36.5*   PLATELETS 10*3/mm3 334 312 294     BMP:   Results from last 7 days   Lab Units 19  0308 19  0257 19  0256   SODIUM mmol/L 145 146* 145   POTASSIUM mmol/L 4.4 4.7 4.8   CHLORIDE mmol/L 105 105 107   CO2 mmol/L 33.0* 32.0* 33.0*   GLUCOSE mg/dL 99 132* 139*   BUN mg/dL 31* 41* 66*   CREATININE mg/dL 0.73 0.75 0.80     Coag:   Results from last 7 days   Lab Units 19  025   INR  1.41*       Images:  Imaging Results (last 24 hours)     Procedure Component Value Units Date/Time    XR Chest 1 View [[id number]] Updated:  19 0405    XR Chest 1 View [[id number]] Collected:  19 0734     Updated:  19 0738    Narrative:       Frontal supine radiograph of the chest 2019 4:32 AM CST     History: intubated; A41.9-Sepsis, unspecified organism; R65.21-Severe  sepsis with septic shock; Z74.09-Other reduced mobility     Comparison: Chest x-ray dated 2019      Findings:   Lines and tubes are stable in position. Bilateral parenchymal opacities  have increased.. The cardiomediastinal silhouette and pulmonary  vascularity are unchanged.       No acute osseous or soft tissue abnormality is noted.        Impression:       Impression:   1.   " Increasing bilateral parenchymal opacities, concerning for  worsening findings of infection..        This report was finalized on 01/23/2019 07:35 by Dr. Shanice Tabor MD.        Images Today: Not much change in images today.  It looks somewhat better but may be just due to the technique of the x-ray.  Right lung with significant pneumonia.  Left lung with continued atelectasis/effusion     Physical Exam:   Gen.: Sedated on ventilator.  Chest: Decreased sounds bilaterally with multiple rales and wheezes  Heart: Sinus tachycardia without murmur gallop or rub.    Impression: Unable to make much progress.  He continues to need more PEEP and FiO2 is 100%.  Continues to have a worsening pneumonia on the right plus his significant lung collapse/empyema on the left.  Dr. Grey did broaden the range of antibiotics yesterday.  We have not had any positive cultures to help direct the antibiotic coverage.  Worsening, his right lung is so bad that any operative procedure on the left is occluded at the present time.  I had a long talk with the family yesterday and again this morning.  Talked with Dr. Grey yesterday and he is going to talk with the family about entertaining the possibility of transfer to tertiary phthisis facility if that would be possible.  Prognosis is somewhat poor.    Plan: As above    Berlin Oglesby MD  01/24/19  5:28 AM

## 2019-01-24 NOTE — DISCHARGE PLACEMENT REQUEST
"To: Severiano Flight    From: Marilee Deal [phone]      Alan Wheeler (58 y.o. Male)     Date of Birth Social Security Number Address Home Phone MRN    1960   [facility] [phone] [id number]    Protestant Marital Status          Mormonism        Admission Date Admission Type Admitting Provider Attending Provider Department, Room/Bed    1/15/19 Urgent Tim Grey MD Thompson, Benjamin H, MD [facility] INTENSIVE CARE, I012/1    Discharge Date Discharge Disposition Discharge Destination                       Attending Provider:  iTm Grey MD    Allergies:  No Known Allergies    Isolation:  None   Infection:  None   Code Status:  CPR    Ht:  175.3 cm (69\")   Wt:  156 kg (343 lb 0.6 oz)    Admission Cmt:  None   Principal Problem:  None                Active Insurance as of 1/15/2019     Primary Coverage     Payor Plan Insurance Group Employer/Plan Group    [employer] BLUE CROSS AN[employer]M BLUE CROSS BLUE SHIELD PPO [id number]     Payor Plan Address Payor Plan Phone Number Payor Plan Fax Number Effective Dates    [address] [phone]  6/1/2010 - None Entered    [place] [address]       Subscriber Name Subscriber Birth Date Member ID       FOUZIA WHEELER [birth year]/1961 [id number]                 Emergency Contacts      (Rel.) Home Phone Work Phone Mobile Phone    Fouzia Wheeler (Spouse) [phone] --               History & Physical      Emmanuel Palm MD at 1/15/2019  8:17 PM              [facility] Medicine Services  HISTORY AND PHYSICAL    Date of Admission: 1/15/2019  Primary Care Physician: Francy Dumont MD    Subjective     Chief Complaint: Patient intubated pneumonia sepsis    History of Present Illness  Patient is a 58-year-old white male past medical history of obstructive sleep apnea uses CPAP as well as type II diabetes and hypertension.  He presents " to [facility] with from what his wife says a very long period of illness going on since November.  Apparently he has had upper respiratory type infections that he has not been able to get over.  He has been on antibiotics and steroids off and on for several weeks.  Finally patient states he got to a point where he could not even breathe.  He was sick but actually went to work as a  on Friday.  He subsequently got to the point where he could walk across the room.  He has had a cough chronically and is gotten worse in the last few days.  When he presented to the emergency room there he was found to have a large left lower lobe infiltrate consistent with pneumonia.  He was admitted started on Rocephin and Zithromax.  He subsequently was also bolused with IV fluids per sepsis protocol.  He had a worsening respiratory failure requiring intubation and his chest x-ray showed a large left-sided pneumonia infiltrate and probably effusion taking a probably two thirds of his long.  Repeat chest x-ray here looks even worse with probably 20% of his long visible in the upper lobe.  Please see report below.  He was hypotensive as well recurrent and is on dobutamine.  Patient has very poor IV access with a right antecubital 20-gauge and a 20-gauge in his foot.  He has a 20 year pack history of smoking but has not smoked in 20 years.  He does work as a  so he works obviously had a cold and moist environment all the time.  He also had an elevated PCO2 and is barely satting above 92% on 100% FiO2.  Interesting of note when he lower that of his bed down very much he desats his face turns bright red and he starts bucking and fighting the vent.        Review of Systems   Able to obtain due to patient is intubated on ventilator  Otherwise complete ROS reviewed and negative except as mentioned in the HPI.    Past Medical History:   Past Medical History:   Diagnosis Date   • Dyslipidemia    • Elevated blood  pressure    • Essential hypertension    • Type II diabetes mellitus, uncontrolled (CMS/[facility])      Past Surgical History:No past surgical history on file.  Social History:  reports that he has quit smoking. he has never used smokeless tobacco. He reports that he does not drink alcohol.    Family History: family history includes Diabetes in his other; Thyroid disease in his other.   Patient has diabetes and all of his siblings he also has a twin brother who  in his 40s from a cardiomyopathy    Allergies:  No Known Allergies  Medications:  Prior to Admission medications    Medication Sig Start Date End Date Taking? Authorizing Provider   atenolol (TENORMIN) 50 MG tablet Take 50 mg by mouth Daily.    ProviderWayne MD   Empagliflozin (JARDIANCE) 10 MG tablet Take 10 mg by mouth Daily. 10/29/18   Jamshid Brunson APRN   Insulin Degludec (TRESIBA FLEXTOUCH) 200 UNIT/ML solution pen-injector Inject 80 Units under the skin into the appropriate area as directed Daily. 10/29/18   Jamshid Brunson APRN   Insulin Lispro (HUMALOG KWIKPEN) 100 UNIT/ML solution pen-injector Inject 40 Units under the skin into the appropriate area as directed 3 (Three) Times a Day. 18   Jamshid Brunson APRN   Insulin Pen Needle (B-D ULTRAFINE III SHORT PEN) 31G X 8 MM misc Inject 4 times daily 10/29/18   Jamshid Burnson APRN   lisinopril-hydrochlorothiazide (PRINZIDE,ZESTORETIC) 20-25 MG per tablet Take 1 tablet by mouth Daily.    Provider, MD Wayne   metFORMIN (GLUCOPHAGE) 1000 MG tablet Take 1 tablet by mouth 2 (Two) Times a Day. 10/29/18   Jamshid Brunson APRN   ONE TOUCH ULTRA TEST test strip TEST FOUR TIMES DAILY 18   Jamshid Brunson APRN   rosuvastatin (CRESTOR) 5 MG tablet Take 1 tablet by mouth Every Night. 10/30/18 10/30/19  Jamshid Brunson APRN     Objective     Vital Signs: /77   Pulse 118   Temp 98 °F (36.7 °C) (Axillary)   Wt (!) 154 kg (340 lb  4.8 oz)   SpO2 95%   BMI 48.83 kg/m²    Physical Exam  Gen.  Morbidly obese white male intubated sedated  HEENT: Atraumatic normocephalic pupils equal round reactive to light extraocular movements intact sclerae anicteric TMs negative mucous membranes moist neck is supple without lymphadenopathy ET tube in place as it JVD is noted no carotid bruits are auscultated oropharynx is without erythema or exudate  Chest: Almost no air movement or breath sounds on the left except for at the apex of the lung coarse breath sounds in right lung  CV: Tachycardic rate and regular rhythm normal S1-S2 no gallops murmurs or rubs  Abdomen: Protuberant nontender nondistended active bowel sounds no hepatosplenomegaly no masses no hernias  Extremities: No clubbing edema or cyanosis capillary refill is normal pulses are 2+ and symmetric at radial and dorsalis pedis posterior tibial pulses are intact and 2+ palpable  Neuro: Patient is intubated and sedated DTRs are 2+ and symmetric unable to perform her cooperate with rest of exam  Skin: Cool dry and intact no evidence of breakdown  Sensorium: Unable to assess  Nursing notes and vital signs reviewed        Results Reviewed:  Lab Results (last 24 hours)     Procedure Component Value Units Date/Time    Blood Gas, Arterial [[id number]]  (Abnormal) Collected:  01/15/19 2010    Specimen:  Arterial Blood Updated:  01/15/19 2013     Site Right Radial     Devon's Test Positive     pH, Arterial 7.327 pH units      Comment: 84 Value below reference range        pCO2, Arterial 57.3 mm Hg      Comment: 83 Value above reference range        pO2, Arterial 68.1 mm Hg      Comment: 84 Value below reference range        HCO3, Arterial 30.0 mmol/L      Comment: 83 Value above reference range        Base Excess, Arterial 2.7 mmol/L      Comment: 83 Value above reference range        O2 Saturation, Arterial 92.5 %      Comment: 84 Value below reference range        Temperature 37.0 C      Barometric  Pressure for Blood Gas 757 mmHg      Modality Ventilator     FIO2 100 %      Ventilator Mode AC     Set Tidal Volume 650     Set Mech Resp Rate 20.0     PEEP 5.0     Collected by [id number]     Comment: Meter: [id number]     :  [id number]       Respiratory Culture - Sputum, ET Suction [[id number]] Collected:  01/15/19 2009    Specimen:  Sputum from ET Suction Updated:  01/15/19 2013        Imaging Results (last 24 hours)     Procedure Component Value Units Date/Time    XR Chest 1 View [[id number]] Collected:  01/15/19 2003     Updated:  01/15/19 2008    Narrative:       EXAMINATION: Chest 1 view 1/15/2019     HISTORY: Respiratory failure. Mechanical ventilation     FINDINGS: Endotracheal tube projects at the T3 level. There is volume  loss on the left with mediastinal shift to the left. There is  consolidation within the medial aspect of the left upper lobe as well as  consolidation of the left lower lobe with silhouetting of the left  hemidiaphragm. It is difficult to ascertain whether this represents  atelectasis related to mucoid impaction versus a neoplastic process. I  would suggest follow-up with an enhanced CT of the chest for further  assessment. There is also a nodular opacity within the right midlung  zone which could be infectious in nature but which could also be  assessed at the time of CT imaging. The right lung is otherwise clear.       Impression:       1.. Consolidation and volume loss within both the left upper and left  lower lobe with silhouetting of the left diaphragm, heart and left  mediastinum. It is difficult to ascertain whether this represents mucoid  impaction with partial collapse of segments of the left lung versus a  neoplastic process. CT imaging with IV contrast is recommended.  2. Nodular opacity within the right midlung zone either related to  ongoing infiltrate versus a neoplastic process.  3. Endotracheal tube well-positioned.  This report was finalized on 01/15/2019 20:05  by Dr. Rudy Ahmadi MD.        I have personally reviewed and interpreted the radiology studies and ECG obtained at time of admission.     Assessment / Plan     Assessment:   1.  Community-acquired left upper and left lower lung field pneumonia possible mucus plugging  2.  Acute respiratory failure with hypoxia and hypercapnia requiring intubation and ventilation  3.  Type II diabetes with hyperglycemia  4.  Acute kidney injury with creatinine of 1.8.  GFR of 37 was 1.6 at [facility] hospital  5.  Sepsis  6.  Hypotension requiring pressors  7.  Poor venous access      Plan:    1.  Admit patient to intensive care unit continue gentle hydration and pressors will get CT scan of chest noncontrast change antibiotics to vancomycin and cefepime and Levaquin.  We will repeat cultures of blood and sputum will also get urine antigens for Legionella and strep pneumo.  Will also get pulmonary consultation patient may need bronchoscopy.  Also for ventilator management.  2.  Continue intubation and ventilation pulmonary consult as stated nebulizer treatments as needed mucolytic therapy.  We will place OG tube is well.  3.  Aggressive glucose management will start insulin drip once central line is placed per critical care protocol.  We sliding scale insulin in the meantime.  Check hemoglobin A1c serial Accu-Cheks.  4.  Check renal ultrasound monitor urine output patient has Hammonds catheter already in place will continue that.  Will gently hydrate and watch to try to avoid nephrotoxic agents. Consult nephrology if creatinine increases  5.  Patient has been bolused previously a do not believe he needs any more aggressive fluids and trying volume overloaded we will continue broad-spectrum antibiotics culture is obtained as above further plans as outlined above.  We will check lactic acid level.  6.  He was on dobutamine will change to Levophed.  7.  We will attempt to place central line if unable will place PICC order.   DVT  "prophylaxis with lovenox.    Code Status: Full    Patient seen prior to midnight     I discussed the patient's findings and my recommendations with the patient's wife and daughter.  His wife is his healthcare power advocate    Estimated length of stay to be determined    Emmanuel Palm MD   01/15/19   8:17 PM              Electronically signed by Emmanuel Palm MD at 1/16/2019  2:44 AM       Hospital Medications (active)       Dose Frequency Start End    acetaminophen (TYLENOL) suppository 650 mg 650 mg Every 4 Hours PRN 1/16/2019     Sig - Route: Insert 1 suppository into the rectum Every 4 (Four) Hours As Needed for Fever (temperature greater than 101.5 F or headache). - Rectal    Linked Group 1:  \"Or\" Linked Group Details        acetaminophen (TYLENOL) tablet 650 mg 650 mg Every 4 Hours PRN 1/16/2019     Sig - Route: Take 2 tablets by mouth Every 4 (Four) Hours As Needed for Headache or Fever (fever greater than 101.5 F). - Oral    Linked Group 1:  \"Or\" Linked Group Details        acetylcysteine (MUCOMYST) 20 % nebulizer solution 1.5 mL 1.5 mL 2 Times Daily - RT 1/23/2019     Sig - Route: Take 1.5 mL by nebulization 2 (Two) Times a Day. - Nebulization    albuterol (PROVENTIL) nebulizer solution 0.083% 2.5 mg/3mL 2.5 mg Once As Needed 1/16/2019     Sig - Route: Take 2.5 mg by nebulization 1 (One) Time As Needed for Shortness of Air (Sputum Induction). - Nebulization    alteplase ((CATHFLO/ACTIVASE)) 10 mg, dornase alpha (PULMOZYME) 5 mg, sodium chloride 0.9 % 35 mL compound 50 mL 2 Times Daily 1/24/2019 1/29/2019    Sig - Route: 50 mL by Intrapleural route 2 (Two) Times a Day. - Intrapleural    dextrose (D50W) 25 g/ 50mL Intravenous Solution 25-50 mL 25-50 mL Every 30 Minutes PRN 1/18/2019     Sig - Route: Infuse 25-50 mL into a venous catheter Every 30 (Thirty) Minutes As Needed for Low Blood Sugar (Blood Glucose <70 mg/dL; Per Insulin Infusion Protocol). - Intravenous    dextrose (GLUTOSE) oral gel 15 g " 15 g Every 15 Minutes PRN 1/16/2019     Sig - Route: Take 15 g by mouth Every 15 (Fifteen) Minutes As Needed for Low Blood Sugar (Blood sugar less than 70). - Oral    enoxaparin (LOVENOX) syringe 40 mg 40 mg Every 24 Hours 1/16/2019     Sig - Route: Inject 0.4 mL under the skin into the appropriate area as directed Daily. - Subcutaneous    famotidine (PEPCID) injection 20 mg 20 mg 2 Times Daily 1/16/2019     Sig - Route: Infuse 2 mL into a venous catheter 2 (Two) Times a Day. - Intravenous    fentaNYL citrate (PF) (SUBLIMAZE) injection 25 mcg 25 mcg Every 2 Hours PRN 1/18/2019 1/28/2019    Sig - Route: Infuse 0.5 mL into a venous catheter Every 2 (Two) Hours As Needed for Severe Pain . - Intravenous    glucagon (human recombinant) (GLUCAGEN DIAGNOSTIC) injection 1 mg 1 mg As Needed 1/16/2019     Sig - Route: Inject 1 mg under the skin into the appropriate area as directed As Needed (Blood Glucose Less Than 70). - Subcutaneous    insulin regular (HumuLIN R,NovoLIN R) 100 Units in sodium chloride 0.9 % 100 mL (1 Units/mL) infusion 1-20 Units/hr Titrated 1/18/2019     Sig - Route: Infuse 1-20 Units/hr into a venous catheter Dose Adjusted By Provider As Needed. - Intravenous    ipratropium (ATROVENT) nebulizer solution 0.5 mg 0.5 mg Every 4 Hours PRN 1/16/2019     Sig - Route: Take 2.5 mL by nebulization Every 4 (Four) Hours As Needed for Shortness of Air. - Nebulization    ipratropium-albuterol (DUO-NEB) nebulizer solution 3 mL 3 mL Every 4 Hours - RT 1/16/2019     Sig - Route: Take 3 mL by nebulization Every 4 (Four) Hours. - Nebulization    levoFLOXacin (LEVAQUIN) 750 mg/150 mL D5W (premix) (LEVAQUIN) 750 mg 750 mg Every 24 Hours 1/23/2019 1/30/2019    Sig - Route: Infuse 150 mL into a venous catheter Daily. - Intravenous    LORazepam (ATIVAN) injection 1 mg 1 mg Every 4 Hours PRN 1/22/2019 2/1/2019    Sig - Route: Infuse 0.5 mL into a venous catheter Every 4 (Four) Hours As Needed for Anxiety. - Intravenous     "meropenem (MERREM) 2 g in sodium chloride 0.9 % 100 mL IVPB 2 g Every 8 Hours 1/23/2019 1/30/2019    Sig - Route: Infuse 2 g into a venous catheter Every 8 (Eight) Hours. - Intravenous    norepinephrine (LEVOPHED) 1 MG/ML injection  - ADS Override Pull   1/24/2019 1/24/2019    Notes to Pharmacy: Created by cabinet override    norepinephrine (LEVOPHED) 8,000 mcg in Sodium chloride 0.9 % 250 mL (32 mcg/mL) infusion 0.02-0.3 mcg/kg/min × 154 kg Titrated 1/15/2019     Sig - Route: Infuse 3.08-46.2 mcg/min into a venous catheter Dose Adjusted By Provider As Needed. - Intravenous    ondansetron (ZOFRAN) injection 4 mg 4 mg Every 6 Hours PRN 1/16/2019     Sig - Route: Infuse 2 mL into a venous catheter Every 6 (Six) Hours As Needed for Nausea or Vomiting. - Intravenous    Linked Group 2:  \"Or\" Linked Group Details        ondansetron (ZOFRAN) tablet 4 mg 4 mg Every 6 Hours PRN 1/16/2019     Sig - Route: Take 1 tablet by mouth Every 6 (Six) Hours As Needed for Nausea or Vomiting. - Oral    Linked Group 2:  \"Or\" Linked Group Details        ondansetron ODT (ZOFRAN-ODT) disintegrating tablet 4 mg 4 mg Every 6 Hours PRN 1/16/2019     Sig - Route: Take 1 tablet by mouth Every 6 (Six) Hours As Needed for Nausea or Vomiting. - Oral    Linked Group 2:  \"Or\" Linked Group Details        polyethylene glycol 3350 powder (packet) 17 g Once 1/17/2019     Sig - Route: Take 17 g by mouth 1 (One) Time. - Oral    propofol (DIPRIVAN) infusion 10 mg/mL 100 mL 5-50 mcg/kg/min × 154 kg Titrated 1/15/2019     Sig - Route: Infuse 770-7,700 mcg/min into a venous catheter Dose Adjusted By Provider As Needed. - Intravenous    sodium chloride 0.9 % flush 3 mL 3 mL Every 12 Hours Scheduled 1/16/2019     Sig - Route: Infuse 3 mL into a venous catheter Every 12 (Twelve) Hours. - Intravenous    sodium chloride 0.9 % flush 3-10 mL 3-10 mL As Needed 1/16/2019     Sig - Route: Infuse 3-10 mL into a venous catheter As Needed for Line Care. - Intravenous    " sodium polystyrene (KAYEXALATE) powder 15 g 15 g Once 1/17/2019     Sig - Route: Take 15 g by mouth 1 (One) Time. - Oral    vancomycin 1250 mg/250 mL 0.9% NS IVPB ([facility]) 1,250 mg Every 12 Hours 1/22/2019 1/29/2019    Sig - Route: Infuse 250 mL into a venous catheter Every 12 (Twelve) Hours. - Intravenous    guaiFENesin (ROBITUSSIN) 100 MG/5ML oral solution 400 mg (Discontinued) 400 mg Every 8 Hours Scheduled 1/16/2019 1/24/2019    Sig - Route: Take 20 mL by mouth Every 8 (Eight) Hours. - Oral             Physician Progress Notes (last 72 hours) (Notes from 1/21/2019 12:12 PM through 1/24/2019 12:12 PM)      Tim Grey MD at 1/24/2019  8:23 AM              [facility] Medicine Services  INPATIENT PROGRESS NOTE    Patient Name: Alan Carlin  Date of Admission: 1/15/2019  Today's Date: 01/24/19  Length of Stay: 9  Primary Care Physician: Francy Dumont MD    Subjective   Chief Complaint: follow-up respiratory failure  HPI   Patient has had to have PEEP increased to 14 and Fi02 at 100% to maintain sats near 90%.  Family at bedside.  No significant residuals with tube feeds.  Remains sedated on propofol.  I had a conference with Dr. Oglesby yesterday; case discussed with Dr. Wright this morning.  Also discussed with spouse at bedside this morning.    Review of Systems     All pertinent negatives and positives are as above. All other systems have been reviewed and are negative unless otherwise stated.     Objective    Temp:  [97.5 °F (36.4 °C)-100.3 °F (37.9 °C)] 100.3 °F (37.9 °C)  Heart Rate:  [102-126] 111  Resp:  [20-26] 20  BP: ()/() 98/68  FiO2 (%):  [100 %] 100 %  Physical Exam   Constitutional: No distress.   Sedated on propofol; on mechanical ventilation   HENT:   Head: Normocephalic.   Mouth/Throat: No oropharyngeal exudate (ET tube in place).   Eyes: Pupils are equal, round, and reactive to light. No scleral icterus.   Neck: No tracheal deviation present.    Cardiovascular: Normal rate and regular rhythm.   Pulmonary/Chest: Effort normal. He has rales.   On 100% Fi02 and 14 PEEP; chest tube in place on the left   Abdominal: Soft. He exhibits no distension. There is no tenderness. There is no rebound.   Genitourinary:   Genitourinary Comments: Hammonds in place; small blistering lesions noted at urethral meatus insertion site of Hammonds catheter   Musculoskeletal: He exhibits edema.   LUE PICC; edema noted to RUE   Neurological: He is alert.   On propofol for sedation   Skin: Skin is warm and dry. He is not diaphoretic.   Vitals reviewed.      Results Review:  I have reviewed the labs, radiology results, and diagnostic studies.    Laboratory Data:   Results from last 7 days   Lab Units 01/24/19  0632 01/23/19  0308 01/22/19  0257   WBC 10*3/mm3 24.26* 17.30* 16.47*   HEMOGLOBIN g/dL 11.7* 11.9* 11.6*   HEMATOCRIT % 38.2* 38.8* 38.0*   PLATELETS 10*3/mm3 292 334 312        Results from last 7 days   Lab Units 01/24/19  0632 01/23/19  0308 01/22/19  0257  01/19/19  0230 01/18/19  0418   SODIUM mmol/L 141 145 146*   < > 144 140   POTASSIUM mmol/L 4.8 4.4 4.7   < > 4.5 5.4*   CHLORIDE mmol/L 102 105 105   < > 104 102   CO2 mmol/L 32.0* 33.0* 32.0*   < > 30.0 31.0   BUN mg/dL 56* 31* 41*   < > 76* 72*   CREATININE mg/dL 1.10 0.73 0.75   < > 1.07 0.96   CALCIUM mg/dL 8.8 8.9 8.9   < > 8.8 8.6   BILIRUBIN mg/dL  --  0.4  --   --  0.3 0.4   ALK PHOS U/L  --  76  --   --  74 77   ALT (SGPT) U/L  --  32  --   --  30 34   AST (SGOT) U/L  --  26  --   --  42 42   GLUCOSE mg/dL 169* 99 132*   < > 160* 348*    < > = values in this interval not displayed.       Culture Data:   Blood Culture   Date Value Ref Range Status   01/16/2019 No growth at 5 days  Final   01/16/2019 No growth at 5 days  Final     MRSA SCREEN CX   Date Value Ref Range Status   01/16/2019   Final    No Methicillin Resistant Staphylococcus aureus isolated     Respiratory Culture   Date Value Ref Range Status    01/15/2019 Rare Normal Respiratory Ana Lilia  Final   01/15/2019 Rare Candida albicans (A)  Final       Radiology Data:   Imaging Results (last 24 hours)     Procedure Component Value Units Date/Time    XR Chest 1 View [[id number]] Collected:  01/24/19 0748     Updated:  01/24/19 0752    Narrative:       EXAMINATION: XR CHEST 1 VW- 1/24/2019 7:48 AM CST     HISTORY: Chest tube, intubated     COMPARISON: 1/23/2019     FINDINGS:  Endotracheal tube is in place with tip above the cesar. Enteric tube is  in place with tip below the diaphragm and out of the field-of-view. A  chest tube is in place with tip directed at the medial left lung base.  Diffuse airspace opacities are again seen bilaterally, similar in  appearance to the previous exam. No definite pneumothorax is identified  on this exam. There is loculated pleural fluid in the upper left lung  field.       Impression:       Stable one day appearance of the chest.  This report was finalized on 01/24/2019 07:49 by Dr. Anoop Brunson MD.          I have reviewed the patient's current medications.     Assessment/Plan     Active Hospital Problems    Diagnosis   • PNA (pneumonia)   • Acute respiratory failure with hypercapnia (CMS/HCC)   • Controlled type 2 diabetes mellitus without complication, with long-term current use of insulin (CMS/HCC)     1) Acute hypoxic and acute on chronic hypercapnia respiratory failure due to left-sided pleural effusion and suspected empyema and right lower lobe pneumonia   2) Septic shock due to community-acquired pneumonia and empyema  3) Acute kidney injury, resolved  4) Morbid obesity   5) Normocytic anemia  6) Suppressed TSH, normal Free T4  7) Type 2 DM complicated by hyperglycemia  8) Hyperkalemia, improved  9) Constipation, resolved   10) Hypernatremia    Plan:  1.  Now on IV Vanco, Levaquin, and Merrem  2.  Was on Levaquin (received 3 days of tx) and Cefepime (received 4 days of tx); then transitioned to IV Zosyn for approx 5 days;  then transitioned to IV Merrem and added back Levaquin on 1/23  3.  Appreciate CT Surg and Pulmonary input; case discussed with Dr. Oglesby and Dr. Wright within past 24hrs  4.  Certainly a difficult decision and very, very high risk regarding proceeding to surgery (VATS) in the setting of Fi02 at 100% and PEEP 14.  Pulmozyme and alteplase restarted.  5.  Follow-up cultures; currently negative  6.  Lovenox/Pepcid PPx  7.  Nebs scheduled; trial of Mucomyst nebs started by Pulmonary  8.  Last received Lasix on 1/19; repeat dose of IV Lasix given 1/23  9.  TFs currently at 35ml/hr; minimal residuals per nursing  10.  Insulin gtt per protocol for tight control of BSs  11.  ICU care  12.  Patient is critically ill  13.  Updated spouse at bedside this AM; will start contacting tertiary care medical centers this AM regarding arranging transfer.  Family understands risks of transferring in this high risk setting.      Tim Grey MD   01/24/19   8:23 AM    Electronically signed by Tim Grey MD at 1/24/2019  8:42 AM     Reji Robledo APRN at 1/24/2019  8:20 AM              PULMONARY AND CRITICAL CARE PROGRESS NOTE - [facility]    Patient: Alan Carlin    1960    MR# [id number]    Acct# [id number]  01/24/19   9:45 AM  Referring Provider: Tim Grey MD    Chief Complaint: Mechanically ventilated    Interval history: He remains intubated and sedated.  FiO2 remains at 100% and PEEP 14.  Chest x-ray is stable. Chest tube remains in place.   He had a temperature overnight.  His wife is at bedside this morning and has been updated.   Nutrition is being addressed.    Meds:    acetylcysteine 1.5 mL Nebulization BID - RT   custom medication builder 50 mL Intrapleural BID   enoxaparin 40 mg Subcutaneous Q24H   famotidine 20 mg Intravenous BID   guaiFENesin 400 mg Oral Q8H   ipratropium-albuterol 3 mL Nebulization Q4H - RT   levoFLOXacin 750 mg Intravenous Q24H    meropenem 2 g Intravenous Q8H   polyethylene glycol 17 g Oral Once   sodium chloride 3 mL Intravenous Q12H   sodium polystyrene 15 g Oral Once   vancomycin 1,250 mg Intravenous Q12H       insulin regular infusion 1 unit/mL 1-20 Units/hr Last Rate: 13 Units/hr (01/24/19 0859)   norepinephrine 0.02-0.3 mcg/kg/min Last Rate: Stopped (01/17/19 1730)   propofol 5-50 mcg/kg/min Last Rate: 50 mcg/kg/min (01/24/19 0858)     Review of Systems:   Cannot obtain due to mechanical ventilation.  The patient notably is critically ill and connected to a ventilator.  As such patient cannot communicate and provide any history whatsoever, including any history of present illness or interval history since arrival or review of systems. The interested reviewer may note this fact, as an attempt has been made at collecting and documenting these portions of the patient history, but this information is unobtainable despite attempted review and therefore cannot be documented at this time.     Ventilator Settings:     Vt (Set, L): 0.6 L  Resp Rate (Set): 20  Pressure Support (cm H2O): 0 cm H20  FiO2 (%): 100 %  PEEP/CPAP (cm H2O): 14 cm H20  Minute Ventilation (L/min) (Obs): 13.4 L/min  Resp Rate (Observed) Vent: 22  I:E Ratio (Set): 1.10:1  I:E Ratio (Obs): 1:1.3  PIP Observed (cm H2O): 36 cm H2O     Physical Exam:  Temp:  [97.5 °F (36.4 °C)-100.3 °F (37.9 °C)] 100.3 °F (37.9 °C)  Heart Rate:  [102-126] 111  Resp:  [20-26] 20  BP: ()/() 98/68  FiO2 (%):  [100 %] 100 %    Intake/Output Summary (Last 24 hours) at 1/24/2019 0904  Last data filed at 1/24/2019 0755  Gross per 24 hour   Intake 4196.28 ml   Output 2270 ml   Net 1926.28 ml     SpO2 Percentage    01/24/19 0730 01/24/19 0738 01/24/19 0740   SpO2: (!) 89% 90% (!) 89%      Physical Exam:    Constitutional: He appears well-developed and well-nourished. No distress. Sedated and intubated.  Obese.  HENT: ET tube in place, OG with nutrition infusing.  Head: Normocephalic and  atraumatic.   Eyes: Pupils are equal, round, and reactive to light. Right eye exhibits no discharge. Left eye exhibits no discharge.   Neck: Normal range of motion. Neck supple.   thick   Cardiovascular:  Tachycardia.   No murmur heard.  Pulmonary/Chest: He has decreased breath sounds in the left upper field and the left lower field.  bilateral rales.     left-sided chest tube in place with minimal drainage  Abdominal: Soft. Bowel sounds are normal.  Abdomen is obese.    Musculoskeletal: He exhibits edema, increased to hands. He exhibits no deformity.   Neurological:   Intubated and sedated    Skin: Skin is warm and dry. No rash noted. He is not diaphoretic. No erythema.   Psychiatric: He is sedated.  Nursing note and vitals reviewed.        Results from last 7 days   Lab Units 01/24/19  0632 01/23/19  0308 01/22/19  0257   WBC 10*3/mm3 24.26* 17.30* 16.47*   HEMOGLOBIN g/dL 11.7* 11.9* 11.6*   PLATELETS 10*3/mm3 292 334 312     Results from last 7 days   Lab Units 01/24/19  0632 01/23/19  0308 01/22/19  0257   SODIUM mmol/L 141 145 146*   POTASSIUM mmol/L 4.8 4.4 4.7   BUN mg/dL 56* 31* 41*   CREATININE mg/dL 1.10 0.73 0.75     Results from last 7 days   Lab Units 01/24/19  0348 01/23/19  1245 01/23/19  1130   PH, ARTERIAL pH units 7.375 7.368 7.403   PCO2, ARTERIAL mm Hg 55.0* 59.7* 55.2*   PO2 ART mm Hg 68.0* 85.6 62.0*   FIO2 % 100 100 100     Blood Culture   Date Value Ref Range Status   01/16/2019 No growth at 24 hours  Preliminary   01/16/2019 No growth at 24 hours  Preliminary     Respiratory Culture   Date Value Ref Range Status   01/15/2019 Rare Normal Respiratory Ana Lilia  Final   01/15/2019 Rare Candida albicans (A)  Final     Recent films:  Xr Chest 1 View    Result Date: 1/24/2019  Stable one day appearance of the chest. This report was finalized on 01/24/2019 07:49 by Dr. Aonop Brunson MD.    Xr Chest 1 View    Result Date: 1/23/2019  Impression: 1.   Increasing bilateral parenchymal opacities,  "concerning for worsening findings of infection..   This report was finalized on 2019 07:35 by Dr. Shanice Tabor MD.    Films reviewed personally by me.  My interpretation: Endotracheal tube in position, stable bilateral infiltrates    Pulmonary Assessment:    1. Acute hypoxemic respiratory fair requiring mechanical ventilation  2. Empyema, left  3. Type II diabetes  4. Thyroid disease  5. Acute kidney injury  6. Hypotension, resolved   7. Sepsis  8. Leukocytosis, worsening     Recommend:     · No vent changes, he continues to require 100% FiO2 and 14 of PEEP  · Continue mucomyst BID for thick lung secretions.   · Chest tube management per Dr. Oglesby with plans to add back TPA.  · Continue nebs scheduled  · Chest x-ray and ABG daily while on the ventilator  · Stress ulcer and DVT prophylaxis  · Antibiotics: Merrem, Vanc and Levaquin  · His wife was updated at bedside this morning.     Electronically signed by LUIS E Huerta on 2019 at 9:45 AM        Electronically signed by Reji Robledo APRN at 2019 10:00 AM     Berlin Oglesby MD at 2019  5:28 AM          Patient: Alan Carlin  : 1960     Procedure:     Procedure Date:     POD: * No surgery found *      Subjective   No change.  Sedated on ventilator requiring PEEP of 14 and FiO2 100%    Objective   Visit Vitals  BP (!) 150/133   Pulse 105   Temp 97.5 °F (36.4 °C) (Axillary)   Resp 20   Ht 175.3 cm (69\")   Wt (!) 156 kg (343 lb 0.6 oz)   SpO2 91%   BMI 50.66 kg/m²       Intake/Output Summary (Last 24 hours) at 2019 0528  Last data filed at 2019 0416  Gross per 24 hour   Intake 4842.35 ml   Output 2310 ml   Net 2532.35 ml                                        Lab Results:    CBC:   Results from last 7 days   Lab Units 19  0308 19  0257 19  0256   WBC 10*3/mm3 17.30* 16.47* 14.59*   HEMATOCRIT % 38.8* 38.0* 36.5*   PLATELETS 10*3/mm3 334 312 294     BMP:   Results from last 7 " days   Lab Units 01/23/19  0308 01/22/19  0257 01/21/19 0256   SODIUM mmol/L 145 146* 145   POTASSIUM mmol/L 4.4 4.7 4.8   CHLORIDE mmol/L 105 105 107   CO2 mmol/L 33.0* 32.0* 33.0*   GLUCOSE mg/dL 99 132* 139*   BUN mg/dL 31* 41* 66*   CREATININE mg/dL 0.73 0.75 0.80     Coag:   Results from last 7 days   Lab Units 01/21/19 0256   INR  1.41*       Images:  Imaging Results (last 24 hours)     Procedure Component Value Units Date/Time    XR Chest 1 View [[id number]] Updated:  01/24/19 0405    XR Chest 1 View [[id number]] Collected:  01/23/19 0734     Updated:  01/23/19 0738    Narrative:       Frontal supine radiograph of the chest 1/23/2019 4:32 AM CST     History: intubated; A41.9-Sepsis, unspecified organism; R65.21-Severe  sepsis with septic shock; Z74.09-Other reduced mobility     Comparison: Chest x-ray dated 1/22/2019      Findings:   Lines and tubes are stable in position. Bilateral parenchymal opacities  have increased.. The cardiomediastinal silhouette and pulmonary  vascularity are unchanged.       No acute osseous or soft tissue abnormality is noted.        Impression:       Impression:   1.   Increasing bilateral parenchymal opacities, concerning for  worsening findings of infection..        This report was finalized on 01/23/2019 07:35 by Dr. Shanice Tabor MD.        Images Today: Not much change in images today.  It looks somewhat better but may be just due to the technique of the x-ray.  Right lung with significant pneumonia.  Left lung with continued atelectasis/effusion     Physical Exam:   Gen.: Sedated on ventilator.  Chest: Decreased sounds bilaterally with multiple rales and wheezes  Heart: Sinus tachycardia without murmur gallop or rub.    Impression: Unable to make much progress.  He continues to need more PEEP and FiO2 is 100%.  Continues to have a worsening pneumonia on the right plus his significant lung collapse/empyema on the left.  Dr. Grey did broaden the range of antibiotics  yesterday.  We have not had any positive cultures to help direct the antibiotic coverage.  Worsening, his right lung is so bad that any operative procedure on the left is occluded at the present time.  I had a long talk with the family yesterday and again this morning.  Talked with Dr. Grey yesterday and he is going to talk with the family about entertaining the [facility] be [facility].  Prognosis is somewhat poor.    Plan: As above    Berlin Oglesby MD  01/24/19  5:28 AM          Electronically signed by Berlin Oglesby MD at 1/24/2019  5:40 AM     Damion Wright MD at 1/23/2019  7:55 AM              PULMONARY AND CRITICAL CARE PROGRESS NOTE - [facility]    Patient: Alan Carlin    1960    MR# [id number]    Acct# [id number]  01/23/19   10:22 AM  Referring Provider: Tim Grey MD    Chief Complaint: Mechanically ventilated    Interval history: He remains intubated and sedated.  FiO2 has been increased to 100% and he remains on 12.5 of PEEP.  Chest x-ray shows worsening bilateral infiltrates.  His wife is at bedside this morning and has been updated.   Nutrition is being addressed.    Meds:    enoxaparin 40 mg Subcutaneous Q24H   famotidine 20 mg Intravenous BID   guaiFENesin 400 mg Oral Q8H   ipratropium-albuterol 3 mL Nebulization Q4H - RT   levoFLOXacin 750 mg Intravenous Q24H   meropenem 2 g Intravenous Q8H   polyethylene glycol 17 g Oral Once   sodium chloride 3 mL Intravenous Q12H   sodium polystyrene 15 g Oral Once   vancomycin 1,250 mg Intravenous Q12H       insulin regular infusion 1 unit/mL 1-20 Units/hr Last Rate: 3 Units/hr (01/23/19 0813)   norepinephrine 0.02-0.3 mcg/kg/min Last Rate: Stopped (01/17/19 1730)   propofol 5-50 mcg/kg/min Last Rate: 40 mcg/kg/min (01/23/19 0904)     Review of Systems:   Cannot obtain due to mechanical ventilation.  The patient notably is critically ill and connected to a  ventilator.  As such patient cannot communicate and provide any history whatsoever, including any history of present illness or interval history since arrival or review of systems. The interested reviewer may note this fact, as an attempt has been made at collecting and documenting these portions of the patient history, but this information is unobtainable despite attempted review and therefore cannot be documented at this time.     Ventilator Settings:     Vt (Set, L): 0.6 L  Resp Rate (Set): 18  Pressure Support (cm H2O): 0 cm H20  FiO2 (%): 100 %  PEEP/CPAP (cm H2O): 12.5 cm H20  Minute Ventilation (L/min) (Obs): 13.5 L/min  Resp Rate (Observed) Vent: 22  I:E Ratio (Set): 1:1.40  I:E Ratio (Obs): 1:1.4  PIP Observed (cm H2O): 31 cm H2O     Physical Exam:  Temp:  [99.2 °F (37.3 °C)-100.4 °F (38 °C)] 99.4 °F (37.4 °C)  Heart Rate:  [] 103  Resp:  [13-33] 25  BP: ()/(60-97) 108/67  FiO2 (%):  [80 %-100 %] 100 %    Intake/Output Summary (Last 24 hours) at 1/23/2019 1022  Last data filed at 1/23/2019 1005  Gross per 24 hour   Intake 4032.99 ml   Output 1445 ml   Net 2587.99 ml     SpO2 Percentage    01/23/19 0732 01/23/19 0748 01/23/19 0847   SpO2: 91% 92% 92%      Physical Exam:    Constitutional: He appears well-developed and well-nourished. No distress. Sedated and intubated.  Obese.  HENT: ET tube in place, OG with nutrition infusing.  Head: Normocephalic and atraumatic.   Eyes: Pupils are equal, round, and reactive to light. Right eye exhibits no discharge. Left eye exhibits no discharge.   Neck: Normal range of motion. Neck supple.   thick   Cardiovascular:  Tachycardia.   No murmur heard.  Pulmonary/Chest: He has decreased breath sounds in the left upper field and the left lower field.  bilateral rales.     left-sided chest tube in place with minimal drainage  Abdominal: Soft. Bowel sounds are normal.  Abdomen is obese.    Musculoskeletal: He exhibits edema. He exhibits no deformity.   Neurological:    Intubated and sedated    Skin: Skin is warm and dry. No rash noted. He is not diaphoretic. No erythema.   Psychiatric: He is sedated.  Nursing note and vitals reviewed.        Results from last 7 days   Lab Units 01/23/19  0308 01/22/19  0257 01/21/19  0256   WBC 10*3/mm3 17.30* 16.47* 14.59*   HEMOGLOBIN g/dL 11.9* 11.6* 11.5*   PLATELETS 10*3/mm3 334 312 294     Results from last 7 days   Lab Units 01/23/19  0308 01/22/19  0257 01/21/19  0256   SODIUM mmol/L 145 146* 145   POTASSIUM mmol/L 4.4 4.7 4.8   BUN mg/dL 31* 41* 66*   CREATININE mg/dL 0.73 0.75 0.80     Results from last 7 days   Lab Units 01/23/19  0440 01/22/19  0225 01/21/19  2109   PH, ARTERIAL pH units 7.432 7.466* 7.446   PCO2, ARTERIAL mm Hg 50.2* 47.0* 49.2*   PO2 ART mm Hg 75.7* 66.8* 85.8   FIO2 % 100 80 80     Blood Culture   Date Value Ref Range Status   01/16/2019 No growth at 24 hours  Preliminary   01/16/2019 No growth at 24 hours  Preliminary     Respiratory Culture   Date Value Ref Range Status   01/15/2019 Rare Normal Respiratory Ana Lilia  Final   01/15/2019 Rare Candida albicans (A)  Final     Recent films:  Ct Chest Without Contrast    Result Date: 1/22/2019  1. Complete left lower lobe collapse with partial left upper lobe collapse, in part due to the adjacent multiloculated left pleural effusion/empyema. A left chest tube is seen in the left lung base, with the largest remaining component of the effusion/empyema now seen at the left apex. 2. Dense consolidation in the dependent portions of the right upper and right lower lobes with additional inflammatory nodularity throughout all 3 lobes on the right. The inflammatory nodularity in particular raises suspicion for a developing infectious process in right lung. 3. Endotracheal tube and enteric tube appear in good position. This report was finalized on 01/22/2019 07:53 by Dr Handy Avery, .    Xr Chest 1 View    Result Date: 1/23/2019  Impression: 1.   Increasing bilateral parenchymal  opacities, concerning for worsening findings of infection..   This report was finalized on 01/23/2019 07:35 by Dr. Shanice Tabor MD.    Xr Chest 1 View    Result Date: 1/22/2019  1. Diffuse increased consolidation throughout the right lung from the prior exams, suspicious for developing infectious infiltrate, edema or perhaps atelectasis. 2. Appearance of the left lung is stable. This report was finalized on 01/22/2019 07:44 by Dr Handy Avery, .    Films reviewed personally by me.  My interpretation: Endotracheal tube in position, increased bilateral infiltrates    Pulmonary Assessment:    9. Acute hypoxemic respiratory fair requiring mechanical ventilation  10. Empyema, left  11. Type II diabetes  12. Thyroid disease  13. Acute kidney injury  14. Hypotension requiring pressors  15. Sepsis  16. Leukocytosis    Recommend:     · Changed from AC/PC to AC/VC, increased peep to 14.  · F/u ABG's around 1230. Call with results.   · Added mucomyst BID for thick lung secretions.   · Chest tube management per Dr. Oglesby  · Continue nebs scheduled  · Chest x-ray and ABG daily while on the ventilator  · Stress ulcer and DVT prophylaxis  · Antibiotics changed to Merrem and Levaquin  · Altepase and Pulmozyme per CT surgery   · His wife was updated at bedside this morning.  She reports that she and her daughters have talked extensively and hope that the patient will be able to proceed with some type of surgical intervention however, she states understanding that he is critically ill and may not be a candidate for surgery at this time.  She has discussed her wishes with the attending this morning as well and hopes to have some type of conference with Dr. Oglesby.    Electronically signed by LUIS E Huerta on 1/23/2019 at 10:22 AM    Pt was visited twice by me.  In between visits he desaturated and peep was increased to 14 with improvement in oxygenation.  Chest has diminished breath sounds bilat.  Discussed with  his family.  Prognosis seems poor.  No room for any additional decompensation.  I have seen and examined patient personally, performing a face-to-face diagnostic evaluation with plan of care reviewed and developed with APRN and nursing staff. I have addended and/or modified the above history of present illness, physical examination, and assessment and plan to reflect my findings and impressions. Essential elements of the care plan were discussed with APRN above.  Agree with findings and assessment/plan as documented above.    Electronically signed by Damion Wright MD, on 1/23/2019, 11:15 PM    EMR Dragon/Transcription disclaimer: Much of this encounter note is an electronic transcription/translation of spoken language to printed text. The electronic translation of spoken language may permit erroneous, or at times, nonsensical words or phrases to be inadvertently transcribed; although I have reviewed the note for such errors, some may still exist.        Electronically signed by Damion Wright MD at 1/23/2019 11:18 PM     Tim Grey MD at 1/23/2019  7:49 AM              [facility] Medicine Services  INPATIENT PROGRESS NOTE    Patient Name: Alan Carlin  Date of Admission: 1/15/2019  Today's Date: 01/23/19  Length of Stay: 8  Primary Care Physician: Francy Dumont MD    Subjective   Chief Complaint: follow-up respiratory failure  HPI   Patient had a significant amount of respiratory secretions from the ventilator last night; this seemed to coincide with the administration of guaifenesin per nursing report.  That medication has subsequently been put on hold.  Patient did require that his FiO2 increased to 100%; PEEP still at 12.  No significant residuals from tube feeds.  Nursing reports the patient wakes up and follows commands when sedation is held.  Family at bedside.    Review of Systems     All pertinent negatives and positives are as above. All other  systems have been reviewed and are negative unless otherwise stated.     Objective    Temp:  [99.2 °F (37.3 °C)-100.4 °F (38 °C)] 100.3 °F (37.9 °C)  Heart Rate:  [] 108  Resp:  [13-33] 24  BP: ()/(57-97) 106/60  FiO2 (%):  [80 %-100 %] 100 %  Physical Exam   Constitutional: No distress.   Sedated on propofol; on mechanical ventilation   HENT:   Head: Normocephalic.   Mouth/Throat: No oropharyngeal exudate (ET tube in place).   Eyes: Pupils are equal, round, and reactive to light. No scleral icterus.   Neck: No tracheal deviation present.   Cardiovascular: Normal rate and regular rhythm.   Pulmonary/Chest: Effort normal. He has rales.   On 100% Fi02 and 12 PEEP; BSs more diminished on left as compared to right; chest tube in place on the left   Abdominal: Soft. He exhibits no distension. There is no tenderness. There is no rebound.   Genitourinary:   Genitourinary Comments: Hammodns in place   Musculoskeletal: He exhibits edema.   LUE PICC; edema noted to RUE   Neurological: He is alert.   On propofol for sedation   Skin: Skin is warm and dry. He is not diaphoretic.   Vitals reviewed.      Results Review:  I have reviewed the labs, radiology results, and diagnostic studies.    Laboratory Data:   Results from last 7 days   Lab Units 01/23/19  0308 01/22/19  0257 01/21/19  0256   WBC 10*3/mm3 17.30* 16.47* 14.59*   HEMOGLOBIN g/dL 11.9* 11.6* 11.5*   HEMATOCRIT % 38.8* 38.0* 36.5*   PLATELETS 10*3/mm3 334 312 294        Results from last 7 days   Lab Units 01/23/19  0308 01/22/19  0257 01/21/19  0256  01/19/19  0230 01/18/19  0418   SODIUM mmol/L 145 146* 145   < > 144 140   POTASSIUM mmol/L 4.4 4.7 4.8   < > 4.5 5.4*   CHLORIDE mmol/L 105 105 107   < > 104 102   CO2 mmol/L 33.0* 32.0* 33.0*   < > 30.0 31.0   BUN mg/dL 31* 41* 66*   < > 76* 72*   CREATININE mg/dL 0.73 0.75 0.80   < > 1.07 0.96   CALCIUM mg/dL 8.9 8.9 8.9   < > 8.8 8.6   BILIRUBIN mg/dL 0.4  --   --   --  0.3 0.4   ALK PHOS U/L 76  --   --    --  74 77   ALT (SGPT) U/L 32  --   --   --  30 34   AST (SGOT) U/L 26  --   --   --  42 42   GLUCOSE mg/dL 99 132* 139*   < > 160* 348*    < > = values in this interval not displayed.       Culture Data:   Blood Culture   Date Value Ref Range Status   01/16/2019 No growth at 5 days  Final   01/16/2019 No growth at 5 days  Final     MRSA SCREEN CX   Date Value Ref Range Status   01/16/2019   Final    No Methicillin Resistant Staphylococcus aureus isolated     Respiratory Culture   Date Value Ref Range Status   01/15/2019 Rare Normal Respiratory Ana Lilia  Final   01/15/2019 Rare Candida albicans (A)  Final       Radiology Data:   Imaging Results (last 24 hours)     Procedure Component Value Units Date/Time    XR Chest 1 View [[id number]] Collected:  01/23/19 0734     Updated:  01/23/19 0738    Narrative:       Frontal supine radiograph of the chest 1/23/2019 4:32 AM CST     History: intubated; A41.9-Sepsis, unspecified organism; R65.21-Severe  sepsis with septic shock; Z74.09-Other reduced mobility     Comparison: Chest x-ray dated 1/22/2019      Findings:   Lines and tubes are stable in position. Bilateral parenchymal opacities  have increased.. The cardiomediastinal silhouette and pulmonary  vascularity are unchanged.       No acute osseous or soft tissue abnormality is noted.        Impression:       Impression:   1.   Increasing bilateral parenchymal opacities, concerning for  worsening findings of infection..        This report was finalized on 01/23/2019 07:35 by Dr. Shanice Tabor MD.    CT Chest Without Contrast [[id number]] Collected:  01/22/19 0745     Updated:  01/22/19 0756    Narrative:       CT CHEST WO CONTRAST- 1/22/2019 6:02 AM CST     HISTORY: Pleural effusion; A41.9-Sepsis, unspecified organism;  R65.21-Severe sepsis with septic shock; Z74.09-Other reduced mobility     COMPARISON: Multiple prior chest x-rays, the most recent which is dated  1/22/2019     DOSE LENGTH PRODUCT: 538 mGy cm. Automated  exposure control was also  utilized to decrease patient radiation dose.     TECHNIQUE: Serial helical tomographic images of the chest were acquired.  Multiplanar reformatted images were provided for review.     FINDINGS:  The imaged portion of the neck and thyroid gland is unremarkable.      A left-sided pleural drain is identified in the left lung base. There is  essentially complete collapse of the left lower lobe with partial left  upper lobe collapse. There is a multiloculated effusion in the left  pleural space, the largest component of which is seen along the  posterior costal pleura in the left apex. Dependent peripheral  consolidations are seen in the right lower lobe and right upper lobe as  well and there is a small inflammatory nodularity throughout the right  upper, middle and lower lobes. No right-sided pleural effusion. There is  an endotracheal tube which appears in good position. Airways are  otherwise clear.     The heart is normal in size. Thoracic aorta is normal in caliber.  Central pulmonary arteries are mildly dilated, with the main pulmonary  artery measuring up to 34 mm in greatest axial diameter. No pericardial  effusion. There is moderate coronary artery atheromatous calcification.  No enlarged axillary or mediastinal lymph nodes are present.      No acute findings are seen in the bones or surrounding soft tissues.     No acute findings are seen in the visualized portion of the upper  abdomen. Cholelithiasis. Enteric tube is in good position.       Impression:       1. Complete left lower lobe collapse with partial left upper lobe  collapse, in part due to the adjacent multiloculated left pleural  effusion/empyema. A left chest tube is seen in the left lung base, with  the largest remaining component of the effusion/empyema now seen at the  left apex.  2. Dense consolidation in the dependent portions of the right upper and  right lower lobes with additional inflammatory nodularity  throughout all  3 lobes on the right. The inflammatory nodularity in particular raises  suspicion for a developing infectious process in right lung.  3. Endotracheal tube and enteric tube appear in good position.  This report was finalized on 01/22/2019 07:53 by Dr Handy Avery, .    XR Chest 1 View [[id number]] Collected:  01/22/19 0740     Updated:  01/22/19 0753    Narrative:       XR CHEST 1 VW- 1/22/2019 3:25 AM CST     HISTORY: chest tube, vent; A41.9-Sepsis, unspecified organism;  R65.21-Severe sepsis with septic shock; Z74.09-Other reduced mobility       COMPARISON: 1/21/2019.     FINDINGS:      Endotracheal tube is in good position. There is a left chest tube in the  left lung base, paralleling the left hemidiaphragm. There continues to  be opacification of the left lung base with obscuration of the left  hemidiaphragm and a fluid meniscus along the left chest wall. There is  dense opacification also of the left apex though it appears to be a  loculated effusion at the apex. There is increasing consolidation  throughout the right lung is well, affecting both the upper and lower  lung fields. There may be a trace effusion in the pleural space at the  right apex. An enteric tube is identified, the tip being difficult to  see on this exam.     The osseous structures and surrounding soft tissues demonstrate no acute  abnormality.       Impression:       1. Diffuse increased consolidation throughout the right lung from the  prior exams, suspicious for developing infectious infiltrate, edema or  perhaps atelectasis.  2. Appearance of the left lung is stable.  This report was finalized on 01/22/2019 07:44 by Dr Handy Avery, .          I have reviewed the patient's current medications.     Assessment/Plan     Active Hospital Problems    Diagnosis   • PNA (pneumonia)   • Acute respiratory failure with hypercapnia (CMS/HCC)   • Controlled type 2 diabetes mellitus without complication, with long-term current use of  insulin (CMS/HCC)     1) Acute hypoxic and acute on chronic hypercapnia respiratory failure due to left-sided pleural effusion and suspected empyema and right lower lobe community-acquired pneumonia   2) Septic shock due to community-acquired pneumonia and empyema  3) Acute kidney injury, resolved  4) Morbid obesity   5) Normocytic anemia  6) Suppressed TSH, normal Free T4  7) Type 2 DM complicated by hyperglycemia  8) Hyperkalemia, improved  9) Constipation, resolved   10) Hypernatremia    Plan:  1.  Added Vancomycin back last PM  2.  Was on Levaquin (received 3 days of tx) and Cefepime (received 4 days of tx); has been on IV Zosyn for approx 5 days; I am going to transition to IV Merrem and add back Levaquin as well  3.  Appreciate CT Surg and Pulmonary input  4.  Certainly a difficult decision and very, very high risk regarding proceeding to surgery (VATS) in the setting of Fi02 at 100% and PEEP 12.  Other consideration would be placing additional chest tube in different location with hopes of finding a pocket that would drain more adequately.  Pulmozyme and alteplase restarted.  5.  Follow-up cultures; currently negative  6.  Lovenox/Pepcid PPx  7.  Nebs scheduled  8.  Last received Lasix on ; repeat dose of IV Lasix today  9.  TFs currently at 40ml/hr; minimal residuals per nursing  10.  Insulin gtt per protocol for tight control of BSs  11.  ICU care  12.  Patient is critically ill  13.  Updated spouse at bedside this AM      Tim Grey MD   19   7:49 AM    Electronically signed by Tim Grey MD at 2019  8:27 AM     Berlin Oglesby MD at 2019  5:29 AM          Patient: Alan Carlin  : 1960     Procedure:     Procedure Date:     POD: * No surgery found *      Subjective   Still sedated.  When given medication profile he acts appropriately.  Had to go up 200% O2 sat.    Objective   Visit Vitals  /77   Pulse 114   Temp 100.3 °F (37.9 °C) (Axillary)  "  Resp 28   Ht 175.3 cm (69\")   Wt (!) 156 kg (343 lb 0.6 oz)   SpO2 94%   BMI 50.66 kg/m²       Intake/Output Summary (Last 24 hours) at 1/23/2019 0529  Last data filed at 1/23/2019 0400  Gross per 24 hour   Intake 2857.51 ml   Output 1650 ml   Net 1207.51 ml                                        Lab Results:    CBC:   Results from last 7 days   Lab Units 01/23/19  0308 01/22/19 0257 01/21/19  0256   WBC 10*3/mm3 17.30* 16.47* 14.59*   HEMATOCRIT % 38.8* 38.0* 36.5*   PLATELETS 10*3/mm3 334 312 294     BMP:   Results from last 7 days   Lab Units 01/23/19  0308 01/22/19 0257 01/21/19 0256   SODIUM mmol/L 145 146* 145   POTASSIUM mmol/L 4.4 4.7 4.8   CHLORIDE mmol/L 105 105 107   CO2 mmol/L 33.0* 32.0* 33.0*   GLUCOSE mg/dL 99 132* 139*   BUN mg/dL 31* 41* 66*   CREATININE mg/dL 0.73 0.75 0.80     Coag:   Results from last 7 days   Lab Units 01/21/19 0256   INR  1.41*       Images:  Imaging Results (last 24 hours)     Procedure Component Value Units Date/Time    XR Chest 1 View [[id number]] Updated:  01/23/19 0432    CT Chest Without Contrast [[id number]] Collected:  01/22/19 0745     Updated:  01/22/19 0756    Narrative:       CT CHEST WO CONTRAST- 1/22/2019 6:02 AM CST     HISTORY: Pleural effusion; A41.9-Sepsis, unspecified organism;  R65.21-Severe sepsis with septic shock; Z74.09-Other reduced mobility     COMPARISON: Multiple prior chest x-rays, the most recent which is dated  1/22/2019     DOSE LENGTH PRODUCT: 538 mGy cm. Automated exposure control was also  utilized to decrease patient radiation dose.     TECHNIQUE: Serial helical tomographic images of the chest were acquired.  Multiplanar reformatted images were provided for review.     FINDINGS:  The imaged portion of the neck and thyroid gland is unremarkable.      A left-sided pleural drain is identified in the left lung base. There is  essentially complete collapse of the left lower lobe with partial left  upper lobe collapse. There is a multiloculated " effusion in the left  pleural space, the largest component of which is seen along the  posterior costal pleura in the left apex. Dependent peripheral  consolidations are seen in the right lower lobe and right upper lobe as  well and there is a small inflammatory nodularity throughout the right  upper, middle and lower lobes. No right-sided pleural effusion. There is  an endotracheal tube which appears in good position. Airways are  otherwise clear.     The heart is normal in size. Thoracic aorta is normal in caliber.  Central pulmonary arteries are mildly dilated, with the main pulmonary  artery measuring up to 34 mm in greatest axial diameter. No pericardial  effusion. There is moderate coronary artery atheromatous calcification.  No enlarged axillary or mediastinal lymph nodes are present.      No acute findings are seen in the bones or surrounding soft tissues.     No acute findings are seen in the visualized portion of the upper  abdomen. Cholelithiasis. Enteric tube is in good position.       Impression:       1. Complete left lower lobe collapse with partial left upper lobe  collapse, in part due to the adjacent multiloculated left pleural  effusion/empyema. A left chest tube is seen in the left lung base, with  the largest remaining component of the effusion/empyema now seen at the  left apex.  2. Dense consolidation in the dependent portions of the right upper and  right lower lobes with additional inflammatory nodularity throughout all  3 lobes on the right. The inflammatory nodularity in particular raises  suspicion for a developing infectious process in right lung.  3. Endotracheal tube and enteric tube appear in good position.  This report was finalized on 01/22/2019 07:53 by Dr Handy Avery, .    XR Chest 1 View [[id number]] Collected:  01/22/19 0740     Updated:  01/22/19 0753    Narrative:       XR CHEST 1 VW- 1/22/2019 3:25 AM CST     HISTORY: chest tube, vent; A41.9-Sepsis, unspecified  organism;  R65.21-Severe sepsis with septic shock; Z74.09-Other reduced mobility       COMPARISON: 1/21/2019.     FINDINGS:      Endotracheal tube is in good position. There is a left chest tube in the  left lung base, paralleling the left hemidiaphragm. There continues to  be opacification of the left lung base with obscuration of the left  hemidiaphragm and a fluid meniscus along the left chest wall. There is  dense opacification also of the left apex though it appears to be a  loculated effusion at the apex. There is increasing consolidation  throughout the right lung is well, affecting both the upper and lower  lung fields. There may be a trace effusion in the pleural space at the  right apex. An enteric tube is identified, the tip being difficult to  see on this exam.     The osseous structures and surrounding soft tissues demonstrate no acute  abnormality.       Impression:       1. Diffuse increased consolidation throughout the right lung from the  prior exams, suspicious for developing infectious infiltrate, edema or  perhaps atelectasis.  2. Appearance of the left lung is stable.  This report was finalized on 01/22/2019 07:44 by Dr Handy Avery, .        Images Today: Chest x-ray about the same.  CT scan shows multiple loculations still.  Chest tube in the basilar area in good position but loculated fluid throughout especially at the apex.  Right lower lobe has dense consolidation.  No real improvement from previous CT scan     Physical Exam:   Physical Exam:  General: Sedated on ventilator.   Chest: Some rales and wheezes throughout.  Markedly decreased breath sounds on the left and at the right base.  Heart: Regular rate and rhythm without murmur gallop or rub.  (Normal sinus rhythm on the monitor.)      Impression: CT scan yesterday showed little improvement.  He has no fluid on the right but dense consolidation right lower lobe.  Left side has loculated fluid is mentioned above.  Thoracostomy tube in  place but the fluid is loculated and thick and  Is not draining much.    Plan: Re-institute alteplase/Pulmozyme.  Not making much progress presently.  I will talk with Dr. Grey from hospitalist service later today.    Berlin Oglesby MD  01/23/19  5:29 AM          Electronically signed by Berlin Oglesby MD at 1/23/2019  5:38 AM     Tim Grey MD at 1/22/2019  8:02 AM              [facility]  INPATIENT PROGRESS NOTE    Patient Name: Alan Carlin  Date of Admission: 1/15/2019  Today's Date: 01/22/19  Length of Stay: 7  Primary Care Physician: Francy Dumont MD    Subjective   Chief Complaint: follow-up respiratory failure  HPI   No acute events reported from overnight.  Patient went down to radiology to get CT of the chest completed earlier this morning.  Patient is tolerating tube feeds with minimal residuals at this time.  Per nursing, when sedation is held patient continues to follow commands appropriately.  Family remains at bedside; daughter is present this morning.    Review of Systems     All pertinent negatives and positives are as above. All other systems have been reviewed and are negative unless otherwise stated.     Objective    Temp:  [98.1 °F (36.7 °C)-99.1 °F (37.3 °C)] 98.2 °F (36.8 °C)  Heart Rate:  [] 92  Resp:  [19-30] 21  BP: ()/(51-79) 102/60  FiO2 (%):  [80 %] 80 %  Physical Exam   Constitutional: No distress.   Sedated on propofol; on mechanical ventilation   HENT:   Head: Normocephalic.   Mouth/Throat: No oropharyngeal exudate (ET tube in place).   Eyes: Pupils are equal, round, and reactive to light. No scleral icterus.   Neck: No tracheal deviation present.   Cardiovascular: Normal rate and regular rhythm.   Pulmonary/Chest: Effort normal. He has rales.   On 80% Fi02 and 12 PEEP; BSs more diminished on left as compared to right; chest tube in place on the left   Abdominal: Soft. He exhibits no distension.  There is no tenderness. There is no rebound.   Genitourinary:   Genitourinary Comments: Hammonds in place   Musculoskeletal: He exhibits edema.   LUE PICC; peripheral IV in right hand   Neurological: He is alert.   On propofol for sedation   Skin: Skin is warm and dry. He is not diaphoretic.   Vitals reviewed.      Results Review:  I have reviewed the labs, radiology results, and diagnostic studies.    Laboratory Data:   Results from last 7 days   Lab Units 01/22/19  0257 01/21/19  0256 01/20/19  0353   WBC 10*3/mm3 16.47* 14.59* 16.02*   HEMOGLOBIN g/dL 11.6* 11.5* 11.3*   HEMATOCRIT % 38.0* 36.5* 35.5*   PLATELETS 10*3/mm3 312 294 268        Results from last 7 days   Lab Units 01/22/19  0257 01/21/19  0256 01/20/19  0353 01/19/19  0230 01/18/19  0418  01/17/19  0322   SODIUM mmol/L 146* 145 142 144 140   < > 139   POTASSIUM mmol/L 4.7 4.8 4.9 4.5 5.4*   < > 5.6*   CHLORIDE mmol/L 105 107 107 104 102   < > 99   CO2 mmol/L 32.0* 33.0* 30.0 30.0 31.0   < > 30.0   BUN mg/dL 41* 66* 86* 76* 72*   < > 67*   CREATININE mg/dL 0.75 0.80 1.16 1.07 0.96   < > 0.93   CALCIUM mg/dL 8.9 8.9 8.6 8.8 8.6   < > 8.5   BILIRUBIN mg/dL  --   --   --  0.3 0.4  --  0.5   ALK PHOS U/L  --   --   --  74 77  --  105   ALT (SGPT) U/L  --   --   --  30 34  --  28   AST (SGOT) U/L  --   --   --  42 42  --  31   GLUCOSE mg/dL 132* 139* 159* 160* 348*   < > 306*    < > = values in this interval not displayed.       Culture Data:   Blood Culture   Date Value Ref Range Status   01/16/2019 No growth at 5 days  Final   01/16/2019 No growth at 5 days  Final     MRSA SCREEN CX   Date Value Ref Range Status   01/16/2019   Final    No Methicillin Resistant Staphylococcus aureus isolated     Respiratory Culture   Date Value Ref Range Status   01/15/2019 Rare Normal Respiratory Ana Lilia  Final   01/15/2019 Rare Candida albicans (A)  Final       Radiology Data:   Imaging Results (last 24 hours)     Procedure Component Value Units Date/Time    CT Chest Without  Contrast [[id number]] Collected:  01/22/19 0745     Updated:  01/22/19 0756    Narrative:       CT CHEST WO CONTRAST- 1/22/2019 6:02 AM CST     HISTORY: Pleural effusion; A41.9-Sepsis, unspecified organism;  R65.21-Severe sepsis with septic shock; Z74.09-Other reduced mobility     COMPARISON: Multiple prior chest x-rays, the most recent which is dated  1/22/2019     DOSE LENGTH PRODUCT: 538 mGy cm. Automated exposure control was also  utilized to decrease patient radiation dose.     TECHNIQUE: Serial helical tomographic images of the chest were acquired.  Multiplanar reformatted images were provided for review.     FINDINGS:  The imaged portion of the neck and thyroid gland is unremarkable.      A left-sided pleural drain is identified in the left lung base. There is  essentially complete collapse of the left lower lobe with partial left  upper lobe collapse. There is a multiloculated effusion in the left  pleural space, the largest component of which is seen along the  posterior costal pleura in the left apex. Dependent peripheral  consolidations are seen in the right lower lobe and right upper lobe as  well and there is a small inflammatory nodularity throughout the right  upper, middle and lower lobes. No right-sided pleural effusion. There is  an endotracheal tube which appears in good position. Airways are  otherwise clear.     The heart is normal in size. Thoracic aorta is normal in caliber.  Central pulmonary arteries are mildly dilated, with the main pulmonary  artery measuring up to 34 mm in greatest axial diameter. No pericardial  effusion. There is moderate coronary artery atheromatous calcification.  No enlarged axillary or mediastinal lymph nodes are present.      No acute findings are seen in the bones or surrounding soft tissues.     No acute findings are seen in the visualized portion of the upper  abdomen. Cholelithiasis. Enteric tube is in good position.       Impression:       1. Complete left  lower lobe collapse with partial left upper lobe  collapse, in part due to the adjacent multiloculated left pleural  effusion/empyema. A left chest tube is seen in the left lung base, with  the largest remaining component of the effusion/empyema now seen at the  left apex.  2. Dense consolidation in the dependent portions of the right upper and  right lower lobes with additional inflammatory nodularity throughout all  3 lobes on the right. The inflammatory nodularity in particular raises  suspicion for a developing infectious process in right lung.  3. Endotracheal tube and enteric tube appear in good position.  This report was finalized on 01/22/2019 07:53 by Dr Handy Avery, .    XR Chest 1 View [[id number]] Collected:  01/22/19 0740     Updated:  01/22/19 0753    Narrative:       XR CHEST 1 VW- 1/22/2019 3:25 AM CST     HISTORY: chest tube, vent; A41.9-Sepsis, unspecified organism;  R65.21-Severe sepsis with septic shock; Z74.09-Other reduced mobility       COMPARISON: 1/21/2019.     FINDINGS:      Endotracheal tube is in good position. There is a left chest tube in the  left lung base, paralleling the left hemidiaphragm. There continues to  be opacification of the left lung base with obscuration of the left  hemidiaphragm and a fluid meniscus along the left chest wall. There is  dense opacification also of the left apex though it appears to be a  loculated effusion at the apex. There is increasing consolidation  throughout the right lung is well, affecting both the upper and lower  lung fields. There may be a trace effusion in the pleural space at the  right apex. An enteric tube is identified, the tip being difficult to  see on this exam.     The osseous structures and surrounding soft tissues demonstrate no acute  abnormality.       Impression:       1. Diffuse increased consolidation throughout the right lung from the  prior exams, suspicious for developing infectious infiltrate, edema or  perhaps  atelectasis.  2. Appearance of the left lung is stable.  This report was finalized on 01/22/2019 07:44 by Dr Handy Avery, .          I have reviewed the patient's current medications.     Assessment/Plan     Active Hospital Problems    Diagnosis   • PNA (pneumonia)   • Acute respiratory failure with hypercapnia (CMS/HCC)   • Controlled type 2 diabetes mellitus without complication, with long-term current use of insulin (CMS/HCC)     1) Acute hypoxic and acute on chronic hypercapnia respiratory failure due to left-sided pleural effusion (suspected empyema) and right lower lobe community-acquired pneumonia   2) Septic shock due to community-acquired pneumonia and suspected empyema  3) Acute kidney injury, resolved  4) Morbid obesity   5) Normocytic anemia  6) Suppressed TSH, normal Free T4  7) Type 2 DM complicated by hyperglycemia  8) Hyperkalemia, improved  9) Constipation, resolved   10) Hypernatremia    Plan:  1.  CT Chest reviewed; also reviewed with family this AM at bedside  2.  Was on Levaquin and Cefepime (received 4 days of tx); now on IV Zosyn - day 4  3.  Appreciate CT Surg input regarding repeat CT Chest findings  4.  Appreciate Pulmonary input  5.  Follow-up cultures; currently negative  6.  Lovenox/Pepcid PPx  7.  Nebs scheduled  8.  Last received Lasix on 1/19  9.  TFs currently at 40ml/hr; minimal residuals per nursing  10.  Insulin gtt per protocol for tight control of BSs  11.  ICU care  12.  Updated daughter at bedside this AM      Tim Grey MD   01/22/19   8:02 AM    Electronically signed by Tim Grey MD at 1/22/2019  8:22 AM     Damion Wright MD at 1/22/2019  7:50 AM              PULMONARY AND CRITICAL CARE PROGRESS NOTE - [facility]    Patient: Alan Carlin    1960    MR# [id number]    Acct# [id number]  01/22/19   8:26 AM  Referring Provider: Tim Grey MD    Chief Complaint: Mechanically ventilated    Interval history: He remains  intubated and sedated today.  He continues to require a lot of ventilator support with PEEP at 12.5 and FiO2 80%, with sats at 92%.  His daughter is at bedside this morning and has been updated.  The patient has had a follow-up CT of the chest this morning that is awaiting review from Dr. Oglesby.  Nutrition is being addressed.    Meds:    enoxaparin 40 mg Subcutaneous Q24H   famotidine 20 mg Intravenous BID   guaiFENesin 400 mg Oral Q8H   ipratropium-albuterol 3 mL Nebulization Q4H - RT   piperacillin-tazobactam 4.5 g Intravenous Q8H   polyethylene glycol 17 g Oral Once   sodium chloride 3 mL Intravenous Q12H   sodium polystyrene 15 g Oral Once       insulin regular infusion 1 unit/mL 1-20 Units/hr Last Rate: 1 Units/hr (01/22/19 0111)   norepinephrine 0.02-0.3 mcg/kg/min Last Rate: Stopped (01/17/19 1730)   propofol 5-50 mcg/kg/min Last Rate: 35 mcg/kg/min (01/22/19 0639)     Review of Systems:     Cannot obtain due to mechanical ventilation.  The patient notably is critically ill and connected to a ventilator.  As such patient cannot communicate and provide any history whatsoever, including any history of present illness or interval history since arrival or review of systems. The interested reviewer may note this fact, as an attempt has been made at collecting and documenting these portions of the patient history, but this information is unobtainable despite attempted review and therefore cannot be documented at this time.     Ventilator Settings:     Vt (Set, L): 0.6 L  Resp Rate (Set): 18  Pressure Support (cm H2O): 0 cm H20  FiO2 (%): 80 %  PEEP/CPAP (cm H2O): 12.5 cm H20  Minute Ventilation (L/min) (Obs): 13.2 L/min  Resp Rate (Observed) Vent: 29  I:E Ratio (Set): 1:1.40  I:E Ratio (Obs): 1:1.8  PIP Observed (cm H2O): 31 cm H2O     Physical Exam:  Temp:  [98.1 °F (36.7 °C)-99.2 °F (37.3 °C)] 99.2 °F (37.3 °C)  Heart Rate:  [] 102  Resp:  [19-30] 21  BP: ()/(51-79) 122/69  FiO2 (%):  [80 %] 80  %    Intake/Output Summary (Last 24 hours) at 1/22/2019 0826  Last data filed at 1/22/2019 0420  Gross per 24 hour   Intake 1502 ml   Output 2310 ml   Net -808 ml     SpO2 Percentage    01/22/19 0706 01/22/19 0720 01/22/19 0800   SpO2: 93% 95% 92%      Physical Exam:    Constitutional: He appears well-developed and well-nourished. No distress. Sedated and intubated.  Obese.  HENT: ET tube in place, OG with nutrition infusing.  Head: Normocephalic and atraumatic.   Eyes: Pupils are equal, round, and reactive to light. Right eye exhibits no discharge. Left eye exhibits no discharge.   Neck: Normal range of motion. Neck supple.   thick   Cardiovascular: Normal rate and regular rhythm.   No murmur heard.  Pulmonary/Chest: He has decreased breath sounds in the left upper field and the left lower field.   bilateral rales.     left-sided chest tube in place  Abdominal: Soft. Bowel sounds are normal.  Abdomen is obese.    Musculoskeletal: He exhibits edema. He exhibits no deformity.   Neurological:   Intubated and sedated    Skin: Skin is warm and dry. No rash noted. He is not diaphoretic. No erythem a.   Psychiatric: He is sedated.  Nursing note and vitals reviewed.        Results from last 7 days   Lab Units 01/22/19  0257 01/21/19  0256 01/20/19  0353   WBC 10*3/mm3 16.47* 14.59* 16.02*   HEMOGLOBIN g/dL 11.6* 11.5* 11.3*   PLATELETS 10*3/mm3 312 294 268     Results from last 7 days   Lab Units 01/22/19  0257 01/21/19  0256 01/20/19  0353   SODIUM mmol/L 146* 145 142   POTASSIUM mmol/L 4.7 4.8 4.9   BUN mg/dL 41* 66* 86*   CREATININE mg/dL 0.75 0.80 1.16     Results from last 7 days   Lab Units 01/22/19  0225 01/21/19 2109 01/21/19  0015   PH, ARTERIAL pH units 7.466* 7.446 7.385   PCO2, ARTERIAL mm Hg 47.0* 49.2* 55.2*   PO2 ART mm Hg 66.8* 85.8 92.6   FIO2 % 80 80 80     Blood Culture   Date Value Ref Range Status   01/16/2019 No growth at 24 hours  Preliminary   01/16/2019 No growth at 24 hours  Preliminary      Respiratory Culture   Date Value Ref Range Status   01/15/2019 Rare Normal Respiratory Ana Lilia  Final   01/15/2019 Rare Candida albicans (A)  Final     Recent films:  Ct Chest Without Contrast    Result Date: 1/22/2019  1. Complete left lower lobe collapse with partial left upper lobe collapse, in part due to the adjacent multiloculated left pleural effusion/empyema. A left chest tube is seen in the left lung base, with the largest remaining component of the effusion/empyema now seen at the left apex. 2. Dense consolidation in the dependent portions of the right upper and right lower lobes with additional inflammatory nodularity throughout all 3 lobes on the right. The inflammatory nodularity in particular raises suspicion for a developing infectious process in right lung. 3. Endotracheal tube and enteric tube appear in good position. This report was finalized on 01/22/2019 07:53 by Dr Handy Avery, .    Xr Chest 1 View    Result Date: 1/22/2019  1. Diffuse increased consolidation throughout the right lung from the prior exams, suspicious for developing infectious infiltrate, edema or perhaps atelectasis. 2. Appearance of the left lung is stable. This report was finalized on 01/22/2019 07:44 by Dr Handy Avery, .    Xr Chest 1 View    Result Date: 1/21/2019  Impression: 1.   No significant interval change since previous exam.   This report was finalized on 01/21/2019 07:25 by Dr. Shanice Tabor MD.    Xr Chest 1 View    Result Date: 1/20/2019  1. Similar left greater than right opacities. Large left pleural effusion. Left chest tube. This report was finalized on 01/20/2019 08:38 by Dr Kelli Weiner MD.    Films reviewed personally by me.  My interpretation: Endotracheal tube in position, dense consolidation in left lung with left sided chest tube in place, worsening consolidation throughout the right lung.    Pulmonary Assessment:    17. Acute hypoxemic respiratory fair requiring mechanical  ventilation  18. Empyema, left  19. Type II diabetes  20. Thyroid disease  21. Acute kidney injury  22. Hypotension requiring pressors  23. Sepsis  24. Leukocytosis    Recommend:     · No vent changes today, he continues to require high levels of support with PEEP at 12.5 and FiO2 at 80%.  · Chest CT done earlier today with no improvement on review.  · Continue nebs scheduled  · Chest x-ray and ABG daily while on the ventilator  · Stress ulcer and DVT prophylaxis  · Currently on Zosyn day #4  · Altepase and Pulmozyme per CT surgery   · Daughter updated at the bedside.  · He remains critically ill.    Electronically signed by LUIS E Huerta on 1/22/2019 at 8:26 AM       He remains on the ventilator.  His CT scan was done this morning which does show extensive loculated fluid on the left side.  He remains on the ventilator sedated mechanically ventilated.  Family is in the room.  We discussed his status.  He is not ready to extubate.  Debating whether he could or should undergo VATS procedure.  Risk would be extremely high given the high PEEP that he is needing as well as the high FiO2.  Unable to reduce any of these levels of support for right now.  Remains very critically ill.    I have seen and examined patient personally, performing a face-to-face diagnostic evaluation with plan of care reviewed and developed with APRN and nursing staff. I have addended and/or modified the above history of present illness, physical examination, and assessment and plan to reflect my findings and impressions. Essential elements of the care plan were discussed with APRN above.  Agree with findings and assessment/plan as documented above.    Electronically signed by Damion Wright MD, on 1/22/2019, 12:46 PM    EMR Dragon/Transcription disclaimer: Much of this encounter note is an electronic transcription/translation of spoken language to printed text. The electronic translation of spoken language may permit  "erroneous, or at times, nonsensical words or phrases to be inadvertently transcribed; although I have reviewed the note for such errors, some may still exist.        Electronically signed by Damion Wright MD at 2019 12:48 PM     Berlin Oglesby MD at 2019  5:06 AM          Patient: Alan Carlin  : 1960     Procedure:     Procedure Date:     POD: * No surgery found *      Subjective   Really no change.Patient still sedated requiring ventilator.  White count is slightly elevated over yesterday    Objective   Visit Vitals  /65   Pulse 98   Temp 98.1 °F (36.7 °C) (Axillary)   Resp 25   Ht 175.3 cm (69\")   Wt (!) 155 kg (341 lb 0.8 oz)   SpO2 91%   BMI 50.36 kg/m²       Intake/Output Summary (Last 24 hours) at 2019 0507  Last data filed at 2019 1600  Gross per 24 hour   Intake 978 ml   Output 1300 ml   Net -322 ml                                        Lab Results:    CBC:   Results from last 7 days   Lab Units 19  0257 19  0256 19  0353   WBC 10*3/mm3 16.47* 14.59* 16.02*   HEMATOCRIT % 38.0* 36.5* 35.5*   PLATELETS 10*3/mm3 312 294 268     BMP:   Results from last 7 days   Lab Units 19  0257 19  0256 19  0353   SODIUM mmol/L 146* 145 142   POTASSIUM mmol/L 4.7 4.8 4.9   CHLORIDE mmol/L 105 107 107   CO2 mmol/L 32.0* 33.0* 30.0   GLUCOSE mg/dL 132* 139* 159*   BUN mg/dL 41* 66* 86*   CREATININE mg/dL 0.75 0.80 1.16     Coag:   Results from last 7 days   Lab Units 19  0256 01/15/19  2133   INR  1.41* 1.68*   APTT seconds  --  39.2*       Images:  Imaging Results (last 24 hours)     Procedure Component Value Units Date/Time    XR Chest 1 View [[id number]] Updated:  19    XR Chest 1 View [[id number]] Collected:  19     Updated:  19 0728    Narrative:       Frontal supine radiograph of the chest 2019 4:45 AM CST     History: chest tube; A41.9-Sepsis, unspecified organism; R65.21-Severe  sepsis with septic " shock; Z74.09-Other reduced mobility     Comparison: Chest x-ray dated 1/20/2019.      Findings:   Lines and tubes are stable in position. No new opacities or  pneumothoraces are visualized in the chest. The cardiomediastinal  silhouette and pulmonary vascularity are unchanged.       No acute osseous or soft tissue abnormality is noted.        Impression:       Impression:   1.   No significant interval change since previous exam.        This report was finalized on 01/21/2019 07:25 by Dr. Shanice Tabor MD.        Images Today: Fairly the same.  Still large amount of opacity in left lung.  Thoracostomy tube in place     Physical Exam:   Gen.: Sedated on ventilator  Chest: Decreased breath sounds bilaterally especially on the left.  Rales and wheezes throughout  Heart: Regular rate and rhythm    Impression: Continues respiratory failure with left empyema.    Plan: CT scan today to assess progress of Pulmozyme.    Berlin Oglesby MD  01/22/19  5:07 AM          Electronically signed by Berlin Oglesby MD at 1/22/2019  5:09 AM     Concepcion Harris MD at 1/21/2019  3:45 PM                [facility]         Dr Concepcion Harris   LOS: 6 days   Patient Care Team:  Francy Dumont MD as PCP - General (Family Medicine)  Erin Nolasco MA as Medical Assistant    Chief Complaint: Acute respiratory failure status post left empyema status post chest tube placement    Interval History: Notes significant change.  Remains on 80% FiO2 with PEEP of 12.5.  Patient sedated intubated.  Not following commands for me    REVIEW OF SYSTEMS:   Sedated intubated.    Ventilator/Non-Invasive Ventilation Settings (From admission, onward)    Start     Ordered    01/16/19 0824  Ventilator - AC/VC; (18); 60; 90%; Other (see comments); 7; 600  Continuous     Question Answer Comment   Vent Mode AC/VC    Breath rate  18   FiO2 60    FiO2 titrate for Sp02% =/> 90%    PEEP Other (see comments)    PEEP: 7    Tidal Volume 600         "01/16/19 0824    01/16/19 0033  Ventilator - AC/VC; (22); 100; 90%; 5; (700)  Continuous,   Status:  Canceled     Question Answer Comment   Vent Mode AC/VC    Breath rate  22   FiO2 100    FiO2 titrate for Sp02% =/> 90%    PEEP 5    Tidal Volume  700       01/16/19 0033    01/16/19 0008  Ventilator - AC/VC; 100; 90%  Continuous,   Status:  Canceled     Question Answer Comment   Vent Mode AC/VC    FiO2 100    FiO2 titrate for Sp02% =/> 90%        01/16/19 0016            Vital Signs  Temp:  [98.1 °F (36.7 °C)-98.8 °F (37.1 °C)] 98.1 °F (36.7 °C)  Heart Rate:  [] 95  Resp:  [20-26] 23  BP: ()/(48-78) 100/65  FiO2 (%):  [80 %] 80 %    Intake/Output Summary (Last 24 hours) at 1/21/2019 1545  Last data filed at 1/21/2019 1534  Gross per 24 hour   Intake 2542.86 ml   Output 2905 ml   Net -362.14 ml     Flowsheet Rows      First Filed Value   Admission Height  175.3 cm (69\") Documented at 01/15/2019 1930   Admission Weight  154 kg (340 lb 4.8 oz)  (Abnormal)  Documented at 01/15/2019 1930          Physical Exam:   General Appearance:    Sedated intubated.  Not following commands for me.  ET tube good position orally.     Lungs:    Diminished breath sounds on the left.      Heart:    Regular rhythm and normal rate, normal S1 and S2, no            murmur, no gallop, no rub, no click   Chest Wall:    No abnormalities observed   Abdomen:     Normal bowel sounds, no masses, no organomegaly, soft        non-tender, non-distended, no guarding, no rebound                tenderness   Extremities:   Moves all extremities well, 1+ edema, no cyanosis, no             redness  CNS sedated intubated      Results Review:        Results from last 7 days   Lab Units 01/21/19  0256 01/20/19  0353 01/19/19  0230   SODIUM mmol/L 145 142 144   POTASSIUM mmol/L 4.8 4.9 4.5   CHLORIDE mmol/L 107 107 104   CO2 mmol/L 33.0* 30.0 30.0   BUN mg/dL 66* 86* 76*   CREATININE mg/dL 0.80 1.16 1.07   GLUCOSE mg/dL 139* 159* 160*   CALCIUM mg/dL " 8.9 8.6 8.8     Results from last 7 days   Lab Units 01/15/19  2133   TROPONIN I ng/mL 0.015     Results from last 7 days   Lab Units 01/21/19  0256 01/20/19  0353 01/19/19  0230   WBC 10*3/mm3 14.59* 16.02* 17.88*   HEMOGLOBIN g/dL 11.5* 11.3* 12.1*   HEMATOCRIT % 36.5* 35.5* 37.9*   PLATELETS 10*3/mm3 294 268 274     Results from last 7 days   Lab Units 01/21/19  0256 01/15/19  2133   INR  1.41* 1.68*   APTT seconds  --  39.2*         Results from last 7 days   Lab Units 01/21/19  0256   MAGNESIUM mg/dL 2.8*         Results from last 7 days   Lab Units 01/21/19  0015   PH, ARTERIAL pH units 7.385   PO2 ART mm Hg 92.6   PCO2, ARTERIAL mm Hg 55.2*   HCO3 ART mmol/L 33.0*       I reviewed the patient's new clinical results.  I personally viewed and interpreted the patient's CXR        Medication Review:     custom medication builder 50 mL Intrapleural BID   enoxaparin 40 mg Subcutaneous Q24H   famotidine 20 mg Intravenous BID   guaiFENesin 400 mg Oral Q8H   ipratropium-albuterol 3 mL Nebulization Q4H - RT   piperacillin-tazobactam 4.5 g Intravenous Q8H   polyethylene glycol 17 g Oral Once   sodium chloride 3 mL Intravenous Q12H   sodium polystyrene 15 g Oral Once         insulin regular infusion 1 unit/mL 1-20 Units/hr Last Rate: 2 Units/hr (01/21/19 0659)   norepinephrine 0.02-0.3 mcg/kg/min Last Rate: Stopped (01/17/19 1730)   propofol 5-50 mcg/kg/min Last Rate: 30 mcg/kg/min (01/21/19 1434)       ASSESSMENT:   1. Acute hypoxemic respiratory fair requiring mechanical ventilation  2. Empyema, left status post chest tube placement with multiple unclogging of the chest tube  3. Type II diabetes  4. Thyroid disease  5. Acute kidney injury  6. Hypotension requiring pressors  7. Sepsis           PLAN:  Continue current vent support with 80% FiO2 and PEEP 12.5.  Wean FiO2 as tolerated.  Once FiO2 to 60% May start decreasing the PEEP.  ABGs stable with FiO2 74.  Patient is to be sedated with rales -1 to -2  Antibiotics to  be continued.  Adjust based on cultures  We will wean off oxygen as patient tolerates  Plans per thoracic surgery noted to repeat CT chest likely in the morning and decide further course of action.  Continue supportive care  Tube feeds  Discussed with family at bedside      Concepcion Harris MD  01/21/19  3:45 PM          Electronically signed by Concepcion Harris MD at 1/21/2019  3:46 PM       Consult Notes (last 72 hours) (Notes from 1/21/2019 12:12 PM through 1/24/2019 12:12 PM)     No notes of this type exist for this encounter.

## 2019-01-24 NOTE — PROGRESS NOTES
Continued Stay Note  [facility]     Patient Name: Alan Carlin  MRN: [id number]  Today's Date: 1/24/2019    Admit Date: 1/15/2019    Discharge Plan     Row Name 01/24/19 1546       Plan    Plan  Transfer to     Patient/Family in Agreement with Plan  yes    Plan Comments  Now [facility] cannot transfer patient to  due to oxygen requirements.  No other choice but for patient to transport via ground.  RACHEL spoke with aydin Simms captain.  Mendez states they should be able to transport patient unless they arrive at hospital and find patient is too unstable for transfer.  Rachel has faxed requested information to [facility].  A nurse would have to travel with patient.  When bed available and ready to transport contact [facility] at [phone].  Status check at  at 3:37 and still no bed available [phone]/ transfer center.    Row Name 01/24/19 1422       Plan    Plan  Transfer to     Patient/Family in Agreement with Plan  yes    Plan Comments  Patient has transfer pending at .   ( transfer center: [phone]).  Patient will transport via fixed wing through [facility].  To dispatch once bed is available contact [phone].  [facility] requires hospital to send drips patient is currently receiving and to keep chest tube.          Discharge Codes    No documentation.             DARLYN Ashraf

## 2019-01-24 NOTE — DISCHARGE SUMMARY
[facility] Medicine Services  DISCHARGE SUMMARY       Date of Admission: 1/15/2019  Date of Discharge:  1/24/2019  Primary Care Physician: Francy Dumont MD    Presenting Problem/History of Present Illness:  PNA (pneumonia) [J18.9]     Final Discharge Diagnoses:  Active Hospital Problems    Diagnosis   • PNA (pneumonia)   • Acute respiratory failure with hypercapnia (CMS/formerly [facility])   • Controlled type 2 diabetes mellitus without complication, with long-term current use of insulin (CMS/formerly [facility])     DISCHARGE DIAGNOSES:  1) Acute hypoxemic respiratory failure due to left-sided pleural effusion and suspected empyema and right lower lobe pneumonia; requiring mechanical ventilation  2) Septic shock due to community-acquired pneumonia and empyema  3) Acute kidney injury, improved  4) Morbid obesity   5) Normocytic anemia  6) Suppressed TSH, normal Free T4  7) Type 2 DM complicated by hyperglycemia  8) Hyperkalemia, improved  9) Constipation, resolved   10) Hypernatremia  11) Obstructive sleep apnea     Consults:   1.  Cardiothoracic Surgery  2.  Pulmonary Medicine    Procedures Performed:   Imaging Results (all)     Procedure Component Value Units Date/Time    XR Chest 1 View [[id number]] Collected:  01/24/19 0748     Updated:  01/24/19 0752    Narrative:       EXAMINATION: XR CHEST 1 VW- 1/24/2019 7:48 AM CST     HISTORY: Chest tube, intubated     COMPARISON: 1/23/2019     FINDINGS:  Endotracheal tube is in place with tip above the cesar. Enteric tube is  in place with tip below the diaphragm and out of the field-of-view. A  chest tube is in place with tip directed at the medial left lung base.  Diffuse airspace opacities are again seen bilaterally, similar in  appearance to the previous exam. No definite pneumothorax is identified  on this exam. There is loculated pleural fluid in the upper left lung  field.       Impression:       Stable one day appearance of the chest.  This report was  finalized on 01/24/2019 07:49 by Dr. Anoop Brunson MD.    XR Chest 1 View [[id number]] Collected:  01/23/19 0734     Updated:  01/23/19 0738    Narrative:       Frontal supine radiograph of the chest 1/23/2019 4:32 AM CST     History: intubated; A41.9-Sepsis, unspecified organism; R65.21-Severe  sepsis with septic shock; Z74.09-Other reduced mobility     Comparison: Chest x-ray dated 1/22/2019      Findings:   Lines and tubes are stable in position. Bilateral parenchymal opacities  have increased.. The cardiomediastinal silhouette and pulmonary  vascularity are unchanged.       No acute osseous or soft tissue abnormality is noted.        Impression:       Impression:   1.   Increasing bilateral parenchymal opacities, concerning for  worsening findings of infection..        This report was finalized on 01/23/2019 07:35 by Dr. Shanice Tabor MD.    CT Chest Without Contrast [[id number]] Collected:  01/22/19 0745     Updated:  01/22/19 0756    Narrative:       CT CHEST WO CONTRAST- 1/22/2019 6:02 AM CST     HISTORY: Pleural effusion; A41.9-Sepsis, unspecified organism;  R65.21-Severe sepsis with septic shock; Z74.09-Other reduced mobility     COMPARISON: Multiple prior chest x-rays, the most recent which is dated  1/22/2019     DOSE LENGTH PRODUCT: 538 mGy cm. Automated exposure control was also  utilized to decrease patient radiation dose.     TECHNIQUE: Serial helical tomographic images of the chest were acquired.  Multiplanar reformatted images were provided for review.     FINDINGS:  The imaged portion of the neck and thyroid gland is unremarkable.      A left-sided pleural drain is identified in the left lung base. There is  essentially complete collapse of the left lower lobe with partial left  upper lobe collapse. There is a multiloculated effusion in the left  pleural space, the largest component of which is seen along the  posterior costal pleura in the left apex. Dependent peripheral  consolidations are seen  in the right lower lobe and right upper lobe as  well and there is a small inflammatory nodularity throughout the right  upper, middle and lower lobes. No right-sided pleural effusion. There is  an endotracheal tube which appears in good position. Airways are  otherwise clear.     The heart is normal in size. Thoracic aorta is normal in caliber.  Central pulmonary arteries are mildly dilated, with the main pulmonary  artery measuring up to 34 mm in greatest axial diameter. No pericardial  effusion. There is moderate coronary artery atheromatous calcification.  No enlarged axillary or mediastinal lymph nodes are present.      No acute findings are seen in the bones or surrounding soft tissues.     No acute findings are seen in the visualized portion of the upper  abdomen. Cholelithiasis. Enteric tube is in good position.       Impression:       1. Complete left lower lobe collapse with partial left upper lobe  collapse, in part due to the adjacent multiloculated left pleural  effusion/empyema. A left chest tube is seen in the left lung base, with  the largest remaining component of the effusion/empyema now seen at the  left apex.  2. Dense consolidation in the dependent portions of the right upper and  right lower lobes with additional inflammatory nodularity throughout all  3 lobes on the right. The inflammatory nodularity in particular raises  suspicion for a developing infectious process in right lung.  3. Endotracheal tube and enteric tube appear in good position.  This report was finalized on 01/22/2019 07:53 by Dr Handy Avery, .    XR Chest 1 View [[id number]] Collected:  01/22/19 0740     Updated:  01/22/19 0753    Narrative:       XR CHEST 1 VW- 1/22/2019 3:25 AM CST     HISTORY: chest tube, vent; A41.9-Sepsis, unspecified organism;  R65.21-Severe sepsis with septic shock; Z74.09-Other reduced mobility       COMPARISON: 1/21/2019.     FINDINGS:      Endotracheal tube is in good position. There is a left  chest tube in the  left lung base, paralleling the left hemidiaphragm. There continues to  be opacification of the left lung base with obscuration of the left  hemidiaphragm and a fluid meniscus along the left chest wall. There is  dense opacification also of the left apex though it appears to be a  loculated effusion at the apex. There is increasing consolidation  throughout the right lung is well, affecting both the upper and lower  lung fields. There may be a trace effusion in the pleural space at the  right apex. An enteric tube is identified, the tip being difficult to  see on this exam.     The osseous structures and surrounding soft tissues demonstrate no acute  abnormality.       Impression:       1. Diffuse increased consolidation throughout the right lung from the  prior exams, suspicious for developing infectious infiltrate, edema or  perhaps atelectasis.  2. Appearance of the left lung is stable.  This report was finalized on 01/22/2019 07:44 by Dr Handy Avery, .    XR Chest 1 View [[id number]] Collected:  01/21/19 0724     Updated:  01/21/19 0728    Narrative:       Frontal supine radiograph of the chest 1/21/2019 4:45 AM CST     History: chest tube; A41.9-Sepsis, unspecified organism; R65.21-Severe  sepsis with septic shock; Z74.09-Other reduced mobility     Comparison: Chest x-ray dated 1/20/2019.      Findings:   Lines and tubes are stable in position. No new opacities or  pneumothoraces are visualized in the chest. The cardiomediastinal  silhouette and pulmonary vascularity are unchanged.       No acute osseous or soft tissue abnormality is noted.        Impression:       Impression:   1.   No significant interval change since previous exam.        This report was finalized on 01/21/2019 07:25 by Dr. Shanice Tabor MD.    XR Chest 1 View [[id number]] Collected:  01/20/19 0835     Updated:  01/20/19 0841    Narrative:       EXAM: XR CHEST 1 VW- - 1/20/2019 8:32 AM CST     HISTORY: ET tube, chest  tubes; A41.9-Sepsis, unspecified organism;  R65.21-Severe sepsis with septic shock; Z74.09-Other reduced mobility       COMPARISON: 1/19/2019.      TECHNIQUE:  1 images.  Frontal view of the chest.     FINDINGS:    Endotracheal tube tip projects over the mid trachea. Gastric tube tip  outside the field of view. Similar left chest tube at the inferior  chest.     Opacifications at the left upper and left lower lung as well as right  midlung. Large left pleural effusion. No pneumothorax. Cardiac and  mediastinal silhouette partially obscured by pulmonary opacities.  Cardiac silhouette appears prominent. No acute bony finding.          Impression:       1. Similar left greater than right opacities. Large left pleural  effusion. Left chest tube.  This report was finalized on 01/20/2019 08:38 by Dr Kelli Weiner MD.    XR Chest 1 View [[id number]] Collected:  01/19/19 0616     Updated:  01/19/19 0622    Narrative:       EXAM: XR CHEST 1 VW- - 1/19/2019 3:05 AM CST     HISTORY: Intubated Patient; A41.9-Sepsis, unspecified organism;  R65.21-Severe sepsis with septic shock; Z74.09-Other reduced mobility       COMPARISON: 1/18/2019, 1/17/2019.      TECHNIQUE:  1 images.  Frontal view of the chest.     FINDINGS:    Endotracheal tube tip projects over the mid trachea. Feeding tube tip  outside field of view. Left chest tube in similar position compared to  prior.     No right pleural effusion. Right mid lung opacity similar to prior. No  definite left pneumothorax. Similar left basilar consolidation and  effusion.     Similar enlarged cardiac silhouette. Calcified aortic atherosclerosis.          Impression:       1. Similar left basilar consolidation and effusion. Similar right  midlung opacity.  2. Similar prominent cardiac silhouette.  3. Lines and tubes as above.  This report was finalized on 01/19/2019 06:19 by Dr Kelli Weiner MD.    XR Abdomen KUB [[id number]] Collected:  01/18/19 1130     Updated:  01/18/19 1135     Narrative:       EXAMINATION:   XR ABDOMEN KUB-  1/18/2019 11:30 AM CST     HISTORY: Nasogastric tube     Single view the abdomen is obtained. Nasogastric tube is present distal  tip in the body the stomach.     IMPRESSION satisfactory placement nasogastric tube.  2. Left chest tube is also noted  This report was finalized on 01/18/2019 11:32 by Dr. Jerrod Nunez MD.    XR Chest 1 View [[id number]] Collected:  01/18/19 0817     Updated:  01/18/19 0723    Narrative:       History:  58-year-old with intubation.     Reference:  Chest radiograph one day prior.     Findings:  Frontal chest radiograph performed.     Moderate partially loculated left pleural effusion is unchanged from  yesterday. There is some aerated left upper lobe, although majority left  lung remains compressed. Mild consolidation right midlung is unchanged.  No pneumothorax.     Endotracheal tube is well-positioned and stable. Enteric tube descends  below diaphragm, tip not imaged. Large bore chest tube in the left base  is unchanged.          Impression:       No change. Left basilar chest tube is unchanged.  This report was finalized on [id number] by Dr Jean-Pierre Solano, .    XR Chest 1 View [[id number]] Collected:  01/17/19 0724     Updated:  01/17/19 0729    Narrative:       History:  58-year-old with intubation.     Reference:  Chest radiograph one day prior.     Findings:  Frontal chest radiograph performed.     Cardiomegaly. Large left pleural effusion with loculated component.  Majority the left lung is consolidated/compressed.: Minimal aeration of  the left upper lobe. There remains consolidation in the superior segment  right lower lobe. No right pleural effusion. No pneumothorax.     Well-positioned endotracheal tube. NG tube is not well seen distally,  technical related.          Impression:       No interval improvement.  This report was finalized on 01/17/2019 07:26 by Dr Jean-Pierre Solano, .    XR Abdomen KUB [[id number]] Collected:  01/16/19 1036      Updated:  01/16/19 1057    Narrative:       EXAMINATION:   XR ABDOMEN KUB-  1/16/2019 10:36 AM CST     HISTORY: NG tube placement     Single view the abdomen is obtained. Nasogastric tube is present with  the distal tip satisfactorily positioned in the body the stomach.       Impression:       Satisfactory placement nasogastric tube  This report was finalized on 01/16/2019 10:37 by Dr. Jerrod Nunez MD.    XR Chest 1 View [[id number]] Collected:  01/16/19 0937     Updated:  01/16/19 0943    Narrative:       EXAMINATION:   XR CHEST 1 VW-  1/16/2019 9:37 AM CST     HISTORY: Chest tube placement     Frontal upright radiograph of the chest 1/16/2019 8:04 AM CST     COMPARISON: January 16, 2019 3:00 AM.     FINDINGS:   The right lung appears clear. Large left pleural complex is present.  Left chest tube is present left lung base.. The cardiac silhouettes  enlarged. Endotracheal tube is present..      The osseous structures and surrounding soft tissues demonstrate no acute  abnormality.       Impression:       1. Left chest tube is present projecting over the left lung base.  Moderate to large left pleural complex is present.  2. Endotracheal tube nasogastric tube are satisfactorily position.        This report was finalized on 01/16/2019 09:40 by Dr. Jerrod Nunez MD.    CT Chest Without Contrast [[id number]] Collected:  01/16/19 0725     Updated:  01/16/19 0735    Narrative:       EXAMINATION:   CT CHEST WO CONTRAST-  1/16/2019 7:25 AM CST     CT CHEST WO CONTRAST- 1/15/2019 10:47 PM CST     HISTORY: Pneumonia complicated / unresolved     COMPARISON: None     DOSE LENGTH PRODUCT: 1112 mGy cm. Automated exposure control was also  utilized to decrease patient radiation dose.     TECHNIQUE: Serial helical tomographic images of the chest were acquired.  Multiplanar reformatted images were provided for review.     FINDINGS:  The imaged portion of the neck and thyroid gland is unremarkable.      Infiltrates present in the  right lower lobe. Opacification of the left  hemithorax. There is consolidation of the left lung and a moderate to  large left pleural complex. Only a small segment of the left upper lobe  is aerated.. The left pleural complex is large pleural complex. Is may  be causing compression atelectasis of the consolidated left lung.  Tracheal tube is present. Nasogastric tube is satisfactorily position..     The heart, great vessels, and pulmonary vessels are normal in appearance  within limits of a noncontrast study. There is no pericardial effusion.  No enlarged axillary or mediastinal lymph nodes are present.      No acute findings are seen in the bones or surrounding soft tissues.     Calculi are noted in the gallbladder.       Impression:       1. Large left pleural complex. There is consolidation of the left lung.  Only a small segment of the left upper lobe is aerated.  2. Small infiltrate right lower lobe        This report was finalized on 01/16/2019 07:32 by Dr. Jerrod Nunez MD.    XR Chest 1 View [[id number]] Collected:  01/16/19 0700     Updated:  01/16/19 0705    Narrative:       History:  58-year-old to confirm endotracheal tube placement.     Reference:  CT chest 1 day prior.     Findings:  Frontal chest radiograph performed.     There remains consolidation in the right lower lobe. Large left pleural  effusion compressing the majority the left lung. Alternatively this  could be extensive left lung pneumonia with parapneumonic fluid.  Atherosclerotic calcifications. No pneumothorax. Enteric tube extends  below diaphragm, tip not imaged. Endotracheal tube is in good position  at the level of the sternoclavicular joints.          Impression:       Satisfactory endotracheal tube.  This report was finalized on 01/16/2019 07:02 by Dr Jean-Pierre Solano, .    XR Chest 1 View [[id number]] Collected:  01/15/19 2003     Updated:  01/15/19 2008    Narrative:       EXAMINATION: Chest 1 view 1/15/2019     HISTORY: Respiratory  failure. Mechanical ventilation     FINDINGS: Endotracheal tube projects at the T3 level. There is volume  loss on the left with mediastinal shift to the left. There is  consolidation within the medial aspect of the left upper lobe as well as  consolidation of the left lower lobe with silhouetting of the left  hemidiaphragm. It is difficult to ascertain whether this represents  atelectasis related to mucoid impaction versus a neoplastic process. I  would suggest follow-up with an enhanced CT of the chest for further  assessment. There is also a nodular opacity within the right midlung  zone which could be infectious in nature but which could also be  assessed at the time of CT imaging. The right lung is otherwise clear.       Impression:       1.. Consolidation and volume loss within both the left upper and left  lower lobe with silhouetting of the left diaphragm, heart and left  mediastinum. It is difficult to ascertain whether this represents mucoid  impaction with partial collapse of segments of the left lung versus a  neoplastic process. CT imaging with IV contrast is recommended.  2. Nodular opacity within the right midlung zone either related to  ongoing infiltrate versus a neoplastic process.  3. Endotracheal tube well-positioned.  This report was finalized on 01/15/2019 20:05 by Dr. Rudy Ahmadi MD.          Pertinent Test Results:   Lab Results (last 48 hours)     Procedure Component Value Units Date/Time    POC Glucose Once [[id number]]  (Normal) Collected:  01/24/19 1443    Specimen:  Blood Updated:  01/24/19 1454     Glucose 112 mg/dL      Comment: : [id number] [employer]  AMeter ID: [id number]       POC Glucose Once [[id number]]  (Abnormal) Collected:  01/24/19 1156    Specimen:  Blood Updated:  01/24/19 1210     Glucose 149 mg/dL      Comment: : [id number] [employer]  AMeter ID: [id number]       POC Glucose Once [[id number]]  (Normal) Collected:  01/24/19 1012    Specimen:  Blood  Updated:  01/24/19 1024     Glucose 129 mg/dL      Comment: : [id number] Paras Monahan AMeter ID: [id number]       POC Glucose Once [[id number]]  (Abnormal) Collected:  01/24/19 0738    Specimen:  Blood Updated:  01/24/19 0752     Glucose 144 mg/dL      Comment: : [id number] Paras Monahan AMeter ID: [id number]       Basic Metabolic Panel [[id number]]  (Abnormal) Collected:  01/24/19 0632    Specimen:  Blood Updated:  01/24/19 0713     Glucose 169 mg/dL      BUN 56 mg/dL      Creatinine 1.10 mg/dL      Sodium 141 mmol/L      Potassium 4.8 mmol/L      Chloride 102 mmol/L      CO2 32.0 mmol/L      Calcium 8.8 mg/dL      eGFR Non African Amer 69 mL/min/1.73      BUN/Creatinine Ratio 50.9     Anion Gap 7.0 mmol/L     Narrative:       GFR Normal >60  Chronic Kidney Disease <60  Kidney Failure <15    Vancomycin, Trough [[id number]]  (Normal) Collected:  01/24/19 0632    Specimen:  Blood Updated:  01/24/19 0705     Vancomycin Trough 14.47 mcg/mL     POC Glucose Once [[id number]]  (Abnormal) Collected:  01/24/19 0624    Specimen:  Blood Updated:  01/24/19 0645     Glucose 148 mg/dL      Comment: : [id number] Sergey Crocker ID: [id number]       CBC (No Diff) [[id number]]  (Abnormal) Collected:  01/24/19 0632    Specimen:  Blood Updated:  01/24/19 0643     WBC 24.26 10*3/mm3      RBC 4.33 10*6/mm3      Hemoglobin 11.7 g/dL      Hematocrit 38.2 %      MCV 88.2 fL      MCH 27.0 pg      MCHC 30.6 g/dL      RDW 14.8 %      RDW-SD 48.2 fl      MPV 9.9 fL      Platelets 292 10*3/mm3     POC Glucose Once [[id number]]  (Abnormal) Collected:  01/24/19 0517    Specimen:  Blood Updated:  01/24/19 0535     Glucose 154 mg/dL      Comment: : [id number] [facility]one [facility] ID: [id number]       POC Glucose Once [[id number]]  (Abnormal) Collected:  01/24/19 0411    Specimen:  Blood Updated:  01/24/19 0433     Glucose 149 mg/dL      Comment: : [id number] Gilmer Boone CMeter ID: [id number]       Blood Gas, Arterial  [[id number]]  (Abnormal) Collected:  01/24/19 0348    Specimen:  Arterial Blood Updated:  01/24/19 0357     Site Right Brachial     Devon's Test N/A     pH, Arterial 7.375 pH units      pCO2, Arterial 55.0 mm Hg      Comment: 83 Value above reference range        pO2, Arterial 68.0 mm Hg      Comment: 84 Value below reference range        HCO3, Arterial 32.2 mmol/L      Comment: 83 Value above reference range        Base Excess, Arterial 5.6 mmol/L      Comment: 83 Value above reference range        O2 Saturation, Arterial 92.0 %      Comment: 84 Value below reference range        Temperature 37.0 C      Barometric Pressure for Blood Gas 750 mmHg      Modality Ventilator     FIO2 100 %      Ventilator Mode AC     Set Tidal Volume 600     Set Mech Resp Rate 20.0     PEEP 14.0     Collected by [id number]     Comment: Meter: [id number]     :  [id number]       POC Glucose Once [[id number]]  (Abnormal) Collected:  01/24/19 0301    Specimen:  Blood Updated:  01/24/19 0322     Glucose 190 mg/dL      Comment: : [id number] Gilmer Boone CMeter ID: [id number]       POC Glucose Once [[id number]]  (Abnormal) Collected:  01/24/19 0221    Specimen:  Blood Updated:  01/24/19 0243     Glucose 181 mg/dL      Comment: : [id number] Gilmer Boone CMeter ID: [id number]       POC Glucose Once [[id number]]  (Abnormal) Collected:  01/24/19 0022    Specimen:  Blood Updated:  01/24/19 0138     Glucose 187 mg/dL      Comment: : [id number] Gilmer Boone CMeter ID: [id number]       POC Glucose Once [[id number]]  (Abnormal) Collected:  01/23/19 2129    Specimen:  Blood Updated:  01/23/19 2151     Glucose 153 mg/dL      Comment: : [id number] Gilmer Boone CMeter ID: [id number]       POC Glucose Once [[id number]]  (Abnormal) Collected:  01/23/19 2000    Specimen:  Blood Updated:  01/23/19 2021     Glucose 166 mg/dL      Comment: : [id number] Gilmer Boone CMeter ID: [id number]       POC Glucose Once [[id number]]  (Abnormal)  Collected:  01/23/19 1806    Specimen:  Blood Updated:  01/23/19 1817     Glucose 157 mg/dL      Comment: : [id number] Paras Monahan  AMeter ID: [id number]       POC Glucose Once [[id number]]  (Abnormal) Collected:  01/23/19 1657    Specimen:  Blood Updated:  01/23/19 1708     Glucose 146 mg/dL      Comment: : [id number] Paras Monahan  AMeter ID: [id number]       POC Glucose Once [[id number]]  (Abnormal) Collected:  01/23/19 1557    Specimen:  Blood Updated:  01/23/19 1618     Glucose 185 mg/dL      Comment: : [id number] Paras Monahan  AMeter ID: [id number]       POC Glucose Once [[id number]]  (Abnormal) Collected:  01/23/19 1442    Specimen:  Blood Updated:  01/23/19 1453     Glucose 168 mg/dL      Comment: : [id number] Paras Monahan  AMeter ID: [id number]       POC Glucose Once [[id number]]  (Abnormal) Collected:  01/23/19 1347    Specimen:  Blood Updated:  01/23/19 1358     Glucose 171 mg/dL      Comment: : [id number] Paras Monahan  AMeter ID: [id number]       Blood Gas, Arterial [[id number]]  (Abnormal) Collected:  01/23/19 1245    Specimen:  Arterial Blood Updated:  01/23/19 1251     Site Left Radial     Devon's Test Positive     pH, Arterial 7.368 pH units      pCO2, Arterial 59.7 mm Hg      Comment: 83 Value above reference range        pO2, Arterial 85.6 mm Hg      HCO3, Arterial 34.3 mmol/L      Comment: 83 Value above reference range        Base Excess, Arterial 7.2 mmol/L      Comment: 83 Value above reference range        O2 Saturation, Arterial 95.2 %      Temperature 37.0 C      Barometric Pressure for Blood Gas 746 mmHg      Modality Ventilator     FIO2 100 %      Ventilator Mode AC     Set Tidal Volume 600     Set Mech Resp Rate 18.0     PEEP 14.0     Collected by [id number]     Comment: Meter: [id number]     :  [id number]       POC Glucose Once [[id number]]  (Abnormal) Collected:  01/23/19 1235    Specimen:  Blood Updated:  01/23/19 1245     Glucose 168 mg/dL      Comment:  : [id number] Paras Monahan  AMeter ID: [id number]       POC Glucose Once [[id number]]  (Abnormal) Collected:  01/23/19 1123    Specimen:  Blood Updated:  01/23/19 1135     Glucose 182 mg/dL      Comment: : [id number] Paras Monahan  AMeter ID: [id number]       Blood Gas, Arterial [[id number]]  (Abnormal) Collected:  01/23/19 1130    Specimen:  Arterial Blood Updated:  01/23/19 1134     Site Right Radial     Devon's Test Positive     pH, Arterial 7.403 pH units      pCO2, Arterial 55.2 mm Hg      Comment: 83 Value above reference range        pO2, Arterial 62.0 mm Hg      Comment: 84 Value below reference range        HCO3, Arterial 34.4 mmol/L      Comment: 83 Value above reference range        Base Excess, Arterial 8.0 mmol/L      Comment: 83 Value above reference range        O2 Saturation, Arterial 89.7 %      Comment: 84 Value below reference range        Temperature 37.0 C      Barometric Pressure for Blood Gas 746 mmHg      Modality Ventilator     FIO2 100 %      Ventilator Mode PC     Set Mech Resp Rate 18.0     PEEP 14.0     PIP 18 cmH2O      Comment: Meter: [id number]     :  [id number]        Collected by [id number]    Fungus Culture - Body Fluid, Pleural Cavity [[id number]] Collected:  01/16/19 0937    Specimen:  Body Fluid from Pleural Cavity Updated:  01/23/19 1001     Fungus Culture No fungus isolated at 1 week    POC Glucose Once [[id number]]  (Abnormal) Collected:  01/23/19 0813    Specimen:  Blood Updated:  01/23/19 0824     Glucose 135 mg/dL      Comment: : [id number] Paras Monahan  AMeter ID: [id number]       POC Glucose Once [[id number]]  (Normal) Collected:  01/23/19 0559    Specimen:  Blood Updated:  01/23/19 0611     Glucose 128 mg/dL      Comment: : [id number] Christie AmandaMeter ID: [id number]       Blood Gas, Arterial [[id number]]  (Abnormal) Collected:  01/23/19 0440    Specimen:  Arterial Blood Updated:  01/23/19 0456     Site Right Radial     Devon's Test Positive     pH,  Arterial 7.432 pH units      pCO2, Arterial 50.2 mm Hg      Comment: 83 Value above reference range        pO2, Arterial 75.7 mm Hg      Comment: 84 Value below reference range        HCO3, Arterial 33.4 mmol/L      Comment: 83 Value above reference range        Base Excess, Arterial 7.8 mmol/L      Comment: 83 Value above reference range        O2 Saturation, Arterial 94.4 %      Temperature 37.0 C      Barometric Pressure for Blood Gas 745 mmHg      Modality Ventilator     FIO2 100 %      Ventilator Mode PC     Set [facility] Resp Rate 18.0     PEEP 12.5     PIP 18 cmH2O      Comment: Meter: [id number]     :  [id number]        Collected by [id number]    POC Glucose Once [[id number]]  (Normal) Collected:  01/23/19 0352    Specimen:  Blood Updated:  01/23/19 0403     Glucose 102 mg/dL      Comment: : [id number] Chaparro Katelyn  Yoni ID: [id number]       Comprehensive Metabolic Panel [[id number]]  (Abnormal) Collected:  01/23/19 0308    Specimen:  Blood Updated:  01/23/19 0350     Glucose 99 mg/dL      BUN 31 mg/dL      Creatinine 0.73 mg/dL      Sodium 145 mmol/L      Potassium 4.4 mmol/L      Chloride 105 mmol/L      CO2 33.0 mmol/L      Calcium 8.9 mg/dL      Total Protein 6.2 g/dL      Albumin 2.60 g/dL      ALT (SGPT) 32 U/L      AST (SGOT) 26 U/L      Alkaline Phosphatase 76 U/L      Total Bilirubin 0.4 mg/dL      eGFR Non African Amer 110 mL/min/1.73      Globulin 3.6 gm/dL      A/G Ratio 0.7 g/dL      BUN/Creatinine Ratio 42.5     Anion Gap 7.0 mmol/L     CBC (No Diff) [[id number]]  (Abnormal) Collected:  01/23/19 0308    Specimen:  Blood Updated:  01/23/19 0326     WBC 17.30 10*3/mm3      RBC 4.48 10*6/mm3      Hemoglobin 11.9 g/dL      Hematocrit 38.8 %      MCV 86.6 fL      MCH 26.6 pg      MCHC 30.7 g/dL      RDW 14.6 %      RDW-SD 46.8 fl      MPV 9.6 fL      Platelets 334 10*3/mm3     POC Glucose Once [[id number]]  (Normal) Collected:  01/23/19 0230    Specimen:  Blood Updated:  01/23/19 0242      Glucose 106 mg/dL      Comment: : [id number] Chaparro Vallejo ID: [id number]       POC Glucose Once [[id number]]  (Normal) Collected:  01/23/19 0019    Specimen:  Blood Updated:  01/23/19 0029     Glucose 124 mg/dL      Comment: : [id number] Pratik AmandaMeter ID: [id number]       POC Glucose Once [[id number]]  (Abnormal) Collected:  01/22/19 2159    Specimen:  Blood Updated:  01/22/19 2210     Glucose 153 mg/dL      Comment: : [id number] Pratik AmandaMeter ID: [id number]       POC Glucose Once [[id number]]  (Abnormal) Collected:  01/22/19 2010    Specimen:  Blood Updated:  01/22/19 2022     Glucose 177 mg/dL      Comment: : [id number] Pratik AmandaMeter ID: [id number]       POC Glucose Once [[id number]]  (Abnormal) Collected:  01/22/19 1911    Specimen:  Blood Updated:  01/22/19 1933     Glucose 206 mg/dL      Comment: : [id number] Pratik AmandaMeter ID: [id number]       POC Glucose Once [[id number]]  (Abnormal) Collected:  01/22/19 1604    Specimen:  Blood Updated:  01/22/19 1615     Glucose 211 mg/dL      Comment: : [id number] Isaias Hanna ID: [id number]             Hospital Course:  The patient is a 58 y.o. male who presented to [facility] on 01/15/2019 as a transfer from [facility] due to respiratory failure.  Patient ultimately required intubation and mechanical ventilation.  Initial chest imaging revealed evidence of an abnormal left pleural complex with substantial consolidation involving the left lung, in addition to a small infiltrate involving the right lung.  Patient was admitted to the intensive care unit and had consultation with Dr. Oglesby of cardiothoracic surgery.  On 01/16/2019 patient had placement of a left thoracostomy tube.  After placement of the chest tube there was no evidence of an air leak and per records there was immediate drainage of a proximally 600 mL of serosanguineous fluid.  Patient was initially placed on broad antibiotic therapy  with vancomycin, levofloxacin, in addition to cefepime.  Patient has a known history of insulin dependent diabetes and was placed on an insulin drip while in the intensive care unit for tight blood sugar control.  Blood cultures were obtained which revealed no growth to date.  Cultures of the pleural fluid also have revealed no growth.  MRSA nasal screen was negative.  Urine legionella and strep pneumo antigens were also negative.    Patient had some mild acute kidney injury which responded to intravenous fluids during this hospitalization.  Pulmonary medicine was consulted and is also followed along with us during this hospitalization.  Unfortunately, we have had lack of improvement from a respiratory standpoint.  A repeat CT scan of the chest was performed on January 22 with the following findings:  1. Complete left lower lobe collapse with partial left upper lobe  collapse, in part due to the adjacent multiloculated left pleural  effusion/empyema. A left chest tube is seen in the left lung base, with  the largest remaining component of the effusion/empyema now seen at the  left apex.  2. Dense consolidation in the dependent portions of the right upper and  right lower lobes with additional inflammatory nodularity throughout all  3 lobes on the right. The inflammatory nodularity in particular raises  suspicion for a developing infectious process in right lung.  3. Endotracheal tube and enteric tube appear in good position.    We have had decreased output from the left-sided thoracostomy tube despite ongoing therapy with Pulmozyme and alteplase.  Patient is remained on aggressive ventilator settings to maintain adequate oxygen saturation, and in fact at this time is on 14 of PEEP and 100% FiO2.  He is currently on antibiotic therapy with vancomycin, Levaquin, and Merrem, and again no culture data has revealed any significant findings that would assist us in better tailoring antimicrobial therapy.    Patient has  "been receiving tube feeds which he has tolerating without problem and with no significant residuals.    Our hope was to improve the patient's respiratory status unknown to a point where he would tolerate going to the operating room for surgery regarding the multi loculated left pleural effusion and empyema.  At this time the risk is too great at our facility to proceed with surgery especially in the setting of his current ventilator settings are  14 of PEEP and 100% FiO2.  I have had multiple conversations with CT surgery in addition to pulmonary medicine.  I have also spoken with the family on multiple occasions.  They do seem to understand the gravity and significance of his current medical condition, including the risks that are inherent with transferring him to a tertiary Medical Center in his current medical condition.    Family elected on 19 that they did not want any other procedures and did not want any more alteplase/pulmozyme.  They elected to withdrawal care, patient passed very quickly with family at bedside.  Patient was treated with fentanyl gtt for air hunger and discomfort.  TIME OF DEATH was 1400.      Physical Exam on Discharge:  /75   Pulse 120   Temp 99.4 °F (37.4 °C)   Resp 24   Ht 175.3 cm (69\")   Wt (!) 156 kg (343 lb 0.6 oz)   SpO2 (!) 88%   BMI 50.66 kg/m²   Physical Exam  Please see my progress note from today    Condition on Discharge:  Decreased      Tim Grey MD  19  2:55 PM    Time: 45 min      "

## 2019-01-24 NOTE — DISCHARGE PLACEMENT REQUEST
"To: [facility]    From: Marilee Deal [phone]      Alan Wheeler (58 y.o. Male)     Date of Birth Social Security Number Address Home Phone MRN    1960  [address] [facility] [phone] [id number]    Hoahaoism Marital Status          Rastafarian        Admission Date Admission Type Admitting Provider Attending Provider Department, Room/Bed    1/15/19 Urgent Tim Grey MD Thompson, Benjamin H, MD [facility] INTENSIVE CARE, I012/1    Discharge Date Discharge Disposition Discharge Destination                       Attending Provider:  Tim Grey MD    Allergies:  No Known Allergies    Isolation:  None   Infection:  None   Code Status:  CPR    Ht:  175.3 cm (69\")   Wt:  156 kg (343 lb 0.6 oz)    Admission Cmt:  None   Principal Problem:  None                Active Insurance as of 1/15/2019     Primary Coverage     Payor Plan Insurance Group Employer/Plan Group    [employer] CROSS BLUE SHIELD PPO [id number]     Payor Plan Address Payor Plan Phone Number Payor Plan Fax Number Effective Dates    [address] [phone]  6/1/2010 - None Entered    [place] [address]       Subscriber Name Subscriber Birth Date Member ID       FOUZIA WHEELER [birth year]/1961 [id number]                 Emergency Contacts      (Rel.) Home Phone Work Phone Mobile Phone    Fouzia Wheeler (Spouse) [phone] --               History & Physical      Emmanuel Palm MD at 1/15/2019  8:17 PM              [facility] Medicine Services  HISTORY AND PHYSICAL    Date of Admission: 1/15/2019  Primary Care Physician: Francy Dumont MD    Subjective     Chief Complaint: Patient intubated pneumonia sepsis    History of Present Illness  Patient is a 58-year-old white male past medical history of obstructive sleep apnea uses CPAP as well as type II diabetes and hypertension.  He " presents to [facility] with from what his wife says a very long period of illness going on since November.  Apparently he has had upper respiratory type infections that he has not been able to get over.  He has been on antibiotics and steroids off and on for several weeks.  Finally patient states he got to a point where he could not even breathe.  He was sick but actually went to work as a  on Friday.  He subsequently got to the point where he could walk across the room.  He has had a cough chronically and is gotten worse in the last few days.  When he presented to the emergency room there he was found to have a large left lower lobe infiltrate consistent with pneumonia.  He was admitted started on Rocephin and Zithromax.  He subsequently was also bolused with IV fluids per sepsis protocol.  He had a worsening respiratory failure requiring intubation and his chest x-ray showed a large left-sided pneumonia infiltrate and probably effusion taking a probably two thirds of his long.  Repeat chest x-ray here looks even worse with probably 20% of his long visible in the upper lobe.  Please see report below.  He was hypotensive as well recurrent and is on dobutamine.  Patient has very poor IV access with a right antecubital 20-gauge and a 20-gauge in his foot.  He has a 20 year pack history of smoking but has not smoked in 20 years.  He does work as a  so he works obviously had a cold and moist environment all the time.  He also had an elevated PCO2 and is barely satting above 92% on 100% FiO2.  Interesting of note when he lower that of his bed down very much he desats his face turns bright red and he starts bucking and fighting the vent.        Review of Systems   Able to obtain due to patient is intubated on ventilator  Otherwise complete ROS reviewed and negative except as mentioned in the HPI.    Past Medical History:   Past Medical History:   Diagnosis Date   • Dyslipidemia    • Elevated  blood pressure    • Essential hypertension    • Type II diabetes mellitus, uncontrolled (CMS/[facility])      Past Surgical History:No past surgical history on file.  Social History:  reports that he has quit smoking. he has never used smokeless tobacco. He reports that he does not drink alcohol.    Family History: family history includes Diabetes in his other; Thyroid disease in his other.   Patient has diabetes and all of his siblings he also has a twin brother who  in his 40s from a cardiomyopathy    Allergies:  No Known Allergies  Medications:  Prior to Admission medications    Medication Sig Start Date End Date Taking? Authorizing Provider   atenolol (TENORMIN) 50 MG tablet Take 50 mg by mouth Daily.    ProviderWayne MD   Empagliflozin (JARDIANCE) 10 MG tablet Take 10 mg by mouth Daily. 10/29/18   Jmashid Brunson APRN   Insulin Degludec (TRESIBA FLEXTOUCH) 200 UNIT/ML solution pen-injector Inject 80 Units under the skin into the appropriate area as directed Daily. 10/29/18   Jamshid Brunson APRN   Insulin Lispro (HUMALOG KWIKPEN) 100 UNIT/ML solution pen-injector Inject 40 Units under the skin into the appropriate area as directed 3 (Three) Times a Day. 18   Jamshid Brunson APRN   Insulin Pen Needle (B-D ULTRAFINE III SHORT PEN) 31G X 8 MM misc Inject 4 times daily 10/29/18   Jamshid Brunson APRN   lisinopril-hydrochlorothiazide (PRINZIDE,ZESTORETIC) 20-25 MG per tablet Take 1 tablet by mouth Daily.    Provider, MD Wayne   metFORMIN (GLUCOPHAGE) 1000 MG tablet Take 1 tablet by mouth 2 (Two) Times a Day. 10/29/18   Jamshid Brunson APRN   ONE TOUCH ULTRA TEST test strip TEST FOUR TIMES DAILY 18   Jamshid Brunson APRN   rosuvastatin (CRESTOR) 5 MG tablet Take 1 tablet by mouth Every Night. 10/30/18 10/30/19  Jamshid Brunson APRN     Objective     Vital Signs: /77   Pulse 118   Temp 98 °F (36.7 °C) (Axillary)   Wt (!) 154 kg  (340 lb 4.8 oz)   SpO2 95%   BMI 48.83 kg/m²    Physical Exam  Gen.  Morbidly obese white male intubated sedated  HEENT: Atraumatic normocephalic pupils equal round reactive to light extraocular movements intact sclerae anicteric TMs negative mucous membranes moist neck is supple without lymphadenopathy ET tube in place as it JVD is noted no carotid bruits are auscultated oropharynx is without erythema or exudate  Chest: Almost no air movement or breath sounds on the left except for at the apex of the lung coarse breath sounds in right lung  CV: Tachycardic rate and regular rhythm normal S1-S2 no gallops murmurs or rubs  Abdomen: Protuberant nontender nondistended active bowel sounds no hepatosplenomegaly no masses no hernias  Extremities: No clubbing edema or cyanosis capillary refill is normal pulses are 2+ and symmetric at radial and dorsalis pedis posterior tibial pulses are intact and 2+ palpable  Neuro: Patient is intubated and sedated DTRs are 2+ and symmetric unable to perform her cooperate with rest of exam  Skin: Cool dry and intact no evidence of breakdown  Sensorium: Unable to assess  Nursing notes and vital signs reviewed        Results Reviewed:  Lab Results (last 24 hours)     Procedure Component Value Units Date/Time    Blood Gas, Arterial [[id number]]  (Abnormal) Collected:  01/15/19 2010    Specimen:  Arterial Blood Updated:  01/15/19 2013     Site Right Radial     Devon's Test Positive     pH, Arterial 7.327 pH units      Comment: 84 Value below reference range        pCO2, Arterial 57.3 mm Hg      Comment: 83 Value above reference range        pO2, Arterial 68.1 mm Hg      Comment: 84 Value below reference range        HCO3, Arterial 30.0 mmol/L      Comment: 83 Value above reference range        Base Excess, Arterial 2.7 mmol/L      Comment: 83 Value above reference range        O2 Saturation, Arterial 92.5 %      Comment: 84 Value below reference range        Temperature 37.0 C      Barometric  Pressure for Blood Gas 757 mmHg      Modality Ventilator     FIO2 100 %      Ventilator Mode AC     Set Tidal Volume 650     Set Mech Resp Rate 20.0     PEEP 5.0     Collected by [id number]     Comment: Meter: [id number]     :  [id number]       Respiratory Culture - Sputum, ET Suction [[id number]] Collected:  01/15/19 2009    Specimen:  Sputum from ET Suction Updated:  01/15/19 2013        Imaging Results (last 24 hours)     Procedure Component Value Units Date/Time    XR Chest 1 View [[id number]] Collected:  01/15/19 2003     Updated:  01/15/19 2008    Narrative:       EXAMINATION: Chest 1 view 1/15/2019     HISTORY: Respiratory failure. Mechanical ventilation     FINDINGS: Endotracheal tube projects at the T3 level. There is volume  loss on the left with mediastinal shift to the left. There is  consolidation within the medial aspect of the left upper lobe as well as  consolidation of the left lower lobe with silhouetting of the left  hemidiaphragm. It is difficult to ascertain whether this represents  atelectasis related to mucoid impaction versus a neoplastic process. I  would suggest follow-up with an enhanced CT of the chest for further  assessment. There is also a nodular opacity within the right midlung  zone which could be infectious in nature but which could also be  assessed at the time of CT imaging. The right lung is otherwise clear.       Impression:       1.. Consolidation and volume loss within both the left upper and left  lower lobe with silhouetting of the left diaphragm, heart and left  mediastinum. It is difficult to ascertain whether this represents mucoid  impaction with partial collapse of segments of the left lung versus a  neoplastic process. CT imaging with IV contrast is recommended.  2. Nodular opacity within the right midlung zone either related to  ongoing infiltrate versus a neoplastic process.  3. Endotracheal tube well-positioned.  This report was finalized on 01/15/2019 20:05  by Dr. Rudy Ahmadi MD.        I have personally reviewed and interpreted the radiology studies and ECG obtained at time of admission.     Assessment / Plan     Assessment:   1.  Community-acquired left upper and left lower lung field pneumonia possible mucus plugging  2.  Acute respiratory failure with hypoxia and hypercapnia requiring intubation and ventilation  3.  Type II diabetes with hyperglycemia  4.  Acute kidney injury with creatinine of 1.8.  GFR of 37 was 1.6 at [facility] hospital  5.  Sepsis  6.  Hypotension requiring pressors  7.  Poor venous access      Plan:    1.  Admit patient to intensive care unit continue gentle hydration and pressors will get CT scan of chest noncontrast change antibiotics to vancomycin and cefepime and Levaquin.  We will repeat cultures of blood and sputum will also get urine antigens for Legionella and strep pneumo.  Will also get pulmonary consultation patient may need bronchoscopy.  Also for ventilator management.  2.  Continue intubation and ventilation pulmonary consult as stated nebulizer treatments as needed mucolytic therapy.  We will place OG tube is well.  3.  Aggressive glucose management will start insulin drip once central line is placed per critical care protocol.  We sliding scale insulin in the meantime.  Check hemoglobin A1c serial Accu-Cheks.  4.  Check renal ultrasound monitor urine output patient has Hammonds catheter already in place will continue that.  Will gently hydrate and watch to try to avoid nephrotoxic agents. Consult nephrology if creatinine increases  5.  Patient has been bolused previously a do not believe he needs any more aggressive fluids and trying volume overloaded we will continue broad-spectrum antibiotics culture is obtained as above further plans as outlined above.  We will check lactic acid level.  6.  He was on dobutamine will change to Levophed.  7.  We will attempt to place central line if unable will place PICC order.   DVT  prophylaxis with lovenox.    Code Status: Full    Patient seen prior to midnight     I discussed the patient's findings and my recommendations with the patient's wife and daughter.  His wife is his healthcare power advocate    Estimated length of stay to be determined    Emmanuel Palm MD   01/15/19   8:17 PM              Electronically signed by Emmanuel Palm MD at 1/16/2019  2:44 AM          Physician Progress Notes (last 24 hours) (Notes from 1/23/2019  3:54 PM through 1/24/2019  3:54 PM)      Tim Grey MD at 1/24/2019  8:23 AM              [facility] Medicine Services  INPATIENT PROGRESS NOTE    Patient Name: Alan Carlin  Date of Admission: 1/15/2019  Today's Date: 01/24/19  Length of Stay: 9  Primary Care Physician: Francy Dumont MD    Subjective   Chief Complaint: follow-up respiratory failure  HPI   Patient has had to have PEEP increased to 14 and Fi02 at 100% to maintain sats near 90%.  Family at bedside.  No significant residuals with tube feeds.  Remains sedated on propofol.  I had a conference with Dr. Oglesby yesterday; case discussed with Dr. Wirght this morning.  Also discussed with spouse at bedside this morning.    Review of Systems     All pertinent negatives and positives are as above. All other systems have been reviewed and are negative unless otherwise stated.     Objective    Temp:  [97.5 °F (36.4 °C)-100.3 °F (37.9 °C)] 100.3 °F (37.9 °C)  Heart Rate:  [102-126] 111  Resp:  [20-26] 20  BP: ()/() 98/68  FiO2 (%):  [100 %] 100 %  Physical Exam   Constitutional: No distress.   Sedated on propofol; on mechanical ventilation   HENT:   Head: Normocephalic.   Mouth/Throat: No oropharyngeal exudate (ET tube in place).   Eyes: Pupils are equal, round, and reactive to light. No scleral icterus.   Neck: No tracheal deviation present.   Cardiovascular: Normal rate and regular rhythm.   Pulmonary/Chest: Effort normal. He has rales.   On  100% Fi02 and 14 PEEP; chest tube in place on the left   Abdominal: Soft. He exhibits no distension. There is no tenderness. There is no rebound.   Genitourinary:   Genitourinary Comments: Hammonds in place; small blistering lesions noted at urethral meatus insertion site of Hammonds catheter   Musculoskeletal: He exhibits edema.   LUE PICC; edema noted to RUE   Neurological: He is alert.   On propofol for sedation   Skin: Skin is warm and dry. He is not diaphoretic.   Vitals reviewed.      Results Review:  I have reviewed the labs, radiology results, and diagnostic studies.    Laboratory Data:   Results from last 7 days   Lab Units 01/24/19  0632 01/23/19  0308 01/22/19  0257   WBC 10*3/mm3 24.26* 17.30* 16.47*   HEMOGLOBIN g/dL 11.7* 11.9* 11.6*   HEMATOCRIT % 38.2* 38.8* 38.0*   PLATELETS 10*3/mm3 292 334 312        Results from last 7 days   Lab Units 01/24/19  0632 01/23/19  0308 01/22/19  0257  01/19/19  0230 01/18/19  0418   SODIUM mmol/L 141 145 146*   < > 144 140   POTASSIUM mmol/L 4.8 4.4 4.7   < > 4.5 5.4*   CHLORIDE mmol/L 102 105 105   < > 104 102   CO2 mmol/L 32.0* 33.0* 32.0*   < > 30.0 31.0   BUN mg/dL 56* 31* 41*   < > 76* 72*   CREATININE mg/dL 1.10 0.73 0.75   < > 1.07 0.96   CALCIUM mg/dL 8.8 8.9 8.9   < > 8.8 8.6   BILIRUBIN mg/dL  --  0.4  --   --  0.3 0.4   ALK PHOS U/L  --  76  --   --  74 77   ALT (SGPT) U/L  --  32  --   --  30 34   AST (SGOT) U/L  --  26  --   --  42 42   GLUCOSE mg/dL 169* 99 132*   < > 160* 348*    < > = values in this interval not displayed.       Culture Data:   Blood Culture   Date Value Ref Range Status   01/16/2019 No growth at 5 days  Final   01/16/2019 No growth at 5 days  Final     MRSA SCREEN CX   Date Value Ref Range Status   01/16/2019   Final    No Methicillin Resistant Staphylococcus aureus isolated     Respiratory Culture   Date Value Ref Range Status   01/15/2019 Rare Normal Respiratory Ana Lilia  Final   01/15/2019 Rare Candida albicans (A)  Final       Radiology  Data:   Imaging Results (last 24 hours)     Procedure Component Value Units Date/Time    XR Chest 1 View [[id number]] Collected:  01/24/19 0748     Updated:  01/24/19 0752    Narrative:       EXAMINATION: XR CHEST 1 VW- 1/24/2019 7:48 AM CST     HISTORY: Chest tube, intubated     COMPARISON: 1/23/2019     FINDINGS:  Endotracheal tube is in place with tip above the cesar. Enteric tube is  in place with tip below the diaphragm and out of the field-of-view. A  chest tube is in place with tip directed at the medial left lung base.  Diffuse airspace opacities are again seen bilaterally, similar in  appearance to the previous exam. No definite pneumothorax is identified  on this exam. There is loculated pleural fluid in the upper left lung  field.       Impression:       Stable one day appearance of the chest.  This report was finalized on 01/24/2019 07:49 by Dr. Anoop Brunson MD.          I have reviewed the patient's current medications.     Assessment/Plan     Active Hospital Problems    Diagnosis   • PNA (pneumonia)   • Acute respiratory failure with hypercapnia (CMS/HCC)   • Controlled type 2 diabetes mellitus without complication, with long-term current use of insulin (CMS/HCC)     1) Acute hypoxic and acute on chronic hypercapnia respiratory failure due to left-sided pleural effusion and suspected empyema and right lower lobe pneumonia   2) Septic shock due to community-acquired pneumonia and empyema  3) Acute kidney injury, resolved  4) Morbid obesity   5) Normocytic anemia  6) Suppressed TSH, normal Free T4  7) Type 2 DM complicated by hyperglycemia  8) Hyperkalemia, improved  9) Constipation, resolved   10) Hypernatremia    Plan:  1.  Now on IV Vanco, Levaquin, and Merrem  2.  Was on Levaquin (received 3 days of tx) and Cefepime (received 4 days of tx); then transitioned to IV Zosyn for approx 5 days; then transitioned to IV Merrem and added back Levaquin on 1/23  3.  Appreciate CT Surg and Pulmonary input;  case discussed with Dr. Oglesby and Dr. Wright within past 24hrs  4.  Certainly a difficult decision and very, very high risk regarding proceeding to surgery (VATS) in the setting of Fi02 at 100% and PEEP 14.  Pulmozyme and alteplase restarted.  5.  Follow-up cultures; currently negative  6.  Lovenox/Pepcid PPx  7.  Nebs scheduled; trial of Mucomyst nebs started by Pulmonary  8.  Last received Lasix on 1/19; repeat dose of IV Lasix given 1/23  9.  TFs currently at 35ml/hr; minimal residuals per nursing  10.  Insulin gtt per protocol for tight control of BSs  11.  ICU care  12.  Patient is critically ill  13.  Updated spouse at bedside this AM; will start contacting tertiary care medical centers this AM regarding arranging transfer.  Family understands risks of transferring in this high risk setting.      Tim Grey MD   01/24/19   8:23 AM    Electronically signed by Tim Grey MD at 1/24/2019  8:42 AM     Reji Robledo APRN at 1/24/2019  8:20 AM              PULMONARY AND CRITICAL CARE PROGRESS NOTE - [facility]    Patient: Alan Carlin    1960    MR# [id number]    Acct# [id number]  01/24/19   9:45 AM  Referring Provider: Tim Grey MD    Chief Complaint: Mechanically ventilated    Interval history: He remains intubated and sedated.  FiO2 remains at 100% and PEEP 14.  Chest x-ray is stable. Chest tube remains in place.   He had a temperature overnight.  His wife is at bedside this morning and has been updated.   Nutrition is being addressed.    Meds:    acetylcysteine 1.5 mL Nebulization BID - RT   custom medication builder 50 mL Intrapleural BID   enoxaparin 40 mg Subcutaneous Q24H   famotidine 20 mg Intravenous BID   guaiFENesin 400 mg Oral Q8H   ipratropium-albuterol 3 mL Nebulization Q4H - RT   levoFLOXacin 750 mg Intravenous Q24H   meropenem 2 g Intravenous Q8H   polyethylene glycol 17 g Oral Once   sodium chloride 3 mL Intravenous Q12H   sodium  polystyrene 15 g Oral Once   vancomycin 1,250 mg Intravenous Q12H       insulin regular infusion 1 unit/mL 1-20 Units/hr Last Rate: 13 Units/hr (01/24/19 0859)   norepinephrine 0.02-0.3 mcg/kg/min Last Rate: Stopped (01/17/19 1730)   propofol 5-50 mcg/kg/min Last Rate: 50 mcg/kg/min (01/24/19 0858)     Review of Systems:   Cannot obtain due to mechanical ventilation.  The patient notably is critically ill and connected to a ventilator.  As such patient cannot communicate and provide any history whatsoever, including any history of present illness or interval history since arrival or review of systems. The interested reviewer may note this fact, as an attempt has been made at collecting and documenting these portions of the patient history, but this information is unobtainable despite attempted review and therefore cannot be documented at this time.     Ventilator Settings:     Vt (Set, L): 0.6 L  Resp Rate (Set): 20  Pressure Support (cm H2O): 0 cm H20  FiO2 (%): 100 %  PEEP/CPAP (cm H2O): 14 cm H20  Minute Ventilation (L/min) (Obs): 13.4 L/min  Resp Rate (Observed) Vent: 22  I:E Ratio (Set): 1.10:1  I:E Ratio (Obs): 1:1.3  PIP Observed (cm H2O): 36 cm H2O     Physical Exam:  Temp:  [97.5 °F (36.4 °C)-100.3 °F (37.9 °C)] 100.3 °F (37.9 °C)  Heart Rate:  [102-126] 111  Resp:  [20-26] 20  BP: ()/() 98/68  FiO2 (%):  [100 %] 100 %    Intake/Output Summary (Last 24 hours) at 1/24/2019 0945  Last data filed at 1/24/2019 0755  Gross per 24 hour   Intake 4196.28 ml   Output 2270 ml   Net 1926.28 ml     SpO2 Percentage    01/24/19 0730 01/24/19 0738 01/24/19 0740   SpO2: (!) 89% 90% (!) 89%      Physical Exam:    Constitutional: He appears well-developed and well-nourished. No distress. Sedated and intubated.  Obese.  HENT: ET tube in place, OG with nutrition infusing.  Head: Normocephalic and atraumatic.   Eyes: Pupils are equal, round, and reactive to light. Right eye exhibits no discharge. Left eye exhibits  no discharge.   Neck: Normal range of motion. Neck supple.   thick   Cardiovascular:  Tachycardia.   No murmur heard.  Pulmonary/Chest: He has decreased breath sounds in the left upper field and the left lower field.  bilateral rales.     left-sided chest tube in place with minimal drainage  Abdominal: Soft. Bowel sounds are normal.  Abdomen is obese.    Musculoskeletal: He exhibits edema, increased to hands. He exhibits no deformity.   Neurological:   Intubated and sedated    Skin: Skin is warm and dry. No rash noted. He is not diaphoretic. No erythema.   Psychiatric: He is sedated.  Nursing note and vitals reviewed.        Results from last 7 days   Lab Units 01/24/19  0632 01/23/19  0308 01/22/19  0257   WBC 10*3/mm3 24.26* 17.30* 16.47*   HEMOGLOBIN g/dL 11.7* 11.9* 11.6*   PLATELETS 10*3/mm3 292 334 312     Results from last 7 days   Lab Units 01/24/19  0632 01/23/19  0308 01/22/19  0257   SODIUM mmol/L 141 145 146*   POTASSIUM mmol/L 4.8 4.4 4.7   BUN mg/dL 56* 31* 41*   CREATININE mg/dL 1.10 0.73 0.75     Results from last 7 days   Lab Units 01/24/19  0348 01/23/19  1245 01/23/19  1130   PH, ARTERIAL pH units 7.375 7.368 7.403   PCO2, ARTERIAL mm Hg 55.0* 59.7* 55.2*   PO2 ART mm Hg 68.0* 85.6 62.0*   FIO2 % 100 100 100     Blood Culture   Date Value Ref Range Status   01/16/2019 No growth at 24 hours  Preliminary   01/16/2019 No growth at 24 hours  Preliminary     Respiratory Culture   Date Value Ref Range Status   01/15/2019 Rare Normal Respiratory Ana Lilia  Final   01/15/2019 Rare Candida albicans (A)  Final     Recent films:  Xr Chest 1 View    Result Date: 1/24/2019  Stable one day appearance of the chest. This report was finalized on 01/24/2019 07:49 by Dr. Anoop Brunson MD.    Xr Chest 1 View    Result Date: 1/23/2019  Impression: 1.   Increasing bilateral parenchymal opacities, concerning for worsening findings of infection..   This report was finalized on 01/23/2019 07:35 by Dr. Shanice Tabor  "MD.    Films reviewed personally by me.  My interpretation: Endotracheal tube in position, stable bilateral infiltrates    Pulmonary Assessment:    1. Acute hypoxemic respiratory fair requiring mechanical ventilation  2. Empyema, left  3. Type II diabetes  4. Thyroid disease  5. Acute kidney injury  6. Hypotension, resolved   7. Sepsis  8. Leukocytosis, worsening     Recommend:     · No vent changes, he continues to require 100% FiO2 and 14 of PEEP  · Continue mucomyst BID for thick lung secretions.   · Chest tube management per Dr. Oglesby with plans to add back TPA.  · Continue nebs scheduled  · Chest x-ray and ABG daily while on the ventilator  · Stress ulcer and DVT prophylaxis  · Antibiotics: Merrem, Vanc and Levaquin  · His wife was updated at bedside this morning.     Electronically signed by LUIS E Huerta on 2019 at 9:45 AM        Electronically signed by Reji Robledo APRN at 2019 10:00 AM     Berlin Oglesby MD at 2019  5:28 AM          Patient: Alan Carlin  : 1960     Procedure:     Procedure Date:     POD: * No surgery found *      Subjective   No change.  Sedated on ventilator requiring PEEP of 14 and FiO2 100%    Objective   Visit Vitals  BP (!) 150/133   Pulse 105   Temp 97.5 °F (36.4 °C) (Axillary)   Resp 20   Ht 175.3 cm (69\")   Wt (!) 156 kg (343 lb 0.6 oz)   SpO2 91%   BMI 50.66 kg/m²       Intake/Output Summary (Last 24 hours) at 2019 0528  Last data filed at 2019 0416  Gross per 24 hour   Intake 4842.35 ml   Output 2310 ml   Net 2532.35 ml                                        Lab Results:    CBC:   Results from last 7 days   Lab Units 19  0308 19  0257 19  0256   WBC 10*3/mm3 17.30* 16.47* 14.59*   HEMATOCRIT % 38.8* 38.0* 36.5*   PLATELETS 10*3/mm3 334 312 294     BMP:   Results from last 7 days   Lab Units 19  0308 19  0257 19  0256   SODIUM mmol/L 145 146* 145   POTASSIUM mmol/L 4.4 4.7 4.8 "   CHLORIDE mmol/L 105 105 107   CO2 mmol/L 33.0* 32.0* 33.0*   GLUCOSE mg/dL 99 132* 139*   BUN mg/dL 31* 41* 66*   CREATININE mg/dL 0.73 0.75 0.80     Coag:   Results from last 7 days   Lab Units 01/21/19  0256   INR  1.41*       Images:  Imaging Results (last 24 hours)     Procedure Component Value Units Date/Time    XR Chest 1 View [[id number]] Updated:  01/24/19 0405    XR Chest 1 View [[id number]] Collected:  01/23/19 0734     Updated:  01/23/19 0738    Narrative:       Frontal supine radiograph of the chest 1/23/2019 4:32 AM CST     History: intubated; A41.9-Sepsis, unspecified organism; R65.21-Severe  sepsis with septic shock; Z74.09-Other reduced mobility     Comparison: Chest x-ray dated 1/22/2019      Findings:   Lines and tubes are stable in position. Bilateral parenchymal opacities  have increased.. The cardiomediastinal silhouette and pulmonary  vascularity are unchanged.       No acute osseous or soft tissue abnormality is noted.        Impression:       Impression:   1.   Increasing bilateral parenchymal opacities, concerning for  worsening findings of infection..        This report was finalized on 01/23/2019 07:35 by Dr. Shanice Tabor MD.        Images Today: Not much change in images today.  It looks somewhat better but may be just due to the technique of the x-ray.  Right lung with significant pneumonia.  Left lung with continued atelectasis/effusion     Physical Exam:   Gen.: Sedated on ventilator.  Chest: Decreased sounds bilaterally with multiple rales and wheezes  Heart: Sinus tachycardia without murmur gallop or rub.    Impression: Unable to make much progress.  He continues to need more PEEP and FiO2 is 100%.  Continues to have a worsening pneumonia on the right plus his significant lung collapse/empyema on the left.  Dr. Grey did broaden the range of antibiotics yesterday.  We have not had any positive cultures to help direct the antibiotic coverage.  Worsening, his right lung is so bad  that any operative procedure on the left is occluded at the present time.  I had a long talk with the family yesterday and again this morning.  Talked with Dr. Grey yesterday and he is going to talk with the family about entertaining the possibility of transfer to tertiary thisis facility if that would be possible.  Prognosis is somewhat poor.    Plan: As above    Berlin Oglesby MD  01/24/19  5:28 AM          Electronically signed by Berlin Oglesby MD at 1/24/2019  5:40 AM       Consult Notes (last 24 hours) (Notes from 1/23/2019  3:54 PM through 1/24/2019  3:54 PM)     No notes of this type exist for this encounter.

## 2019-01-24 NOTE — PROGRESS NOTES
[facility] Medicine Services  INPATIENT PROGRESS NOTE    Patient Name: Alan Carlin  Date of Admission: 1/15/2019  Today's Date: 01/24/19  Length of Stay: 9  Primary Care Physician: Francy Dumont MD    Subjective   Chief Complaint: follow-up respiratory failure  HPI   Patient has had to have PEEP increased to 14 and Fi02 at 100% to maintain sats near 90%.  Family at bedside.  No significant residuals with tube feeds.  Remains sedated on propofol.  I had a conference with Dr. Oglesby yesterday; case discussed with Dr. Wright this morning.  Also discussed with spouse at bedside this morning.    Review of Systems     All pertinent negatives and positives are as above. All other systems have been reviewed and are negative unless otherwise stated.     Objective    Temp:  [97.5 °F (36.4 °C)-100.3 °F (37.9 °C)] 100.3 °F (37.9 °C)  Heart Rate:  [102-126] 111  Resp:  [20-26] 20  BP: ()/() 98/68  FiO2 (%):  [100 %] 100 %  Physical Exam   Constitutional: No distress.   Sedated on propofol; on mechanical ventilation   HENT:   Head: Normocephalic.   Mouth/Throat: No oropharyngeal exudate (ET tube in place).   Eyes: Pupils are equal, round, and reactive to light. No scleral icterus.   Neck: No tracheal deviation present.   Cardiovascular: Normal rate and regular rhythm.   Pulmonary/Chest: Effort normal. He has rales.   On 100% Fi02 and 14 PEEP; chest tube in place on the left   Abdominal: Soft. He exhibits no distension. There is no tenderness. There is no rebound.   Genitourinary:   Genitourinary Comments: Hammonds in place; small blistering lesions noted at urethral meatus insertion site of Hammonds catheter   Musculoskeletal: He exhibits edema.   LUE PICC; edema noted to RUE   Neurological: He is alert.   On propofol for sedation   Skin: Skin is warm and dry. He is not diaphoretic.   Vitals reviewed.      Results Review:  I have reviewed the labs, radiology results, and diagnostic  studies.    Laboratory Data:   Results from last 7 days   Lab Units 01/24/19  0632 01/23/19  0308 01/22/19  0257   WBC 10*3/mm3 24.26* 17.30* 16.47*   HEMOGLOBIN g/dL 11.7* 11.9* 11.6*   HEMATOCRIT % 38.2* 38.8* 38.0*   PLATELETS 10*3/mm3 292 334 312        Results from last 7 days   Lab Units 01/24/19  0632 01/23/19  0308 01/22/19  0257  01/19/19  0230 01/18/19  0418   SODIUM mmol/L 141 145 146*   < > 144 140   POTASSIUM mmol/L 4.8 4.4 4.7   < > 4.5 5.4*   CHLORIDE mmol/L 102 105 105   < > 104 102   CO2 mmol/L 32.0* 33.0* 32.0*   < > 30.0 31.0   BUN mg/dL 56* 31* 41*   < > 76* 72*   CREATININE mg/dL 1.10 0.73 0.75   < > 1.07 0.96   CALCIUM mg/dL 8.8 8.9 8.9   < > 8.8 8.6   BILIRUBIN mg/dL  --  0.4  --   --  0.3 0.4   ALK PHOS U/L  --  76  --   --  74 77   ALT (SGPT) U/L  --  32  --   --  30 34   AST (SGOT) U/L  --  26  --   --  42 42   GLUCOSE mg/dL 169* 99 132*   < > 160* 348*    < > = values in this interval not displayed.       Culture Data:   Blood Culture   Date Value Ref Range Status   01/16/2019 No growth at 5 days  Final   01/16/2019 No growth at 5 days  Final     MRSA SCREEN CX   Date Value Ref Range Status   01/16/2019   Final    No Methicillin Resistant Staphylococcus aureus isolated     Respiratory Culture   Date Value Ref Range Status   01/15/2019 Rare Normal Respiratory Ana Lilia  Final   01/15/2019 Rare Candida albicans (A)  Final       Radiology Data:   Imaging Results (last 24 hours)     Procedure Component Value Units Date/Time    XR Chest 1 View [[id number]] Collected:  01/24/19 0748     Updated:  01/24/19 0752    Narrative:       EXAMINATION: XR CHEST 1 VW- 1/24/2019 7:48 AM CST     HISTORY: Chest tube, intubated     COMPARISON: 1/23/2019     FINDINGS:  Endotracheal tube is in place with tip above the cesar. Enteric tube is  in place with tip below the diaphragm and out of the field-of-view. A  chest tube is in place with tip directed at the medial left lung base.  Diffuse airspace opacities are  again seen bilaterally, similar in  appearance to the previous exam. No definite pneumothorax is identified  on this exam. There is loculated pleural fluid in the upper left lung  field.       Impression:       Stable one day appearance of the chest.  This report was finalized on 01/24/2019 07:49 by Dr. Anoop Brunson MD.          I have reviewed the patient's current medications.     Assessment/Plan     Active Hospital Problems    Diagnosis   • PNA (pneumonia)   • Acute respiratory failure with hypercapnia (CMS/HCC)   • Controlled type 2 diabetes mellitus without complication, with long-term current use of insulin (CMS/HCC)     1) Acute hypoxic and acute on chronic hypercapnia respiratory failure due to left-sided pleural effusion and suspected empyema and right lower lobe pneumonia   2) Septic shock due to community-acquired pneumonia and empyema  3) Acute kidney injury, resolved  4) Morbid obesity   5) Normocytic anemia  6) Suppressed TSH, normal Free T4  7) Type 2 DM complicated by hyperglycemia  8) Hyperkalemia, improved  9) Constipation, resolved   10) Hypernatremia    Plan:  1.  Now on IV Vanco, Levaquin, and Merrem  2.  Was on Levaquin (received 3 days of tx) and Cefepime (received 4 days of tx); then transitioned to IV Zosyn for approx 5 days; then transitioned to IV Merrem and added back Levaquin on 1/23  3.  Appreciate CT Surg and Pulmonary input; case discussed with Dr. Oglesby and Dr. Wright within past 24hrs  4.  Certainly a difficult decision and very, very high risk regarding proceeding to surgery (VATS) in the setting of Fi02 at 100% and PEEP 14.  Pulmozyme and alteplase restarted.  5.  Follow-up cultures; currently negative  6.  Lovenox/Pepcid PPx  7.  Nebs scheduled; trial of Mucomyst nebs started by Pulmonary  8.  Last received Lasix on 1/19; repeat dose of IV Lasix given 1/23  9.  TFs currently at 35ml/hr; minimal residuals per nursing  10.  Insulin gtt per protocol for tight control of BSs  11.   ICU care  12.  Patient is critically ill  13.  Updated spouse at bedside this AM; will start contacting tertiary care medical centers this AM regarding arranging transfer.  Family understands risks of transferring in this high risk setting.      Tim Grey MD   01/24/19   8:23 AM

## 2019-01-24 NOTE — PROGRESS NOTES
PULMONARY AND CRITICAL CARE PROGRESS NOTE - [facility]    Patient: Alan Carlin    1960    MR# [id number]    [id number]  01/24/19   9:45 AM  Referring Provider: Tim Grey MD    Chief Complaint: Mechanically ventilated    Interval history: He remains intubated and sedated.  FiO2 remains at 100% and PEEP 14.  Chest x-ray is stable. Chest tube remains in place.   He had a temperature overnight.  His wife is at bedside this morning and has been updated.   Nutrition is being addressed.    Meds:    acetylcysteine 1.5 mL Nebulization BID - RT   custom medication builder 50 mL Intrapleural BID   enoxaparin 40 mg Subcutaneous Q24H   famotidine 20 mg Intravenous BID   guaiFENesin 400 mg Oral Q8H   ipratropium-albuterol 3 mL Nebulization Q4H - RT   levoFLOXacin 750 mg Intravenous Q24H   meropenem 2 g Intravenous Q8H   polyethylene glycol 17 g Oral Once   sodium chloride 3 mL Intravenous Q12H   sodium polystyrene 15 g Oral Once   vancomycin 1,250 mg Intravenous Q12H       insulin regular infusion 1 unit/mL 1-20 Units/hr Last Rate: 13 Units/hr (01/24/19 0859)   norepinephrine 0.02-0.3 mcg/kg/min Last Rate: Stopped (01/17/19 1730)   propofol 5-50 mcg/kg/min Last Rate: 50 mcg/kg/min (01/24/19 0858)     Review of Systems:   Cannot obtain due to mechanical ventilation.  The patient notably is critically ill and connected to a ventilator.  As such patient cannot communicate and provide any history whatsoever, including any history of present illness or interval history since arrival or review of systems. The interested reviewer may note this fact, as an attempt has been made at collecting and documenting these portions of the patient history, but this information is unobtainable despite attempted review and therefore cannot be documented at this time.     Ventilator Settings:     Vt (Set, L): 0.6 L  Resp Rate (Set): 20  Pressure Support (cm H2O): 0 cm H20  FiO2 (%): 100 %  PEEP/CPAP (cm  H2O): 14 cm H20  Minute Ventilation (L/min) (Obs): 13.4 L/min  Resp Rate (Observed) Vent: 22  I:E Ratio (Set): 1.10:1  I:E Ratio (Obs): 1:1.3  PIP Observed (cm H2O): 36 cm H2O     Physical Exam:  Temp:  [97.5 °F (36.4 °C)-100.3 °F (37.9 °C)] 100.3 °F (37.9 °C)  Heart Rate:  [102-126] 111  Resp:  [20-26] 20  BP: ()/() 98/68  FiO2 (%):  [100 %] 100 %    Intake/Output Summary (Last 24 hours) at 1/24/2019 0945  Last data filed at 1/24/2019 0755  Gross per 24 hour   Intake 4196.28 ml   Output 2270 ml   Net 1926.28 ml     SpO2 Percentage    01/24/19 0730 01/24/19 0738 01/24/19 0740   SpO2: (!) 89% 90% (!) 89%      Physical Exam:    Constitutional: He appears well-developed and well-nourished. No distress. Sedated and intubated.  Obese.  HENT: ET tube in place, OG with nutrition infusing.  Head: Normocephalic and atraumatic.   Eyes: Pupils are equal, round, and reactive to light. Right eye exhibits no discharge. Left eye exhibits no discharge.   Neck: Normal range of motion. Neck supple.   thick   Cardiovascular:  Tachycardia.   No murmur heard.  Pulmonary/Chest: He has decreased breath sounds in the left upper field and the left lower field.  bilateral rales.     left-sided chest tube in place with minimal drainage  Abdominal: Soft. Bowel sounds are normal.  Abdomen is obese.    Musculoskeletal: He exhibits edema, increased to hands. He exhibits no deformity.   Neurological:   Intubated and sedated    Skin: Skin is warm and dry. No rash noted. He is not diaphoretic. No erythema.   Psychiatric: He is sedated.  Nursing note and vitals reviewed.        Results from last 7 days   Lab Units 01/24/19  0632 01/23/19  0308 01/22/19  0257   WBC 10*3/mm3 24.26* 17.30* 16.47*   HEMOGLOBIN g/dL 11.7* 11.9* 11.6*   PLATELETS 10*3/mm3 292 334 312     Results from last 7 days   Lab Units 01/24/19  0632 01/23/19  0308 01/22/19  0257   SODIUM mmol/L 141 145 146*   POTASSIUM mmol/L 4.8 4.4 4.7   BUN mg/dL 56* 31* 41*    CREATININE mg/dL 1.10 0.73 0.75     Results from last 7 days   Lab Units 01/24/19  0348 01/23/19  1245 01/23/19  1130   PH, ARTERIAL pH units 7.375 7.368 7.403   PCO2, ARTERIAL mm Hg 55.0* 59.7* 55.2*   PO2 ART mm Hg 68.0* 85.6 62.0*   FIO2 % 100 100 100     Blood Culture   Date Value Ref Range Status   01/16/2019 No growth at 24 hours  Preliminary   01/16/2019 No growth at 24 hours  Preliminary     Respiratory Culture   Date Value Ref Range Status   01/15/2019 Rare Normal Respiratory Ana Lilia  Final   01/15/2019 Rare Candida albicans (A)  Final     Recent films:  Xr Chest 1 View    Result Date: 1/24/2019  Stable one day appearance of the chest. This report was finalized on 01/24/2019 07:49 by Dr. Anoop Brunson MD.    Xr Chest 1 View    Result Date: 1/23/2019  Impression: 1.   Increasing bilateral parenchymal opacities, concerning for worsening findings of infection..   This report was finalized on 01/23/2019 07:35 by Dr. Shanice Tabor MD.    Films reviewed personally by me.  My interpretation: Endotracheal tube in position, stable bilateral infiltrates    Pulmonary Assessment:    1. Acute hypoxemic respiratory fair requiring mechanical ventilation  2. Empyema, left  3. Type II diabetes  4. Thyroid disease  5. Acute kidney injury  6. Hypotension, resolved   7. Sepsis  8. Leukocytosis, worsening     Recommend:     · No vent changes, he continues to require 100% FiO2 and 14 of PEEP  · Continue mucomyst BID for thick lung secretions.   · Chest tube management per Dr. Oglesby with plans to add back TPA.  · Continue nebs scheduled  · Chest x-ray and ABG daily while on the ventilator  · Stress ulcer and DVT prophylaxis  · Antibiotics: Merrem, Vanc and Levaquin  · His wife was updated at bedside this morning.     Electronically signed by LUIS E Huerta on 1/24/2019 at 9:45 AM    Physician substantive contribution:  Pertinent symptoms/interval history include: pt remains on vent with maximal support with  high peep high fio2, cxr   Respiratory exam shows pertinent findings of:diminished breath sounds, no change, intubated.  abd obese..  Plan includes: discussed with medicine and ct surgery services.  No luck with attempt at transfer.  Prognosis extremely poor.  Continue high peep and sedation.  No other changes in vent for today as we are at maximal support already.  Discussed with family this am.  I have seen and examined patient personally, performing a face-to-face diagnostic evaluation with plan of care reviewed and developed with APRN and nursing staff. I have addended and/or modified the above history of present illness, physical examination, and assessment and plan to reflect my findings and impressions. Essential elements of the care plan were discussed with APRN above.  Agree with findings and assessment/plan as documented above.    Electronically signed by Damion Wright MD, on 1/24/2019, 9:25 PM

## 2019-01-25 NOTE — PROGRESS NOTES
PULMONARY AND CRITICAL CARE PROGRESS NOTE - [facility]    Patient: Alan Carlin    1960    MR# [id number]    [id number]  01/25/19   8:23 AM  Referring Provider: Aniceto Ott MD    Chief Complaint: Mechanically ventilated    Interval history: He remains intubated and sedated.  Levophed is infusing.  He had multiple issues overnight with low O2 sats, particularly when the patient is repositioned.  He is currently on 100% FiO2, PEEP of 14 with sats running 86-88% on the monitor.  PO2 on this mornings ABGs was 64.  Transferring the patient to  or [facility] is not an option and the case was discussed with both facilities per Dr. Gutierrez.  Chest x-ray is stable. Chest tube remains in place. His wife is at bedside this morning and has been updated by Dr. Wright. Per nursing and RT, she is considering comfort care options and would not want him to have CPR at this point.    Meds:    acetylcysteine 1.5 mL Nebulization BID - RT   custom medication builder 50 mL Intrapleural BID   enoxaparin 40 mg Subcutaneous Q24H   famotidine 20 mg Intravenous BID   furosemide 20 mg Intravenous Once   ipratropium-albuterol 3 mL Nebulization Q4H - RT   levoFLOXacin 750 mg Intravenous Q24H   meropenem 2 g Intravenous Q8H   polyethylene glycol 17 g Oral Once   sodium chloride 3 mL Intravenous Q12H   sodium polystyrene 15 g Oral Once   vancomycin 1,250 mg Intravenous Q12H       insulin regular infusion 1 unit/mL 1-20 Units/hr Last Rate: 11 Units/hr (01/25/19 0702)   norepinephrine 0.02-0.3 mcg/kg/min Last Rate: 0.02 mcg/kg/min (01/25/19 0023)   propofol 5-50 mcg/kg/min Last Rate: 30 mcg/kg/min (01/25/19 0746)     Review of Systems:   Cannot obtain due to mechanical ventilation.  The patient notably is critically ill and connected to a ventilator.  As such patient cannot communicate and provide any history whatsoever, including any history of present illness or interval history since arrival or review of  systems. The interested reviewer may note this fact, as an attempt has been made at collecting and documenting these portions of the patient history, but this information is unobtainable despite attempted review and therefore cannot be documented at this time.     Ventilator Settings:     Vt (Set, L): 0.6 L  Resp Rate (Set): 20  Pressure Support (cm H2O): 0 cm H20  FiO2 (%): 100 %  PEEP/CPAP (cm H2O): 17 cm H20(changed by Dr. Wright)  Minute Ventilation (L/min) (Obs): 14.2 L/min  Resp Rate (Observed) Vent: 33  I:E Ratio (Set): 1:1.30  I:E Ratio (Obs): 1:1.4  PIP Observed (cm H2O): 38 cm H2O     Physical Exam:  Temp:  [97 °F (36.1 °C)-99.5 °F (37.5 °C)] 99.3 °F (37.4 °C)  Heart Rate:  [107-131] 117  Resp:  [20-24] 21  BP: ()/(42-89) 92/55  FiO2 (%):  [100 %] 100 %    Intake/Output Summary (Last 24 hours) at 1/25/2019 0823  Last data filed at 1/25/2019 0746  Gross per 24 hour   Intake 2877.31 ml   Output 1885 ml   Net 992.31 ml     SpO2 Percentage    01/25/19 0745 01/25/19 0800 01/25/19 0815   SpO2: (!) 87% (!) 87% (!) 88%      Physical Exam:    Constitutional: He appears well-developed and well-nourished. No distress. Sedated and intubated.  Obese.  HENT: ET tube in place, OG with nutrition infusing.  Head: Normocephalic and atraumatic.   Eyes: Pupils are equal, round, and reactive to light. Right eye exhibits no discharge. Left eye exhibits no discharge.   Neck: supple, thick.  Cardiovascular:  Tachycardia.   No murmur heard.  Pulmonary/Chest: He has decreased breath sounds in the left upper field and the left lower field.  bilateral rales.  O2 sats 86-88% on monitor.     left-sided chest tube in place with minimal drainage  Abdominal: Soft. Bowel sounds are normal.  Abdomen is obese.    Musculoskeletal: He exhibits edema, increased to hands. He exhibits no deformity.   Neurological:   Intubated and sedated    Skin: Skin is warm and dry. No rash noted. He is not diaphoretic. No erythema.   Psychiatric: He is  sedated.  Nursing note and vitals reviewed.        Results from last 7 days   Lab Units 01/25/19  0729 01/24/19  0632 01/23/19  0308   WBC 10*3/mm3 27.17* 24.26* 17.30*   HEMOGLOBIN g/dL 12.6* 11.7* 11.9*   PLATELETS 10*3/mm3 336 292 334     Results from last 7 days   Lab Units 01/25/19  0729 01/24/19  0632 01/23/19  0308   SODIUM mmol/L 140 141 145   POTASSIUM mmol/L 5.0 4.8 4.4   BUN mg/dL 75* 56* 31*   CREATININE mg/dL 1.09 1.10 0.73     Results from last 7 days   Lab Units 01/25/19  0333 01/24/19  0348 01/23/19  1245   PH, ARTERIAL pH units 7.341* 7.375 7.368   PCO2, ARTERIAL mm Hg 58.4* 55.0* 59.7*   PO2 ART mm Hg 63.9* 68.0* 85.6   FIO2 % 100 100 100     Blood Culture   Date Value Ref Range Status   01/16/2019 No growth at 24 hours  Preliminary   01/16/2019 No growth at 24 hours  Preliminary     Respiratory Culture   Date Value Ref Range Status   01/15/2019 Rare Normal Respiratory Ana Lilia  Final   01/15/2019 Rare Candida albicans (A)  Final     Recent films:  Xr Chest 1 View    Result Date: 1/25/2019  Impression: 1.   No significant interval change since previous exam. 2.  Extensive consolidative process involving both lungs, right greater than left, overall unchanged.   This report was finalized on 01/25/2019 07:30 by Dr. Shanice Tabor MD.    Xr Chest 1 View    Result Date: 1/24/2019  Impression: 1.   Increasing bilateral confluent opacities, concerning for worsening infection..   This report was finalized on 01/24/2019 15:19 by Dr. Shanice Tabor MD.    Xr Chest 1 View    Result Date: 1/24/2019  Stable one day appearance of the chest. This report was finalized on 01/24/2019 07:49 by Dr. Anoop Brunson MD.    Films reviewed personally by me.  My interpretation: Endotracheal tube in position, persistent bilateral infiltrates    Pulmonary Assessment:    1. Acute hypoxemic respiratory fair requiring mechanical ventilation  2. Empyema, left  3. Type II diabetes  4. Thyroid disease  5. Acute kidney  injury  6. Hypotension, with pressor infusing   7. Sepsis  8. Leukocytosis, worsening   9. Obesity     Recommend:     · Peep has been increased to 17, FiO2 remains at 100%.  · Lasix 20 mg IV this morning.  · Day # 10 on vent  · Continue mucomyst BID for thick lung secretions.   · Chest tube management per CT surgery  · Continue nebs scheduled  · Chest x-ray and ABG daily while on the ventilator  · Stress ulcer and DVT prophylaxis  · Antibiotics: Merrem, Vanc and Levaquin  · His wife was updated at bedside this morning. The patient is not a candidate for transfer.     Electronically signed by LUIS E Huerta on 1/25/2019 at 8:23 AM    He is more or less unchanged.  We will try some diuresis today.  He is sedated on the ventilator.  He is on maximal and actually just about supramaximal support with very high PEEP and very high FiO2.  He has not tolerated movement around.  He has pulmonary edema on x-ray and he has diminished breath sounds bilaterally.  We will try to increase the PEEP up a little bit further.  This will push his plateau pressure into the mid 30s but hopefully that plateau pressure is fairly significantly accounted for by a mass of his chest wall and does not reflect true transpulmonary pressure.  Prognosis appears exceedingly poor.    I have seen and examined patient personally, performing a face-to-face diagnostic evaluation with plan of care reviewed and developed with APRN and nursing staff. I have addended and/or modified the above history of present illness, physical examination, and assessment and plan to reflect my findings and impressions. Essential elements of the care plan were discussed with APRN above.  Agree with findings and assessment/plan as documented above.    Electronically signed by Damion Wright MD, on 1/25/2019, 4:15 PM    EMR Dragon/Transcription disclaimer: Much of this encounter note is an electronic transcription/translation of spoken language to printed  text. The electronic translation of spoken language may permit erroneous, or at times, nonsensical words or phrases to be inadvertently transcribed; although I have reviewed the note for such errors, some may still exist.

## 2019-01-25 NOTE — PAYOR COMM NOTE
"FROM: JESSICA GORDON  PHONE: [phone]  FAX: [phone]    AUTH: [id number]    Alan Wheeler (58 y.o. Male)     Date of Birth Social Security Number Address Home Phone MRN    1960  [id number] [address] [phone] [id number]    [employer]          Christian        Admission Date Admission Type Admitting Provider Attending Provider Department, Room/Bed    1/15/19 Urgent Aniceto Ott MD Fleming, John Eric, MD [facility] INTENSIVE CARE, I012/1    Discharge Date Discharge Disposition Discharge Destination                       Attending Provider:  Aniceto Ott MD    Allergies:  No Known Allergies    Isolation:  None   Infection:  None   Code Status:  No CPR    Ht:  175.3 cm (69\")   Wt:  156 kg (343 lb 0.6 oz)    Admission Cmt:  None   Principal Problem:  None                Active Insurance as of 1/15/2019     Primary Coverage     Payor Plan Insurance Group Employer/Plan Group    [facility] [id number] Former[facility] [facility] [id number]     Payor Plan Address Payor Plan Phone Number Payor Plan Fax Number Effective Dates    [address] [phone]  6/1/2010 - None Entered    [place] [address]       Subscriber Name Subscriber Birth Date Member ID       FOUZIA WHEELER [birth year]1961 [id number]                 Emergency Contacts      (Rel.) Home Phone Work Phone Mobile Phone    Fouzia Wheeler (Spouse) [phone] --            Hospital Medications (all)       Dose Frequency Start End    acetaminophen (TYLENOL) suppository 650 mg 650 mg Every 4 Hours PRN 1/16/2019     Sig - Route: Insert 1 suppository into the rectum Every 4 (Four) Hours As Needed for Fever (temperature greater than 101.5 F or headache). - Rectal    Linked Group 1:  \"Or\" Linked Group Details        acetaminophen (TYLENOL) tablet 650 mg 650 mg Every 4 Hours PRN 1/16/2019     Sig - Route: Take 2 tablets by mouth Every 4 (Four) Hours As " "Needed for Headache or Fever (fever greater than 101.5 F). - Oral    Linked Group 1:  \"Or\" Linked Group Details        acetylcysteine (MUCOMYST) 20 % nebulizer solution 1.5 mL 1.5 mL 2 Times Daily - RT 1/23/2019     Sig - Route: Take 1.5 mL by nebulization 2 (Two) Times a Day. - Nebulization    albuterol (PROVENTIL) nebulizer solution 0.083% 2.5 mg/3mL 2.5 mg Once As Needed 1/16/2019     Sig - Route: Take 2.5 mg by nebulization 1 (One) Time As Needed for Shortness of Air (Sputum Induction). - Nebulization    alteplase ((CATHFLO/ACTIVASE)) 10 mg, dornase alpha (PULMOZYME) 5 mg, sodium chloride 0.9 % 35 mL compound 50 mL 2 Times Daily 1/24/2019 1/29/2019    Sig - Route: 50 mL by Intrapleural route 2 (Two) Times a Day. - Intrapleural    alteplase ((CATHFLO/ACTIVASE)) 10 mg, dornase alpha (PULMOZYME) 5 mg, sodium chloride 0.9 % 50 mL compound 50 mL 2 Times Daily 1/17/2019 1/21/2019    Sig - Route: 50 mL by Intrapleural route 2 (Two) Times a Day. - Intrapleural    bisacodyl (DULCOLAX) suppository 10 mg 10 mg Once 1/18/2019 1/18/2019    Sig - Route: Insert 1 suppository into the rectum 1 (One) Time. - Rectal    cefepime (MAXIPIME) 2 g/100 mL 0.9% NS (mbp) 2 g Once 1/16/2019 1/16/2019    Sig - Route: Infuse 100 mL into a venous catheter 1 (One) Time. - Intravenous    dextrose (D50W) 25 g/ 50mL Intravenous Solution 25-50 mL 25-50 mL Every 30 Minutes PRN 1/18/2019     Sig - Route: Infuse 25-50 mL into a venous catheter Every 30 (Thirty) Minutes As Needed for Low Blood Sugar (Blood Glucose <70 mg/dL; Per Insulin Infusion Protocol). - Intravenous    dextrose (GLUTOSE) oral gel 15 g 15 g Every 15 Minutes PRN 1/16/2019     Sig - Route: Take 15 g by mouth Every 15 (Fifteen) Minutes As Needed for Low Blood Sugar (Blood sugar less than 70). - Oral    famotidine (PEPCID) injection 20 mg 20 mg 2 Times Daily 1/16/2019     Sig - Route: Infuse 2 mL into a venous catheter 2 (Two) Times a Day. - Intravenous    fentaNYL citrate (PF) " (SUBLIMAZE) 5 mcg/mL in sodium chloride 0.9 % 250 mL infusion 0.5 mcg/kg/hr × 156 kg Continuous 1/25/2019 2/4/2019    Sig - Route: Infuse 78 mcg/hr into a venous catheter Continuous. - Intravenous    fentaNYL citrate (PF) (SUBLIMAZE) injection 25 mcg 25 mcg Every 2 Hours PRN 1/18/2019 1/28/2019    Sig - Route: Infuse 0.5 mL into a venous catheter Every 2 (Two) Hours As Needed for Severe Pain . - Intravenous    fentaNYL citrate (PF) (SUBLIMAZE) injection 50 mcg 50 mcg Every 1 Hour PRN 1/25/2019 2/4/2019    Sig - Route: Infuse 1 mL into a venous catheter Every 1 (One) Hour As Needed for Severe Pain . - Intravenous    furosemide (LASIX) injection 20 mg 20 mg Once 1/25/2019 1/25/2019    Sig - Route: Infuse 2 mL into a venous catheter 1 (One) Time. - Intravenous    furosemide (LASIX) injection 40 mg 40 mg Once 1/17/2019 1/17/2019    Sig - Route: Infuse 4 mL into a venous catheter 1 (One) Time. - Intravenous    furosemide (LASIX) injection 40 mg 40 mg Once 1/18/2019 1/18/2019    Sig - Route: Infuse 4 mL into a venous catheter 1 (One) Time. - Intravenous    furosemide (LASIX) injection 40 mg 40 mg Once 1/23/2019 1/23/2019    Sig - Route: Infuse 4 mL into a venous catheter 1 (One) Time. - Intravenous    glucagon (human recombinant) (GLUCAGEN DIAGNOSTIC) injection 1 mg 1 mg As Needed 1/16/2019     Sig - Route: Inject 1 mg under the skin into the appropriate area as directed As Needed (Blood Glucose Less Than 70). - Subcutaneous    insulin regular (HumuLIN R,NovoLIN R) 100 Units in sodium chloride 0.9 % 100 mL (1 Units/mL) infusion 1-20 Units/hr Titrated 1/18/2019     Sig - Route: Infuse 1-20 Units/hr into a venous catheter Dose Adjusted By Provider As Needed. - Intravenous    ipratropium (ATROVENT) nebulizer solution 0.5 mg 0.5 mg Every 4 Hours PRN 1/16/2019     Sig - Route: Take 2.5 mL by nebulization Every 4 (Four) Hours As Needed for Shortness of Air. - Nebulization    ipratropium-albuterol (DUO-NEB) nebulizer solution 3  mL 3 mL Every 4 Hours - RT 1/16/2019     Sig - Route: Take 3 mL by nebulization Every 4 (Four) Hours. - Nebulization    levoFLOXacin (LEVAQUIN) 750 mg/150 mL D5W (premix) (LEVAQUIN) 750 mg 750 mg Every 24 Hours 1/23/2019 1/30/2019    Sig - Route: Infuse 150 mL into a venous catheter Daily. - Intravenous    lidocaine (XYLOCAINE) 1 % injection  - ADS Override Pull   1/15/2019 1/15/2019    Notes to Pharmacy: Created by cabinet override    lidocaine (XYLOCAINE) 1 % injection  - ADS Override Pull   1/16/2019 1/16/2019    Notes to Pharmacy: Created by cabinet override    lidocaine (XYLOCAINE) 1 % injection  - ADS Override Pull   1/16/2019 1/16/2019    Notes to Pharmacy: Created by cabinet override    lidocaine PF 1% (XYLOCAINE) injection 1 mL 1 mL Once 1/18/2019 1/18/2019    Sig - Route: Inject 1 mL as directed 1 (One) Time. - Injection    Cosign for Ordering: Accepted by Aniceto Ott MD on 1/18/2019  5:11 PM    LORazepam (ATIVAN) injection 1 mg 1 mg Every 4 Hours PRN 1/22/2019 2/1/2019    Sig - Route: Infuse 0.5 mL into a venous catheter Every 4 (Four) Hours As Needed for Anxiety. - Intravenous    meropenem (MERREM) 2 g in sodium chloride 0.9 % 100 mL IVPB 2 g Every 8 Hours 1/23/2019 1/30/2019    Sig - Route: Infuse 2 g into a venous catheter Every 8 (Eight) Hours. - Intravenous    Morphine sulfate (PF) 2 MG/ML injection  - ADS Override Pull   1/16/2019 1/16/2019    Notes to Pharmacy: Created by cabinet override    norepinephrine (LEVOPHED) 1 MG/ML injection  - ADS Override Pull   1/24/2019 1/24/2019    Notes to Pharmacy: Created by cabinet override    norepinephrine (LEVOPHED) 8,000 mcg in Sodium chloride 0.9 % 250 mL (32 mcg/mL) infusion 0.02-0.3 mcg/kg/min × 154 kg Titrated 1/15/2019     Sig - Route: Infuse 3.08-46.2 mcg/min into a venous catheter Dose Adjusted By Provider As Needed. - Intravenous    ondansetron (ZOFRAN) injection 4 mg 4 mg Every 6 Hours PRN 1/16/2019     Sig - Route: Infuse 2 mL into a venous  "catheter Every 6 (Six) Hours As Needed for Nausea or Vomiting. - Intravenous    Linked Group 2:  \"Or\" Linked Group Details        ondansetron (ZOFRAN) tablet 4 mg 4 mg Every 6 Hours PRN 1/16/2019     Sig - Route: Take 1 tablet by mouth Every 6 (Six) Hours As Needed for Nausea or Vomiting. - Oral    Linked Group 2:  \"Or\" Linked Group Details        ondansetron ODT (ZOFRAN-ODT) disintegrating tablet 4 mg 4 mg Every 6 Hours PRN 1/16/2019     Sig - Route: Take 1 tablet by mouth Every 6 (Six) Hours As Needed for Nausea or Vomiting. - Oral    Linked Group 2:  \"Or\" Linked Group Details        piperacillin-tazobactam (ZOSYN) 4.5 g in iso-osmotic dextrose 100 mL IVPB (premix) 4.5 g Once 1/19/2019 1/19/2019    Sig - Route: Infuse 100 mL into a venous catheter 1 (One) Time. - Intravenous    polyethylene glycol 3350 powder (packet) 17 g Once 1/17/2019     Sig - Route: Take 17 g by mouth 1 (One) Time. - Oral    propofol (DIPRIVAN) infusion 10 mg/mL 100 mL 5-50 mcg/kg/min × 154 kg Titrated 1/15/2019     Sig - Route: Infuse 770-7,700 mcg/min into a venous catheter Dose Adjusted By Provider As Needed. - Intravenous    sodium chloride 0.9 % bolus 250 mL 250 mL Once 1/19/2019 1/19/2019    Sig - Route: Infuse 250 mL into a venous catheter 1 (One) Time. - Intravenous    sodium chloride 0.9 % flush 3 mL 3 mL Every 12 Hours Scheduled 1/16/2019     Sig - Route: Infuse 3 mL into a venous catheter Every 12 (Twelve) Hours. - Intravenous    sodium chloride 0.9 % flush 3-10 mL 3-10 mL As Needed 1/16/2019     Sig - Route: Infuse 3-10 mL into a venous catheter As Needed for Line Care. - Intravenous    sodium polystyrene (KAYEXALATE) powder 15 g 15 g Once 1/17/2019     Sig - Route: Take 15 g by mouth 1 (One) Time. - Oral    vancomycin (VANCOCIN) 2,000 mg in Sodium chloride 0.9 % 500 mL IVPB 2,000 mg Once 1/16/2019 1/16/2019    Sig - Route: Infuse 2,000 mg into a venous catheter 1 (One) Time. - Intravenous    Linked Group 3:  \"Followed by\" Linked " "Group Details        vancomycin 1250 mg/250 mL 0.9% NS IVPB ([facility]) 1,250 mg Every 12 Hours 1/22/2019 1/29/2019    Sig - Route: Infuse 250 mL into a venous catheter Every 12 (Twelve) Hours. - Intravenous    albuterol (PROVENTIL) nebulizer solution 0.083% 2.5 mg/3mL (Discontinued) 2.5 mg Every 4 Hours PRN 1/16/2019 1/16/2019    Sig - Route: Take 2.5 mg by nebulization Every 4 (Four) Hours As Needed for Shortness of Air. - Nebulization    alteplase ((CATHFLO/ACTIVASE)) 10 mg, dornase alpha (PULMOZYME) 5 mg compound (Discontinued) 50 mL 2 Times Daily 1/17/2019 1/17/2019    Sig - Route: 50 mL by Intrapleural route 2 (Two) Times a Day. - Intrapleural    Reason for Discontinue: *Re-Entry    cefepime (MAXIPIME) 2 g/100 mL 0.9% NS (mbp) (Discontinued) 2 g Every 8 Hours Scheduled 1/16/2019 1/16/2019    Sig - Route: Infuse 100 mL into a venous catheter Every 8 (Eight) Hours. - Intravenous    Reason for Discontinue: Dose adjustment    Linked Group 4:  \"And\" Linked Group Details        cefepime (MAXIPIME) 2 g/100 mL 0.9% NS (mbp) (Discontinued) 2 g Every 8 Hours 1/16/2019 1/19/2019    Sig - Route: Infuse 100 mL into a venous catheter Every 8 (Eight) Hours. - Intravenous    chlorhexidine (PERIDEX) 0.12 % solution 15 mL (Discontinued) 15 mL Every 12 Hours Scheduled 1/16/2019 1/16/2019    Sig - Route: Apply 15 mL to the mouth or throat Every 12 (Twelve) Hours. - Mouth/Throat    dextrose (D50W) 25 g/ 50mL Intravenous Solution 25 g (Discontinued) 25 g Every 15 Minutes PRN 1/16/2019 1/16/2019    Sig - Route: Infuse 50 mL into a venous catheter Every 15 (Fifteen) Minutes As Needed for Low Blood Sugar (Blood Sugar Less Than 70). - Intravenous    dextrose (D50W) 25 g/ 50mL Intravenous Solution 25 g (Discontinued) 25 g Every 15 Minutes PRN 1/16/2019 1/16/2019    Sig - Route: Infuse 50 mL into a venous catheter Every 15 (Fifteen) Minutes As Needed for Low Blood Sugar (Blood Sugar Less Than 70). - Intravenous    Reason for Discontinue: " Duplicate order    dextrose (D50W) 25 g/ 50mL Intravenous Solution 25 g (Discontinued) 25 g Every 15 Minutes PRN 1/16/2019 1/18/2019    Sig - Route: Infuse 50 mL into a venous catheter Every 15 (Fifteen) Minutes As Needed for Low Blood Sugar (Blood Sugar Less Than 70). - Intravenous    Reason for Discontinue: Duplicate order    dextrose (D50W) 25 g/ 50mL Intravenous Solution 25-50 mL (Discontinued) 25-50 mL Every 30 Minutes PRN 1/16/2019 1/16/2019    Sig - Route: Infuse 25-50 mL into a venous catheter Every 30 (Thirty) Minutes As Needed for Low Blood Sugar (Blood Glucose <70 mg/dL; Per Insulin Infusion Protocol). - Intravenous    dextrose (GLUTOSE) oral gel 15 g (Discontinued) 15 g Every 15 Minutes PRN 1/16/2019 1/16/2019    Sig - Route: Take 15 g by mouth Every 15 (Fifteen) Minutes As Needed for Low Blood Sugar (Blood sugar less than 70). - Oral    dextrose (GLUTOSE) oral gel 15 g (Discontinued) 15 g Every 15 Minutes PRN 1/16/2019 1/16/2019    Sig - Route: Take 15 g by mouth Every 15 (Fifteen) Minutes As Needed for Low Blood Sugar (Blood sugar less than 70). - Oral    Reason for Discontinue: Duplicate order    enoxaparin (LOVENOX) syringe 40 mg (Discontinued) 40 mg Every 24 Hours 1/16/2019 1/25/2019    Sig - Route: Inject 0.4 mL under the skin into the appropriate area as directed Daily. - Subcutaneous    furosemide (LASIX) injection 40 mg (Discontinued) 40 mg 2 Times Daily (Diuretics) 1/18/2019 1/19/2019    Sig - Route: Infuse 4 mL into a venous catheter 2 (Two) Times a Day. - Intravenous    glucagon (human recombinant) (GLUCAGEN DIAGNOSTIC) injection 1 mg (Discontinued) 1 mg As Needed 1/16/2019 1/16/2019    Sig - Route: Inject 1 mg under the skin into the appropriate area as directed As Needed (Blood Glucose Less Than 70). - Subcutaneous    glucagon (human recombinant) (GLUCAGEN DIAGNOSTIC) injection 1 mg (Discontinued) 1 mg As Needed 1/16/2019 1/16/2019    Sig - Route: Inject 1 mg under the skin into the  appropriate area as directed As Needed (Blood Glucose Less Than 70). - Subcutaneous    Reason for Discontinue: Duplicate order    guaiFENesin (ROBITUSSIN) 100 MG/5ML oral solution 400 mg (Discontinued) 400 mg Every 8 Hours Scheduled 1/16/2019 1/24/2019    Sig - Route: Take 20 mL by mouth Every 8 (Eight) Hours. - Oral    insulin lispro (humaLOG) injection 0-14 Units (Discontinued) 0-14 Units 4 Times Daily With Meals & Nightly 1/16/2019 1/16/2019    Sig - Route: Inject 0-14 Units under the skin into the appropriate area as directed 4 (Four) Times a Day With Meals & at Bedtime. - Subcutaneous    insulin lispro (humaLOG) injection 0-24 Units (Discontinued) 0-24 Units 4 Times Daily With Meals & Nightly 1/16/2019 1/16/2019    Sig - Route: Inject 0-24 Units under the skin into the appropriate area as directed 4 (Four) Times a Day With Meals & at Bedtime. - Subcutaneous    insulin lispro (humaLOG) injection 0-9 Units (Discontinued) 0-9 Units 4 Times Daily With Meals & Nightly 1/16/2019 1/17/2019    Sig - Route: Inject 0-9 Units under the skin into the appropriate area as directed 4 (Four) Times a Day With Meals & at Bedtime. - Subcutaneous    insulin lispro (humaLOG) injection 0-9 Units (Discontinued) 0-9 Units Every 6 Hours 1/18/2019 1/18/2019    Sig - Route: Inject 0-9 Units under the skin into the appropriate area as directed Every 6 (Six) Hours. - Subcutaneous    Cosign for Ordering: Accepted by Aniceto Ott MD on 1/18/2019  8:30 AM    insulin regular (HumuLIN R,NovoLIN R) 100 Units in Sodium chloride 0.9 % 100 mL (1 Units/mL) infusion (Discontinued) 1-20 Units/hr Titrated 1/16/2019 1/16/2019    Sig - Route: Infuse 1-20 Units/hr into a venous catheter Dose Adjusted By Provider As Needed. - Intravenous    levoFLOXacin (LEVAQUIN) 750 mg/150 mL D5W (premix) (LEVAQUIN) 750 mg (Discontinued) 750 mg Every 24 Hours 1/16/2019 1/18/2019    Sig - Route: Infuse 150 mL into a venous catheter Daily. - Intravenous    Linked  "Group 4:  \"And\" Linked Group Details        Morphine (PF) 10 MG/ML injection  - ADS Override Pull (Discontinued)   1/16/2019 1/16/2019    Notes to Pharmacy: Created by cabinet override    Morphine injection 5 mg (Discontinued) 5 mg Once 1/16/2019 1/16/2019    Sig - Route: Infuse 0.5 mL into a venous catheter 1 (One) Time. - Intravenous    Morphine sulfate (PF) injection 2 mg (Discontinued) 2 mg Once 1/16/2019 1/19/2019    Sig - Route: Infuse 1 mL into a venous catheter 1 (One) Time. - Intravenous    Reason for Discontinue: *Therapy completed    Pharmacy to dose vancomycin (Discontinued)  Continuous PRN 1/16/2019 1/16/2019    Sig - Route: Continuous As Needed for Consult. - Does not apply    Reason for Discontinue: Reorder    Linked Group 4:  \"And\" Linked Group Details        Pharmacy to dose vancomycin (Discontinued)  Continuous PRN 1/22/2019 1/22/2019    Sig - Route: Continuous As Needed for Consult. - Does not apply    Reason for Discontinue: Reorder    piperacillin-tazobactam (ZOSYN) 4.5 g in iso-osmotic dextrose 100 mL IVPB (premix) (Discontinued) 4.5 g Every 8 Hours 1/19/2019 1/23/2019    Sig - Route: Infuse 100 mL into a venous catheter Every 8 (Eight) Hours. - Intravenous    Sodium chloride 0.9 % infusion (Discontinued) 50 mL/hr Continuous 1/16/2019 1/19/2019    Sig - Route: Infuse 50 mL/hr into a venous catheter Continuous. - Intravenous    vancomycin (VANCOCIN) 3,000 mg in Sodium chloride 0.9 % 500 mL IVPB (Discontinued) 20 mg/kg × 154 kg Once 1/16/2019 1/16/2019    Sig - Route: Infuse 3,000 mg into a venous catheter 1 (One) Time. - Intravenous    Reason for Discontinue: Dose adjustment    Linked Group 4:  \"And\" Linked Group Details        vancomycin 1250 mg/250 mL 0.9% NS IVPB ([facility]) (Discontinued) 1,250 mg Every 12 Hours 1/16/2019 1/19/2019    Sig - Route: Infuse 250 mL into a venous catheter Every 12 (Twelve) Hours. - Intravenous    Linked Group 3:  \"Followed by\" Linked Group Details              Orders " (last 24 hrs)     Start     Ordered    01/25/19 1030  fentaNYL citrate (PF) (SUBLIMAZE) 5 mcg/mL in sodium chloride 0.9 % 250 mL infusion  Continuous      01/25/19 0931    01/25/19 0931  fentaNYL citrate (PF) (SUBLIMAZE) injection 50 mcg  Every 1 Hour PRN      01/25/19 0931    01/25/19 0928  Code Status and Medical Interventions:  Continuous      01/25/19 0927    01/25/19 0915  furosemide (LASIX) injection 20 mg  Once      01/25/19 0819    01/25/19 0827  POC Glucose Once  Once      01/25/19 0812    01/25/19 0720  Basic Metabolic Panel  STAT      01/25/19 0719    01/25/19 0720  CBC (No Diff)  STAT      01/25/19 0719    01/25/19 0638  POC Glucose Once  Once      01/25/19 0623    01/25/19 0524  POC Glucose Once  Once      01/25/19 0509    01/25/19 0427  POC Glucose Once  Once      01/25/19 0404    01/25/19 0340  Blood Gas, Arterial  Once      01/25/19 0333    01/25/19 0306  POC Glucose Once  Once      01/25/19 0244    01/25/19 0300  XR Chest 1 View  Daily      01/24/19 0542    01/25/19 0147  POC Glucose Once  Once      01/25/19 0125    01/25/19 0019  POC Glucose Once  Once      01/24/19 2358    01/24/19 2325  POC Glucose Once  Once      01/24/19 2303    01/24/19 2235  POC Glucose Once  Once      01/24/19 2208    01/24/19 2119  POC Glucose Once  Once      01/24/19 2050    01/24/19 1757  POC Glucose Once  Once      01/24/19 1745    01/24/19 1639  POC Glucose Once  Once      01/24/19 1627    01/24/19 1455  POC Glucose Once  Once      01/24/19 1443    01/24/19 1211  POC Glucose Once  Once      01/24/19 1156    01/24/19 1111  XR Chest 1 View  1 Time Imaging      01/24/19 1111    01/24/19 1107  norepinephrine (LEVOPHED) 1 MG/ML injection  - ADS Override Pull     Comments:  Created by cabinet override    01/24/19 1107    01/24/19 1030  alteplase ((CATHFLO/ACTIVASE)) 10 mg, dornase alpha (PULMOZYME) 5 mg, sodium chloride 0.9 % 35 mL compound  2 Times Daily      01/24/19 0941    01/24/19 1025  POC Glucose Once  Once       01/24/19 1012    01/24/19 0800  Beneprotein  3 Times Daily     Comments:  BENEPROTEIN MIX 3 packets EACH TIME TID per day.  Mix 3 packets with 200 ml water per OG tube, flush with 60 ml water after protein modular per OG.    01/23/19 2106    01/23/19 1315  acetylcysteine (MUCOMYST) 20 % nebulizer solution 1.5 mL  2 Times Daily - RT      01/23/19 1217    01/23/19 0915  levoFLOXacin (LEVAQUIN) 750 mg/150 mL D5W (premix) (LEVAQUIN) 750 mg  Every 24 Hours      01/23/19 0821    01/23/19 0900  meropenem (MERREM) 2 g in sodium chloride 0.9 % 100 mL IVPB  Every 8 Hours      01/23/19 0812    01/22/19 2000  vancomycin 1250 mg/250 mL 0.9% NS IVPB ([facility])  Every 12 Hours      01/22/19 1856    01/22/19 1845  LORazepam (ATIVAN) injection 1 mg  Every 4 Hours PRN      01/22/19 1845    01/21/19 1400  POC Glucose Q2H  Every 2 Hours      01/21/19 1310    01/18/19 1446  fentaNYL citrate (PF) (SUBLIMAZE) injection 25 mcg  Every 2 Hours PRN      01/18/19 1446    01/18/19 0930  insulin regular (HumuLIN R,NovoLIN R) 100 Units in sodium chloride 0.9 % 100 mL (1 Units/mL) infusion  Titrated      01/18/19 0834    01/18/19 0833  dextrose (D50W) 25 g/ 50mL Intravenous Solution 25-50 mL  Every 30 Minutes PRN      01/18/19 0834    01/17/19 0915  sodium polystyrene (KAYEXALATE) powder 15 g  Once      01/17/19 0819    01/17/19 0915  polyethylene glycol 3350 powder (packet)  Once      01/17/19 0819    01/16/19 1800  POC Glucose Q6H  Every 6 Hours      01/16/19 1740    01/16/19 1739  dextrose (GLUTOSE) oral gel 15 g  Every 15 Minutes PRN      01/16/19 1740    01/16/19 1739  glucagon (human recombinant) (GLUCAGEN DIAGNOSTIC) injection 1 mg  As Needed      01/16/19 1740    01/16/19 1130  ipratropium-albuterol (DUO-NEB) nebulizer solution 3 mL  Every 4 Hours - RT      01/16/19 0827    01/16/19 0900  enoxaparin (LOVENOX) syringe 40 mg  Every 24 Hours,   Status:  Discontinued      01/16/19 0016    01/16/19 0900  famotidine (PEPCID) injection 20 mg  2 Times  Daily      01/16/19 0016    01/16/19 0600  guaiFENesin (ROBITUSSIN) 100 MG/5ML oral solution 400 mg  Every 8 Hours Scheduled,   Status:  Discontinued      01/16/19 0016    01/16/19 0115  sodium chloride 0.9 % flush 3 mL  Every 12 Hours Scheduled      01/16/19 0016    01/16/19 0013  ondansetron (ZOFRAN) tablet 4 mg  Every 6 Hours PRN      01/16/19 0016    01/16/19 0013  ondansetron ODT (ZOFRAN-ODT) disintegrating tablet 4 mg  Every 6 Hours PRN      01/16/19 0016    01/16/19 0013  ondansetron (ZOFRAN) injection 4 mg  Every 6 Hours PRN      01/16/19 0016    01/16/19 0012  acetaminophen (TYLENOL) tablet 650 mg  Every 4 Hours PRN      01/16/19 0016    01/16/19 0012  acetaminophen (TYLENOL) suppository 650 mg  Every 4 Hours PRN      01/16/19 0016    01/16/19 0010  albuterol (PROVENTIL) nebulizer solution 0.083% 2.5 mg/3mL  Once As Needed      01/16/19 0016    01/16/19 0007  ipratropium (ATROVENT) nebulizer solution 0.5 mg  Every 4 Hours PRN      01/16/19 0016    01/16/19 0002  sodium chloride 0.9 % flush 3-10 mL  As Needed      01/16/19 0016    01/15/19 2130  norepinephrine (LEVOPHED) 8,000 mcg in Sodium chloride 0.9 % 250 mL (32 mcg/mL) infusion  Titrated      01/15/19 2044    01/15/19 2030  propofol (DIPRIVAN) infusion 10 mg/mL 100 mL  Titrated      01/15/19 1944    Unscheduled  Blood Gas, Arterial  As Needed     Comments:  Respiratory Distress      01/16/19 0016    Unscheduled  Subglottic Suctioning Must Be Done Every 6 Hours  As Needed      01/16/19 0016    Unscheduled  Sputum Induction if Necessary  As Needed      01/16/19 0016    Unscheduled  Sean and Document Tube Depth (in cm)  As Needed      01/16/19 1437    Unscheduled  Oral Care  As Needed      01/16/19 1437    Unscheduled  Verify Tube Placement Initially & As Needed  As Needed      01/16/19 1437    Unscheduled  Flush Feeding Tube With 30-50mL Water As Needed  As Needed      01/16/19 1437    Unscheduled  Residual Volume Assessment - Gastric Feedings  As Needed      Comments:  Measure Gastric Residual Volume if Patient Complains of Nausea, Abdominal Distention, Pain, Tenderness or Firmness  If Residual Volume Greater Than 500 mL, Re-Feed Amount That Was Removed, Hold Tube Feeding for 1 Hour & Recheck Residual.  Notify Provider if Second Residual Remains Greater Than 500 mL  Document Residual Volume, Amount Discarded & Appearance of Residual.    01/16/19 1437    --  lisinopril (PRINIVIL,ZESTRIL) 40 MG tablet  Daily      01/15/19 2300    --  hydrochlorothiazide (HYDRODIURIL) 25 MG tablet  Daily      01/15/19 2300    --  cetirizine (zyrTEC) 10 MG tablet  Daily      01/15/19 2300    --  Empagliflozin (JARDIANCE) 10 MG tablet  Daily      01/16/19 0402    --  rosuvastatin (CRESTOR) 5 MG tablet  Nightly      01/16/19 0402    --  fluticasone (FLONASE) 50 MCG/ACT nasal spray  2 Times Daily      01/16/19 0407    --  promethazine-dextromethorphan (PROMETHAZINE-DM) 6.25-15 MG/5ML syrup  Every 6 Hours PRN      01/16/19 0407    --  SCANNED - TELEMETRY        01/15/19 0000    --  cholecalciferol (VITAMIN D3) 1000 units tablet  Daily      01/17/19 1144    --  Insulin Degludec (TRESIBA FLEXTOUCH) 200 UNIT/ML solution pen-injector  Nightly      01/17/19 1144    --  acetaminophen (TYLENOL) 325 MG tablet  2 Times Daily      01/17/19 1146    --  SCANNED - TELEMETRY        01/15/19 0000    --  SCANNED - TELEMETRY        01/15/19 0000    --  SCANNED - TELEMETRY        01/15/19 0000    --  SCANNED - TELEMETRY        01/15/19 0000    --  SCANNED - TELEMETRY        01/15/19 0000    --  SCANNED - LABS      01/15/19 0000    --  SCANNED - TELEMETRY        01/15/19 0000    --  SCANNED - TELEMETRY        01/15/19 0000    --  SCANNED - TELEMETRY        01/15/19 0000          Ventilator/Non-Invasive Ventilation Settings (From admission, onward)    Start     Ordered    01/23/19 1216  Ventilator - AC/VC; (18); 90; 90%; Other (see comments); 14  Continuous     Question Answer Comment   Vent Mode AC/VC     Breath rate  18   FiO2 90    FiO2 titrate for Sp02% =/> 90%    PEEP Other (see comments)    PEEP: 14        01/23/19 1216    01/19/19 1338  Ventilator - AC/PC; (18); 90; 90%; Other (see comments); 12.5; 18  Continuous,   Status:  Canceled     Question Answer Comment   Vent Mode AC/PC    Breath rate  18   FiO2 90    FiO2 titrate for Sp02% =/> 90%    PEEP Other (see comments)    PEEP: 12.5    Inspiratory Pressure 18        01/20/19 1648    01/16/19 0824  Ventilator - AC/VC; (18); 60; 90%; Other (see comments); 7; 600  Continuous,   Status:  Canceled     Question Answer Comment   Vent Mode AC/VC    Breath rate  18   FiO2 60    FiO2 titrate for Sp02% =/> 90%    PEEP Other (see comments)    PEEP: 7    Tidal Volume 600        01/16/19 0824    01/16/19 0033  Ventilator - AC/VC; (22); 100; 90%; 5; (700)  Continuous,   Status:  Canceled     Question Answer Comment   Vent Mode AC/VC    Breath rate  22   FiO2 100    FiO2 titrate for Sp02% =/> 90%    PEEP 5    Tidal Volume  700       01/16/19 0033    01/16/19 0008  Ventilator - AC/VC; 100; 90%  Continuous,   Status:  Canceled     Question Answer Comment   Vent Mode AC/VC    FiO2 100    FiO2 titrate for Sp02% =/> 90%        01/16/19 0016             Physician Progress Notes (last 24 hours) (Notes from 1/24/2019  9:58 AM through 1/25/2019  9:58 AM)      Reji Robledo APRN at 1/25/2019  7:21 AM              PULMONARY AND CRITICAL CARE PROGRESS NOTE - [facility]    Patient: Alan Carlin    1960    MR# [id number]    Acct# [id number]  01/25/19   8:23 AM  Referring Provider: Aniceto Ott MD    Chief Complaint: Mechanically ventilated    Interval history: He remains intubated and sedated.  Levophed is infusing.  He had multiple issues overnight with low O2 sats, particularly when the patient is repositioned.  He is currently on 100% FiO2, PEEP of 14 with sats running 86-88% on the monitor.  PO2 on this mornings ABGs was 64.  Transferring the  patient to  or [place] is not an option and the case was discussed with both facilities per Dr. Gutierrez.  Chest x-ray is stable. Chest tube remains in place. His wife is at bedside this morning and has been updated by Dr. Wright. Per nursing and RT, she is considering comfort care options and would not want him to have CPR at this point.    Meds:    acetylcysteine 1.5 mL Nebulization BID - RT   custom medication builder 50 mL Intrapleural BID   enoxaparin 40 mg Subcutaneous Q24H   famotidine 20 mg Intravenous BID   furosemide 20 mg Intravenous Once   ipratropium-albuterol 3 mL Nebulization Q4H - RT   levoFLOXacin 750 mg Intravenous Q24H   meropenem 2 g Intravenous Q8H   polyethylene glycol 17 g Oral Once   sodium chloride 3 mL Intravenous Q12H   sodium polystyrene 15 g Oral Once   vancomycin 1,250 mg Intravenous Q12H       insulin regular infusion 1 unit/mL 1-20 Units/hr Last Rate: 11 Units/hr (01/25/19 0702)   norepinephrine 0.02-0.3 mcg/kg/min Last Rate: 0.02 mcg/kg/min (01/25/19 0023)   propofol 5-50 mcg/kg/min Last Rate: 30 mcg/kg/min (01/25/19 0746)     Review of Systems:   Cannot obtain due to mechanical ventilation.  The patient notably is critically ill and connected to a ventilator.  As such patient cannot communicate and provide any history whatsoever, including any history of present illness or interval history since arrival or review of systems. The interested reviewer may note this fact, as an attempt has been made at collecting and documenting these portions of the patient history, but this information is unobtainable despite attempted review and therefore cannot be documented at this time.     Ventilator Settings:     Vt (Set, L): 0.6 L  Resp Rate (Set): 20  Pressure Support (cm H2O): 0 cm H20  FiO2 (%): 100 %  PEEP/CPAP (cm H2O): 17 cm H20(changed by Dr. Wright)  Minute Ventilation (L/min) (Obs): 14.2 L/min  Resp Rate (Observed) Vent: 33  I:E Ratio (Set): 1:1.30  I:E Ratio (Obs): 1:1.4  PIP Observed  (cm H2O): 38 cm H2O     Physical Exam:  Temp:  [97 °F (36.1 °C)-99.5 °F (37.5 °C)] 99.3 °F (37.4 °C)  Heart Rate:  [107-131] 117  Resp:  [20-24] 21  BP: ()/(42-89) 92/55  FiO2 (%):  [100 %] 100 %    Intake/Output Summary (Last 24 hours) at 1/25/2019 0823  Last data filed at 1/25/2019 0746  Gross per 24 hour   Intake 2877.31 ml   Output 1885 ml   Net 992.31 ml     SpO2 Percentage    01/25/19 0745 01/25/19 0800 01/25/19 0815   SpO2: (!) 87% (!) 87% (!) 88%      Physical Exam:    Constitutional: He appears well-developed and well-nourished. No distress. Sedated and intubated.  Obese.  HENT: ET tube in place, OG with nutrition infusing.  Head: Normocephalic and atraumatic.   Eyes: Pupils are equal, round, and reactive to light. Right eye exhibits no discharge. Left eye exhibits no discharge.   Neck: supple , thick.  Cardiovascular:  Tachycardia.   No murmur heard.  Pulmonary/Chest: He has decreased breath sounds in the left upper field and the left lower field.  bilateral rales.  O2 sats 86-88% on monitor.     left-sided chest tube in place with minimal drainage  Abdominal: Soft. Bowel sounds are normal.  Abdomen is obese.    Musculoskeletal: He exhibits edema, increased to hands. He exhibits no deformity.   Neurological:   Intubated and sedated    Skin: Skin is warm and dry. No rash noted. He is not diaphoretic. No erythema.   Psychiatric: He is sedated.  Nursing note and vitals reviewed.        Results from last 7 days   Lab Units 01/25/19  0729 01/24/19  0632 01/23/19  0308   WBC 10*3/mm3 27.17* 24.26* 17.30*   HEMOGLOBIN g/dL 12.6* 11.7* 11.9*   PLATELETS 10*3/mm3 336 292 334     Results from last 7 days   Lab Units 01/25/19  0729 01/24/19  0632 01/23/19  0308   SODIUM mmol/L 140 141 145   POTASSIUM mmol/L 5.0 4.8 4.4   BUN mg/dL 75* 56* 31*   CREATININE mg/dL 1.09 1.10 0.73     Results from last 7 days   Lab Units 01/25/19  0333 01/24/19  0348 01/23/19  1245   PH, ARTERIAL pH units 7.341* 7.375 7.368   PCO2,  ARTERIAL mm Hg 58.4* 55.0* 59.7*   PO2 ART mm Hg 63.9* 68.0* 85.6   FIO2 % 100 100 100     Blood Culture   Date Value Ref Range Status   01/16/2019 No growth at 24 hours  Preliminary   01/16/2019 No growth at 24 hours  Preliminary     Respiratory Culture   Date Value Ref Range Status   01/15/2019 Rare Normal Respiratory Ana Lilia  Final   01/15/2019 Rare Candida albicans (A)  Final     Recent films:  Xr Chest 1 View    Result Date: 1/25/2019  Impression: 1.   No significant interval change since previous exam. 2.  Extensive consolidative process involving both lungs, right greater than left, overall unchanged.   This report was finalized on 01/25/2019 07:30 by Dr. Shanice Tabor MD.    Xr Chest 1 View    Result Date: 1/24/2019  Impression: 1.   Increasing bilateral confluent opacities, concerning for worsening infection..   This report was finalized on 01/24/2019 15:19 by Dr. Shanice Tabor MD.    Xr Chest 1 View    Result Date: 1/24/2019  Stable one day appearance of the chest. This report was finalized on 01/24/2019 07:49 by Dr. Anoop Brunson MD.    Films reviewed personally by me.  My interpretation: Endotracheal tube in position, persistent bilateral infiltrates    Pulmonary Assessment:    1. Acute hypoxemic respiratory fair requiring mechanical ventilation  2. Empyema, left  3. Type II diabetes  4. Thyroid disease  5. Acute kidney injury  6. Hypotension, with pressor infusing   7. Sepsis  8. Leukocytosis, worsening   9. Obesity     Recommend:     · Peep has been increased to 17, FiO2 remains at 100%.  · Lasix 20 mg IV this morning.  · Day # 10 on vent  · Continue mucomyst BID for thick lung secretions.   · Chest tube management per CT surgery  · Continue nebs scheduled  · Chest x-ray and ABG daily while on the ventilator  · Stress ulcer and DVT prophylaxis  · Antibiotics: Merrem, Vanc and Levaquin  · His wife was updated at bedside this morning. The patient is not a candidate for transfer.     Electronically  signed by LUIS E Huerta on 1/25/2019 at 8:23 AM        Electronically signed by Reji Robledo APRN at 1/25/2019  8:36 AM     Sotero Tejada MD at 1/24/2019  8:25 PM        Discussed case in detail with hospitalyst, patient with pulmonary failure, sats in the 87% range with ventilatory support of FIO2 100% and PEEP of 14. Patient remains hemodynamically stable in no pressors, however, saturations are low with high ventilatory support.    Discussed with [facility] and  and patient is not an ECMO candidate because of already 9 days on the ventilator with worsening pulmonary function. Due to morbid obesity patient is not a lung transplant candidate and ECMO would not be a definite therapy. Patient with poor prognosis, with worsening respiratory function, discussed with hospitalyst and pulmonary service and relayed feedback from attempts to transfer patient to centers with ECMO capability. At this point, no possibility of ECMO salvage, patient to continue medical and ventilatory management by hospitalyst and pulmonary services.    Spent 60 minutes evaluating patient and contacting tertiary care centers for possible transfer for higher level of care.    Electronically signed by Sotero Tejada MD at 1/24/2019  8:39 PM       Consult Notes (last 24 hours) (Notes from 1/24/2019  9:58 AM through 1/25/2019  9:58 AM)     No notes of this type exist for this encounter.

## 2019-01-25 NOTE — PROGRESS NOTES
Continued Stay Note   [facility]     Patient Name: Alan Carlin  MRN: [id number]  Today's Date: 1/25/2019    Admit Date: 1/15/2019    Discharge Plan     Row Name 01/25/19 0913       Plan    Plan  unclear/possible comfort measures?    Plan Comments  Noted that patient is not a candidate for acute care transfer per physician's notes.  Notes indicate spouse is considering possible comfort measures.  Will follow and assist as needed.        Discharge Codes    No documentation.             DARLYN Ashraf

## 2019-01-25 NOTE — PROGRESS NOTES
CC: respiratory failure, empyema    He remains critically ill overnight with no progress.  He desaturated with administration of IP therapy.  Family is considering withdrawal.      Vitals, labs, ventilator setting reviewed.      Physical Exam:    General: Intubated, lightly sedated,   Cardiovascular: Tachycardia. No murmur, rubs, or gallops.    Pulmonary: Coarse mechanical breath sounds.  Diminished on the right.  No wheezing, rubs, or rales.  Abdomen: soft, obese.  No overt peritoneal signs  Extremities: cool, decreased cap refill.    Neurologic:  Sedated,  No following commands  Chest tube in place.  No air leak.        impression  Acute hypoxic respiratory failure  Sepsis  Left empyema  Pneumonia  Morbid obesity  Poorly controlled diabetes mellitus  Poor prognosis    Medical decision-making/recommendations/plan:  Difficult case.  I am assuming care in Dr. Oglesby's absence.  CT scan of the chest is reviewed by me.  He has right pneumonia with left consolidation, left chest tube, and left loculated pleural effusion consistent with empyema.  He is critically ill with overall a poor prognosis.  At this time on review of the case, he certainly is not a candidate for decortication.  He may benefit from drainage of the left upper hemithorax loculated pleural effusion.  I did discuss with radiology, Dr. Almazan, and Dr. Grey as well as his nurse.  An image guided pigtail catheter could be considered for drainage and potentially additional intrapleural TPA/pulmozyme.  Although the procedure in of itself is low risk, given his physiology in extremis, this will be with significant risk.  He has not demonstrated additional organ failure at this time.  A continued aggressive critical care support may be of benefit if his chest can be widely drained.  I discussed at length with his wife, sister, and his daughters as to this consideration.  Multiple questions were answered to the best of my ability.  I encouraged family  conference.  Later, I was informed by Swetha that family has decided to proceed forward with withdrawal of care.    Will be available as needed.     Total time with greater than 50% time spent counseling the family was 30 minutes.

## 2019-01-25 NOTE — PROGRESS NOTES
Discussed case in detail with hospitalyst, patient with pulmonary failure, sats in the 87% range with ventilatory support of FIO2 100% and PEEP of 14. Patient remains hemodynamically stable in no pressors, however, saturations are low with high ventilatory support.    Discussed with [facility] and  and patient is not an ECMO candidate because of already 9 days on the ventilator with worsening pulmonary function. Due to morbid obesity patient is not a lung transplant candidate and ECMO would not be a definite therapy. Patient with poor prognosis, with worsening respiratory function, discussed with hospitalyst and pulmonary service and relayed feedback from attempts to transfer patient to centers with ECMO capability. At this point, no possibility of ECMO salvage, patient to continue medical and ventilatory management by hospitalyst and pulmonary services.    Spent 60 minutes evaluating patient and contacting tertiary care centers for possible transfer for higher level of care.

## 2019-01-25 NOTE — PROGRESS NOTES
[facility] Medicine Services  INPATIENT PROGRESS NOTE    Patient Name: Alan Carlin  Date of Admission: 1/15/2019  Today's Date: 01/25/19  Length of Stay: 10  Primary Care Physician: Francy Dumont MD    Subjective   Chief Complaint: shortness of breath  HPI     Patient seen and examined at bedside.  Family at bedside.  Family requested discussion regarding goals of care.  They would like to make him DNR and are wanting to withdrawal care today.  Family indicates they do not feel the patient would be happy with living this way and would not want to live if his quality of life would not allow him to work and do as he pleases.  They do not think he would do well with long-term rehab.  They would like to focus on comfort and plan to extubate.    Family requested I talk with the patient.  He answer yes/no.  Patient indicated he had no pain.  I indicated to the patient that he was very ill.  He pointed to his chest, and I asked if he felt difficulty breathing for which he nodded yes.  I asked him if he would like to stop treatments including the ventilator and focus on being comfortable noting he would die, he shook his head yes.  I asked him if he understood what I was saying to give me a thumbs up on his right hand which he did.  Family updated.          Review of Systems     ROS limited due to intubation    Yes/No questions answered appropriately.      Patient indicates he has no pain.  Indicates he is short of breath.        All pertinent negatives and positives are as above. All other systems have been reviewed and are negative unless otherwise stated.     Objective    Temp:  [97 °F (36.1 °C)-99.5 °F (37.5 °C)] 99.3 °F (37.4 °C)  Heart Rate:  [107-131] 117  Resp:  [20-24] 21  BP: ()/(42-89) 92/55  FiO2 (%):  [100 %] 100 %  Physical Exam   Constitutional: He is oriented to person, place, and time. Vital signs are normal. He is intubated.   HENT:   Head: Normocephalic and  atraumatic.   Mouth/Throat: Mucous membranes are dry.   Neck: Trachea normal. No JVD present.   Cardiovascular: Regular rhythm, normal heart sounds and intact distal pulses. Tachycardia present.   No murmur heard.  Pulmonary/Chest: Tachypnea noted. He is intubated. He is in respiratory distress.   Abdominal: Soft. Normal appearance and bowel sounds are normal.   Neurological: He is alert and oriented to person, place, and time. No cranial nerve deficit.   Skin: Skin is warm and dry. He is not diaphoretic. No pallor.   Psychiatric: His behavior is normal. His mood appears anxious.         Results Review:  I have reviewed the labs, radiology results, and diagnostic studies.    Laboratory Data:   Results from last 7 days   Lab Units 01/25/19  0729 01/24/19  0632 01/23/19  0308   WBC 10*3/mm3 27.17* 24.26* 17.30*   HEMOGLOBIN g/dL 12.6* 11.7* 11.9*   HEMATOCRIT % 40.7 38.2* 38.8*   PLATELETS 10*3/mm3 336 292 334        Results from last 7 days   Lab Units 01/25/19  0729 01/24/19  0632 01/23/19  0308  01/19/19  0230   SODIUM mmol/L 140 141 145   < > 144   POTASSIUM mmol/L 5.0 4.8 4.4   < > 4.5   CHLORIDE mmol/L 101 102 105   < > 104   CO2 mmol/L 33.0* 32.0* 33.0*   < > 30.0   BUN mg/dL 75* 56* 31*   < > 76*   CREATININE mg/dL 1.09 1.10 0.73   < > 1.07   CALCIUM mg/dL 9.1 8.8 8.9   < > 8.8   BILIRUBIN mg/dL  --   --  0.4  --  0.3   ALK PHOS U/L  --   --  76  --  74   ALT (SGPT) U/L  --   --  32  --  30   AST (SGOT) U/L  --   --  26  --  42   GLUCOSE mg/dL 173* 169* 99   < > 160*    < > = values in this interval not displayed.       Culture Data:   [unfilled]    Radiology Data:   Imaging Results (last 24 hours)     Procedure Component Value Units Date/Time    XR Chest 1 View [[id number]] Collected:  01/25/19 0729     Updated:  01/25/19 0733    Narrative:       Frontal supine radiograph of the chest 1/25/2019 4:32 AM CST     History: Follow-up pneumonia/empyema; A41.9-Sepsis, unspecified  organism; R65.21-Severe sepsis  with septic shock; Z74.09-Other reduced  mobility     Comparison: Chest x-ray dated 1/24/2019      Findings:   Lines and tubes are stable in position. No new opacities or  pneumothoraces are visualized in the chest. The cardiomediastinal  silhouette and pulmonary vascularity are unchanged.       No acute osseous or soft tissue abnormality is noted.        Impression:       Impression:   1.   No significant interval change since previous exam.  2.  Extensive consolidative process involving both lungs, right greater  than left, overall unchanged.        This report was finalized on 01/25/2019 07:30 by Dr. Shanice Tabor MD.    XR Chest 1 View [[id number]] Collected:  01/24/19 1519     Updated:  01/24/19 1523    Narrative:       Frontal supine radiograph of the chest 1/24/2019 2:03 PM CST     History: hypoxemia; A41.9-Sepsis, unspecified organism; R65.21-Severe  sepsis with septic shock; Z74.09-Other reduced mobility     Comparison: Chest x-ray dated 1/24/2019      Findings:   Lines and tubes are stable in position. Increasing bilateral confluent  opacities.. The cardiomediastinal silhouette and pulmonary vascularity  are unchanged.       No acute osseous or soft tissue abnormality is noted.        Impression:       Impression:   1.   Increasing bilateral confluent opacities, concerning for worsening  infection..        This report was finalized on 01/24/2019 15:19 by Dr. Shanice Tabor MD.          I have reviewed the patient's current medications.     Assessment/Plan     Active Hospital Problems    Diagnosis   • PNA (pneumonia)   • Acute respiratory failure with hypercapnia (CMS/HCC)   • Controlled type 2 diabetes mellitus without complication, with long-term current use of insulin (CMS/HCC)       Assessment:      1) Acute hypoxic and acute on chronic hypercapnia respiratory failure due to left-sided pleural effusion and suspected empyema and right lower lobe pneumonia   2) Septic shock due to community-acquired  pneumonia and empyema  3) Acute kidney injury, resolved  4) Morbid obesity   5) Normocytic anemia  6) Suppressed TSH, normal Free T4  7) Type 2 DM complicated by hyperglycemia  8) Hyperkalemia, improved  9) Constipation, resolved   10) Hypernatremia     Plan:  1.  Now on IV Vanco, Levaquin, and Merrem  2.  Was on Levaquin (received 3 days of tx) and Cefepime (received 4 days of tx); then transitioned to IV Zosyn for approx 5 days; then transitioned to IV Merrem and added back Levaquin on 1/23  3.  Appreciate CT Surg and Pulmonary input; case discussed with Dr. Oglesby and Dr. Wright within past 24hrs  4.  Certainly a difficult decision and very, very high risk regarding proceeding to surgery (VATS) in the setting of Fi02 at 100% and PEEP 14.  Pulmozyme and alteplase restarted.  5.  Follow-up cultures; currently negative  6.  Lovenox/Pepcid PPx  7.  Nebs scheduled; trial of Mucomyst nebs started by Pulmonary  8.  Last received Lasix on 1/19; repeat dose of IV Lasix given 1/23  9.  TFs currently at 35ml/hr; minimal residuals per nursing  10.  Insulin gtt per protocol for tight control of BSs  11.  ICU care  12.  Patient is critically ill  13.  Updated spouse at bedside this AM; will start contacting tertiary care medical centers this AM regarding arranging transfer.  Family understands risks of transferring in this high risk setting.    Comfort care initiated with IV fentanyl and extubation.  60 minutes spent in discussion with family at bedside regarding options for care including an additional chest tube and comfort measures.  Family has elected comfort measures.  All noncomfort medications were discontinued.    Case discussed with bedside RN Dr. Sky Posada (CT surgery)                Discharge Planning: I expect the patient to be discharged to pass during this addmission.    Aniceto Ott MD   01/25/19   9:40 AM

## 2019-01-25 NOTE — PROGRESS NOTES
Continued Stay Note   [facility]     Patient Name: Alan Carlin  MRN: [id number]  Today's Date: 2019    Admit Date: 1/15/2019    Discharge Plan     Row Name 19 1419       Plan    Final Discharge Disposition Code  20 -     Final Note  patient         Discharge Codes    No documentation.             DARLYN Ashraf

## 2019-01-25 NOTE — THERAPY DISCHARGE NOTE
Acute Care - Physical Therapy Progress Note/Discharge   [place]     Patient Name: Alan Carlin  : 1960  MRN: [id number]  Today's Date: 2019  Onset of Illness/Injury or Date of Surgery: 01/15/19  Date of Referral to PT: 19  Referring Physician: Dr. Ott(ROM)    Admit Date: 1/15/2019    Visit Dx:    ICD-10-CM ICD-9-CM   1. Shock, septic (CMS/Formerly [facility]) A41.9 038.9    R65.21 785.52     995.92   2. Impaired mobility Z74.09 799.89     Patient Active Problem List   Diagnosis   • Controlled type 2 diabetes mellitus without complication, with long-term current use of insulin (CMS/[facility]ly [facility])   • Hyperlipidemia   • Vitamin D deficiency   • Essential hypertension   • Upper respiratory tract infection   • PNA (pneumonia)   • Acute respiratory failure with hypercapnia (CMS/Formerly [facility])       Physical Therapy Education     Title: PT OT SLP Therapies (Resolved)     Topic: Physical Therapy (Resolved)     Point: Mobility training (Resolved)     Learning Progress Summary           Significant Other Acceptance, E, VU by RUPALI at 2019 10:12 AM    Comment:  Benefits of ROM                   Point: Home exercise program (Resolved)     Learning Progress Summary           Patient Acceptance, E, VU,NR by ROMULO at 2019 11:00 AM    Comment:  Educated pt/family x 2 on progression of PT POC and benefits of activity   Family Acceptance, E, VU,NR by ROMULO at 2019 11:00 AM    Comment:  Educated pt/family x 2 on progression of PT POC and benefits of activity                               User Key     Initials Effective Dates Name Provider Type Discipline    ROMULO 16 -  Juan Ramon Damon, PT DPT Physical Therapist PT    RUPALI 16 -  Karen Ernst PTA Physical Therapy Assistant PT              Rehab Goal Summary     Row Name 19 1400             Bed Mobility Goal 1 (PT)    [place] Level/Cues Needed (Bed Mobility Goal 1, PT)  -- goal: mod x2. Pt. was dependent  -RUPALI      Progress/Outcomes (Bed Mobility Goal 1, PT)  goal  not met  -RUPALI         ROM Goal 1 (PT)    ROM Goal 1 (PT)  -- goal: AA_A. Prom  -RUPALI      Progress/Outcome (ROM Goal 1, PT)  goal not met  -RUPALI         Patient Education Goal (PT)    [place]/Cues/Accuracy (Memory Goal 2, PT)  -- goal: adequately. Pt. was on vent  -RUPALI      Progress/Outcome (Patient Education Goal, PT)  medical status inhibiting progress;goal met;goal not met  -RUPALI        User Key  (r) = Recorded By, (t) = Taken By, (c) = Cosigned By    Initials Name Provider Type Discipline    Karen Morris PTA Physical Therapy Assistant PT        Therapy Treatment  Rehabilitation Treatment Summary     Row Name 01/25/19 0936             Treatment Time/Intention    Discipline  physical therapy assistant  -RUPALI      Comment  considering comfort measures  -RUPALI      Reason Treatment Not Performed  other (see comments)  -RUPALI      Recorded by [RUPALI] Kraen Ernst PTA 01/25/19 0936      Row Name                Wound 01/23/19 0700 Bilateral coccyx other (see comments)    Wound - Properties Group Date first assessed: 01/23/19 [KM] Time first assessed: 0700 [KM] Present On Admission : no [KM] Side: Bilateral [KM] Location: coccyx [KM] Type: other (see comments) [KM] Stage, Pressure Injury: other (see comments) [KM] Additional Comments: coccyx deep red/purple, blanchable. Covered w/white cream. [KM] Recorded by:  [KM] Taniya Crain RN 01/23/19 0805      User Key  (r) = Recorded By, (t) = Taken By, (c) = Cosigned By    Initials Name Effective Dates Discipline    RUPALI Karen Ernst PTA 08/02/16 -  PT    KM Taniya Crain RN 08/02/16 -  Nurse        Wound 01/23/19 0700 Bilateral coccyx other (see comments) (Active)   Dressing Appearance open to air 1/25/2019  4:00 AM   Closure Open to air 1/25/2019  4:00 AM   Base other (see comments) 1/25/2019  4:00 AM   Periwound dry 1/25/2019  4:00 AM   Periwound Temperature warm 1/25/2019  4:00 AM   Periwound Skin Turgor soft 1/25/2019  4:00 AM       PT Recommendation and  Plan  Anticipated Discharge Disposition (PT): other (see comments)()  Outcome Summary/Treatment Plan (PT)  Anticipated Discharge Disposition (PT): other (see comments)()  Plan of Care Reviewed With: patient  Outcome Summary: Pt. tolerates and will continue to benefit from PROM.         Time Calculation:     Therapy Suggested Charges     Code   Minutes Charges    15330 (CPT®) Hc Pt Neuromusc Re Education Ea 15 Min      51130 (CPT®) Hc Pt Ther Proc Ea 15 Min 16 1    22043 (CPT®) Hc Gait Training Ea 15 Min      13828 (CPT®) Hc Pt Therapeutic Act Ea 15 Min      06059 (CPT®) Hc Pt Manual Therapy Ea 15 Min      91914 (CPT®) Hc Pt Iontophoresis Ea 15 Min      39280 (CPT®) Hc Pt Elec Stim Ea-Per 15 Min      44126 (CPT®) Hc Pt Ultrasound Ea 15 Min      36231 (CPT®) Hc Pt Self Care/Mgmt/Train Ea 15 Min      77913 (CPT®) Hc Pt Prosthetic (S) Train Initial Encounter, Each 15 Min      00040 (CPT®) Hc Pt Orthotic(S)/Prosthetic(S) Encounter, Each 15 Min      40105 (CPT®) Hc Orthotic(S) Mgmt/Train Initial Encounter, Each 15min      Total  16 1          Therapy Charges for Today     Code Description Service Date Service Provider Modifiers Qty    10444220569 HC PT THER PROC EA 15 MIN 2019 Karen Ernst, PTA GP 1          PT G-Codes  Outcome Measure Options: AM-PAC 6 Clicks Basic Mobility (PT)  AM-PAC 6 Clicks Score: 6    PT Discharge Summary  Anticipated Discharge Disposition (PT): other (see comments)()  Reason for Discharge: Patient   Outcomes Achieved: Other()  Discharge Destination: other (comment)()    Karen Ernst, PTA  2019

## 2019-01-28 NOTE — PAYOR COMM NOTE
" 19  [id number]    Alan Wheeler (Dcsd. Male)     Date of Birth Social Security Number Address Home Phone MRN    1960  [id number] [id number] [phone] [id number]    Hindu Marital Status          Latter day        Admission Date Admission Type Admitting Provider Attending Provider Department, Room/Bed    1/15/19 Urgent Aniceto Ott MD  [facility] INTENSIVE CARE, I012/    Discharge Date Discharge Disposition Discharge Destination        2019               Attending Provider:  (none)   Allergies:  No Known Allergies    Isolation:  None   Infection:  None   Code Status:  Prior    Ht:  175.3 cm (69\")   Wt:  156 kg (343 lb 0.6 oz)    Admission Cmt:  None   Principal Problem:  None                Active Insurance as of 1/15/2019     Primary Coverage     Payor Plan Insurance Group Employer/Plan Group    [employer] BLUE [employer]OSS [employer] BLUE CROSS BLUE [employer]IELD PPO [id number]     Payor Plan Address Payor Plan Phone Number Payor Plan Fax Number Effective Dates    [address] [phone]  2010 - None Entered    [facility] [id number]       Subscriber Name Subscriber Birth Date Member ID       FOUZIA WHEELER 1962 [id number]                 Emergency Contacts      (Rel.) Home Phone Work Phone Mobile Phone    Fouzia Wheeler (Spouse) [phone] --               Discharge Summary      Aniceto Ott MD at 2019  2:55 PM              [facility] Services  DISCHARGE SUMMARY       Date of Admission: 1/15/2019  Date of Discharge:  2019  Primary Care Physician: Francy Dumont MD    Presenting Problem/History of Present Illness:  PNA (pneumonia) [J18.9]     Final Discharge Diagnoses:  Active Hospital Problems    Diagnosis   • PNA (pneumonia)   • Acute respiratory failure with hypercapnia (CMS/HCC)   • Controlled type 2 diabetes mellitus without complication, " with long-term current use of insulin (CMS/[facility])     DISCHARGE DIAGNOSES:  1) Acute hypoxemic respiratory failure due to left-sided pleural effusion and suspected empyema and right lower lobe pneumonia; requiring mechanical ventilation  2) Septic shock due to community-acquired pneumonia and empyema  3) Acute kidney injury, improved  4) Morbid obesity   5) Normocytic anemia  6) Suppressed TSH, normal Free T4  7) Type 2 DM complicated by hyperglycemia  8) Hyperkalemia, improved  9) Constipation, resolved   10) Hypernatremia  11) Obstructive sleep apnea     Consults:   1.  Cardiothoracic Surgery  2.  Pulmonary Medicine    Procedures Performed:   Imaging Results (all)     Procedure Component Value Units Date/Time    XR Chest 1 View [[id number]] Collected:  01/24/19 0748     Updated:  01/24/19 0752    Narrative:       EXAMINATION: XR CHEST 1 VW- 1/24/2019 7:48 AM CST     HISTORY: Chest tube, intubated     COMPARISON: 1/23/2019     FINDINGS:  Endotracheal tube is in place with tip above the cesar. Enteric tube is  in place with tip below the diaphragm and out of the field-of-view. A  chest tube is in place with tip directed at the medial left lung base.  Diffuse airspace opacities are again seen bilaterally, similar in  appearance to the previous exam. No definite pneumothorax is identified  on this exam. There is loculated pleural fluid in the upper left lung  field.       Impression:       Stable one day appearance of the chest.  This report was finalized on 01/24/2019 07:49 by Dr. Anoop Brunson MD.    XR Chest 1 View [[id number]] Collected:  01/23/19 0734     Updated:  01/23/19 0738    Narrative:       Frontal supine radiograph of the chest 1/23/2019 4:32 AM CST     History: intubated; A41.9-Sepsis, unspecified organism; R65.21-Severe  sepsis with septic shock; Z74.09-Other reduced mobility     Comparison: Chest x-ray dated 1/22/2019      Findings:   Lines and tubes are stable in position. Bilateral parenchymal  opacities  have increased.. The cardiomediastinal silhouette and pulmonary  vascularity are unchanged.       No acute osseous or soft tissue abnormality is noted.        Impression:       Impression:   1.   Increasing bilateral parenchymal opacities, concerning for  worsening findings of infection..        This report was finalized on 01/23/2019 07:35 by Dr. Shanice Tabor MD.    CT Chest Without Contrast [[id number]] Collected:  01/22/19 0745     Updated:  01/22/19 0756    Narrative:       CT CHEST WO CONTRAST- 1/22/2019 6:02 AM CST     HISTORY: Pleural effusion; A41.9-Sepsis, unspecified organism;  R65.21-Severe sepsis with septic shock; Z74.09-Other reduced mobility     COMPARISON: Multiple prior chest x-rays, the most recent which is dated  1/22/2019     DOSE LENGTH PRODUCT: 538 mGy cm. Automated exposure control was also  utilized to decrease patient radiation dose.     TECHNIQUE: Serial helical tomographic images of the chest were acquired.  Multiplanar reformatted images were provided for review.     FINDINGS:  The imaged portion of the neck and thyroid gland is unremarkable.      A left-sided pleural drain is identified in the left lung base. There is  essentially complete collapse of the left lower lobe with partial left  upper lobe collapse. There is a multiloculated effusion in the left  pleural space, the largest component of which is seen along the  posterior costal pleura in the left apex. Dependent peripheral  consolidations are seen in the right lower lobe and right upper lobe as  well and there is a small inflammatory nodularity throughout the right  upper, middle and lower lobes. No right-sided pleural effusion. There is  an endotracheal tube which appears in good position. Airways are  otherwise clear.     The heart is normal in size. Thoracic aorta is normal in caliber.  Central pulmonary arteries are mildly dilated, with the main pulmonary  artery measuring up to 34 mm in greatest axial  diameter. No pericardial  effusion. There is moderate coronary artery atheromatous calcification.  No enlarged axillary or mediastinal lymph nodes are present.      No acute findings are seen in the bones or surrounding soft tissues.     No acute findings are seen in the visualized portion of the upper  abdomen. Cholelithiasis. Enteric tube is in good position.       Impression:       1. Complete left lower lobe collapse with partial left upper lobe  collapse, in part due to the adjacent multiloculated left pleural  effusion/empyema. A left chest tube is seen in the left lung base, with  the largest remaining component of the effusion/empyema now seen at the  left apex.  2. Dense consolidation in the dependent portions of the right upper and  right lower lobes with additional inflammatory nodularity throughout all  3 lobes on the right. The inflammatory nodularity in particular raises  suspicion for a developing infectious process in right lung.  3. Endotracheal tube and enteric tube appear in good position.  This report was finalized on 01/22/2019 07:53 by Dr Handy Avery, .    XR Chest 1 View [[id number]] Collected:  01/22/19 0740     Updated:  01/22/19 0753    Narrative:       XR CHEST 1 VW- 1/22/2019 3:25 AM CST     HISTORY: chest tube, vent; A41.9-Sepsis, unspecified organism;  R65.21-Severe sepsis with septic shock; Z74.09-Other reduced mobility       COMPARISON: 1/21/2019.     FINDINGS:      Endotracheal tube is in good position. There is a left chest tube in the  left lung base, paralleling the left hemidiaphragm. There continues to  be opacification of the left lung base with obscuration of the left  hemidiaphragm and a fluid meniscus along the left chest wall. There is  dense opacification also of the left apex though it appears to be a  loculated effusion at the apex. There is increasing consolidation  throughout the right lung is well, affecting both the upper and lower  lung fields. There may be a trace  effusion in the pleural space at the  right apex. An enteric tube is identified, the tip being difficult to  see on this exam.     The osseous structures and surrounding soft tissues demonstrate no acute  abnormality.       Impression:       1. Diffuse increased consolidation throughout the right lung from the  prior exams, suspicious for developing infectious infiltrate, edema or  perhaps atelectasis.  2. Appearance of the left lung is stable.  This report was finalized on 01/22/2019 07:44 by Dr Handy Avery, .    XR Chest 1 View [[id number]] Collected:  01/21/19 0724     Updated:  01/21/19 0728    Narrative:       Frontal supine radiograph of the chest 1/21/2019 4:45 AM CST     History: chest tube; A41.9-Sepsis, unspecified organism; R65.21-Severe  sepsis with septic shock; Z74.09-Other reduced mobility     Comparison: Chest x-ray dated 1/20/2019.      Findings:   Lines and tubes are stable in position. No new opacities or  pneumothoraces are visualized in the chest. The cardiomediastinal  silhouette and pulmonary vascularity are unchanged.       No acute osseous or soft tissue abnormality is noted.        Impression:       Impression:   1.   No significant interval change since previous exam.        This report was finalized on 01/21/2019 07:25 by Dr. Shanice Tabor MD.    XR Chest 1 View [[id number]] Collected:  01/20/19 0835     Updated:  01/20/19 0841    Narrative:       EXAM: XR CHEST 1 VW- - 1/20/2019 8:32 AM CST     HISTORY: ET tube, chest tubes; A41.9-Sepsis, unspecified organism;  R65.21-Severe sepsis with septic shock; Z74.09-Other reduced mobility       COMPARISON: 1/19/2019.      TECHNIQUE:  1 images.  Frontal view of the chest.     FINDINGS:    Endotracheal tube tip projects over the mid trachea. Gastric tube tip  outside the field of view. Similar left chest tube at the inferior  chest.     Opacifications at the left upper and left lower lung as well as right  midlung. Large left pleural effusion. No  pneumothorax. Cardiac and  mediastinal silhouette partially obscured by pulmonary opacities.  Cardiac silhouette appears prominent. No acute bony finding.          Impression:       1. Similar left greater than right opacities. Large left pleural  effusion. Left chest tube.  This report was finalized on 01/20/2019 08:38 by Dr Kelli Weiner MD.    XR Chest 1 View [[id number]] Collected:  01/19/19 0616     Updated:  01/19/19 0622    Narrative:       EXAM: XR CHEST 1 VW- - 1/19/2019 3:05 AM CST     HISTORY: Intubated Patient; A41.9-Sepsis, unspecified organism;  R65.21-Severe sepsis with septic shock; Z74.09-Other reduced mobility       COMPARISON: 1/18/2019, 1/17/2019.      TECHNIQUE:  1 images.  Frontal view of the chest.     FINDINGS:    Endotracheal tube tip projects over the mid trachea. Feeding tube tip  outside field of view. Left chest tube in similar position compared to  prior.     No right pleural effusion. Right mid lung opacity similar to prior. No  definite left pneumothorax. Similar left basilar consolidation and  effusion.     Similar enlarged cardiac silhouette. Calcified aortic atherosclerosis.          Impression:       1. Similar left basilar consolidation and effusion. Similar right  midlung opacity.  2. Similar prominent cardiac silhouette.  3. Lines and tubes as above.  This report was finalized on 01/19/2019 06:19 by Dr Kelli Weiner MD.    XR Abdomen KUB [[id number]] Collected:  01/18/19 1130     Updated:  01/18/19 1135    Narrative:       EXAMINATION:   XR ABDOMEN KUB-  1/18/2019 11:30 AM CST     HISTORY: Nasogastric tube     Single view the abdomen is obtained. Nasogastric tube is present distal  tip in the body the stomach.     IMPRESSION satisfactory placement nasogastric tube.  2. Left chest tube is also noted  This report was finalized on 01/18/2019 11:32 by Dr. Jerrod Nunez MD.    XR Chest 1 View [[id number]] Collected:  01/18/19 0817     Updated:  01/18/19 0723    Narrative:        History:  58-year-old with intubation.     Reference:  Chest radiograph one day prior.     Findings:  Frontal chest radiograph performed.     Moderate partially loculated left pleural effusion is unchanged from  yesterday. There is some aerated left upper lobe, although majority left  lung remains compressed. Mild consolidation right midlung is unchanged.  No pneumothorax.     Endotracheal tube is well-positioned and stable. Enteric tube descends  below diaphragm, tip not imaged. Large bore chest tube in the left base  is unchanged.          Impression:       No change. Left basilar chest tube is unchanged.  This report was finalized on [id number] by Dr Jean-Pierre Solano, .    XR Chest 1 View [[id number]] Collected:  01/17/19 0724     Updated:  01/17/19 0729    Narrative:       History:  58-year-old with intubation.     Reference:  Chest radiograph one day prior.     Findings:  Frontal chest radiograph performed.     Cardiomegaly. Large left pleural effusion with loculated component.  Majority the left lung is consolidated/compressed.: Minimal aeration of  the left upper lobe. There remains consolidation in the superior segment  right lower lobe. No right pleural effusion. No pneumothorax.     Well-positioned endotracheal tube. NG tube is not well seen distally,  technical related.          Impression:       No interval improvement.  This report was finalized on 01/17/2019 07:26 by Dr Jean-Pierre Solano, .    XR Abdomen KUB [[id number]] Collected:  01/16/19 1036     Updated:  01/16/19 1057    Narrative:       EXAMINATION:   XR ABDOMEN KUB-  1/16/2019 10:36 AM CST     HISTORY: NG tube placement     Single view the abdomen is obtained. Nasogastric tube is present with  the distal tip satisfactorily positioned in the body the stomach.       Impression:       Satisfactory placement nasogastric tube  This report was finalized on 01/16/2019 10:37 by Dr. Jerrod Nunez MD.    XR Chest 1 View [[id number]] Collected:  01/16/19 0910      Updated:  01/16/19 0943    Narrative:       EXAMINATION:   XR CHEST 1 VW-  1/16/2019 9:37 AM CST     HISTORY: Chest tube placement     Frontal upright radiograph of the chest 1/16/2019 8:04 AM CST     COMPARISON: January 16, 2019 3:00 AM.     FINDINGS:   The right lung appears clear. Large left pleural complex is present.  Left chest tube is present left lung base.. The cardiac silhouettes  enlarged. Endotracheal tube is present..      The osseous structures and surrounding soft tissues demonstrate no acute  abnormality.       Impression:       1. Left chest tube is present projecting over the left lung base.  Moderate to large left pleural complex is present.  2. Endotracheal tube nasogastric tube are satisfactorily position.        This report was finalized on 01/16/2019 09:40 by Dr. Jerrod Nunez MD.    CT Chest Without Contrast [[id number]] Collected:  01/16/19 0725     Updated:  01/16/19 0735    Narrative:       EXAMINATION:   CT CHEST WO CONTRAST-  1/16/2019 7:25 AM CST     CT CHEST WO CONTRAST- 1/15/2019 10:47 PM CST     HISTORY: Pneumonia complicated / unresolved     COMPARISON: None     DOSE LENGTH PRODUCT: 1112 mGy cm. Automated exposure control was also  utilized to decrease patient radiation dose.     TECHNIQUE: Serial helical tomographic images of the chest were acquired.  Multiplanar reformatted images were provided for review.     FINDINGS:  The imaged portion of the neck and thyroid gland is unremarkable.      Infiltrates present in the right lower lobe. Opacification of the left  hemithorax. There is consolidation of the left lung and a moderate to  large left pleural complex. Only a small segment of the left upper lobe  is aerated.. The left pleural complex is large pleural complex. Is may  be causing compression atelectasis of the consolidated left lung.  Tracheal tube is present. Nasogastric tube is satisfactorily position..     The heart, great vessels, and pulmonary vessels are normal in  appearance  within limits of a noncontrast study. There is no pericardial effusion.  No enlarged axillary or mediastinal lymph nodes are present.      No acute findings are seen in the bones or surrounding soft tissues.     Calculi are noted in the gallbladder.       Impression:       1. Large left pleural complex. There is consolidation of the left lung.  Only a small segment of the left upper lobe is aerated.  2. Small infiltrate right lower lobe        This report was finalized on 01/16/2019 07:32 by Dr. Jerrod Nunez MD.    XR Chest 1 View [[id number]] Collected:  01/16/19 0700     Updated:  01/16/19 0705    Narrative:       History:  58-year-old to confirm endotracheal tube placement.     Reference:  CT chest 1 day prior.     Findings:  Frontal chest radiograph performed.     There remains consolidation in the right lower lobe. Large left pleural  effusion compressing the majority the left lung. Alternatively this  could be extensive left lung pneumonia with parapneumonic fluid.  Atherosclerotic calcifications. No pneumothorax. Enteric tube extends  below diaphragm, tip not imaged. Endotracheal tube is in good position  at the level of the sternoclavicular joints.          Impression:       Satisfactory endotracheal tube.  This report was finalized on 01/16/2019 07:02 by Dr Jean-Pierre Solano, .    XR Chest 1 View [[id number]] Collected:  01/15/19 2003     Updated:  01/15/19 2008    Narrative:       EXAMINATION: Chest 1 view 1/15/2019     HISTORY: Respiratory failure. Mechanical ventilation     FINDINGS: Endotracheal tube projects at the T3 level. There is volume  loss on the left with mediastinal shift to the left. There is  consolidation within the medial aspect of the left upper lobe as well as  consolidation of the left lower lobe with silhouetting of the left  hemidiaphragm. It is difficult to ascertain whether this represents  atelectasis related to mucoid impaction versus a neoplastic process. I  would suggest  follow-up with an enhanced CT of the chest for further  assessment. There is also a nodular opacity within the right midlung  zone which could be infectious in nature but which could also be  assessed at the time of CT imaging. The right lung is otherwise clear.       Impression:       1.. Consolidation and volume loss within both the left upper and left  lower lobe with silhouetting of the left diaphragm, heart and left  mediastinum. It is difficult to ascertain whether this represents mucoid  impaction with partial collapse of segments of the left lung versus a  neoplastic process. CT imaging with IV contrast is recommended.  2. Nodular opacity within the right midlung zone either related to  ongoing infiltrate versus a neoplastic process.  3. Endotracheal tube well-positioned.  This report was finalized on 01/15/2019 20:05 by Dr. Rudy Ahmadi MD.          Pertinent Test Results:   Lab Results (last 48 hours)     Procedure Component Value Units Date/Time    POC Glucose Once [[id number]]  (Normal) Collected:  01/24/19 1443    Specimen:  Blood Updated:  01/24/19 1454     Glucose 112 mg/dL      Comment: : [id number] Paras Taniya  AMeter ID: [id number]       POC Glucose Once [[id number]]  (Abnormal) Collected:  01/24/19 1156    Specimen:  Blood Updated:  01/24/19 1210     Glucose 149 mg/dL      Comment: : [id number] Paras Taniya  AMeter ID: [id number]       POC Glucose Once [[id number]]  (Normal) Collected:  01/24/19 1012    Specimen:  Blood Updated:  01/24/19 1024     Glucose 129 mg/dL      Comment: : [id number] Paras Taniya  AMeter ID: [id number]       POC Glucose Once [[id number]]  (Abnormal) Collected:  01/24/19 0738    Specimen:  Blood Updated:  01/24/19 0752     Glucose 144 mg/dL      Comment: : [id number] Paras Taniya  AMeter ID: [id number]       Basic Metabolic Panel [[id number]]  (Abnormal) Collected:  01/24/19 0632    Specimen:  Blood Updated:  01/24/19 0713     Glucose 169 mg/dL      BUN  56 mg/dL      Creatinine 1.10 mg/dL      Sodium 141 mmol/L      Potassium 4.8 mmol/L      Chloride 102 mmol/L      CO2 32.0 mmol/L      Calcium 8.8 mg/dL      eGFR Non African Amer 69 mL/min/1.73      BUN/Creatinine Ratio 50.9     Anion Gap 7.0 mmol/L     Narrative:       GFR Normal >60  Chronic Kidney Disease <60  Kidney Failure <15    Vancomycin, Trough [[id number]]  (Normal) Collected:  01/24/19 0632    Specimen:  Blood Updated:  01/24/19 0705     Vancomycin Trough 14.47 mcg/mL     POC Glucose Once [[id number]]  (Abnormal) Collected:  01/24/19 0624    Specimen:  Blood Updated:  01/24/19 0645     Glucose 148 mg/dL      Comment: : [id number] Sergey  DANIELLAstewart ID: [id number]       CBC (No Diff) [[id number]]  (Abnormal) Collected:  01/24/19 0632    Specimen:  Blood Updated:  01/24/19 0643     WBC 24.26 10*3/mm3      RBC 4.33 10*6/mm3      Hemoglobin 11.7 g/dL      Hematocrit 38.2 %      MCV 88.2 fL      MCH 27.0 pg      MCHC 30.6 g/dL      RDW 14.8 %      RDW-SD 48.2 fl      MPV 9.9 fL      Platelets 292 10*3/mm3     POC Glucose Once [[id number]]  (Abnormal) Collected:  01/24/19 0517    Specimen:  Blood Updated:  01/24/19 0535     Glucose 154 mg/dL      Comment: : [id number] Gilmer Boone CMeter ID: [id number]       POC Glucose Once [[id number]]  (Abnormal) Collected:  01/24/19 0411    Specimen:  Blood Updated:  01/24/19 0433     Glucose 149 mg/dL      Comment: : [id number] Gilmer Boone CMeter ID: [id number]       Blood Gas, Arterial [[id number]]  (Abnormal) Collected:  01/24/19 0348    Specimen:  Arterial Blood Updated:  01/24/19 0357     Site Right Brachial     Devon's Test N/A     pH, Arterial 7.375 pH units      pCO2, Arterial 55.0 mm Hg      Comment: 83 Value above reference range        pO2, Arterial 68.0 mm Hg      Comment: 84 Value below reference range        HCO3, Arterial 32.2 mmol/L      Comment: 83 Value above reference range        Base Excess, Arterial 5.6 mmol/L      Comment: 83 Value above  reference range        O2 Saturation, Arterial 92.0 %      Comment: 84 Value below reference range        Temperature 37.0 C      Barometric Pressure for Blood Gas 750 mmHg      Modality Ventilator     FIO2 100 %      Ventilator Mode AC     Set Tidal Volume 600     Set Mech Resp Rate 20.0     PEEP 14.0     Collected by [id number]     Comment: Meter: [id number]     :  [id number]       POC Glucose Once [[id number]]  (Abnormal) Collected:  01/24/19 0301    Specimen:  Blood Updated:  01/24/19 0322     Glucose 190 mg/dL      Comment: : [id number] Gilmer Boone CMeter ID: [id number]       POC Glucose Once [[id number]]  (Abnormal) Collected:  01/24/19 0221    Specimen:  Blood Updated:  01/24/19 0243     Glucose 181 mg/dL      Comment: : [id number] Gilmer Boone CMeter ID: [id number]       POC Glucose Once [[id number]]  (Abnormal) Collected:  01/24/19 0022    Specimen:  Blood Updated:  01/24/19 0138     Glucose 187 mg/dL      Comment: : [id number] Gilmer Boone CMeter ID: [id number]       POC Glucose Once [[id number]]  (Abnormal) Collected:  01/23/19 2129    Specimen:  Blood Updated:  01/23/19 2151     Glucose 153 mg/dL      Comment: : [id number] Gilmer Mely CMeter ID: [id number]       POC Glucose Once [[id number]]  (Abnormal) Collected:  01/23/19 2000    Specimen:  Blood Updated:  01/23/19 2021     Glucose 166 mg/dL      Comment: : [id number] Gilmer Boone CMeter ID: [id number]       POC Glucose Once [[id number]]  (Abnormal) Collected:  01/23/19 1806    Specimen:  Blood Updated:  01/23/19 1817     Glucose 157 mg/dL      Comment: : [id number] Paras Taniya  AMeter ID: [id number]       POC Glucose Once [[id number]]  (Abnormal) Collected:  01/23/19 1657    Specimen:  Blood Updated:  01/23/19 1708     Glucose 146 mg/dL      Comment: : [id number] Paras Loydly  AMeter ID: [id number]       POC Glucose Once [[id number]]  (Abnormal) Collected:  01/23/19 1557    Specimen:  Blood Updated:  01/23/19 1618      Glucose 185 mg/dL      Comment: : [id number] Paras Monahan  AMeter ID: [id number]       POC Glucose Once [[id number]]  (Abnormal) Collected:  01/23/19 1442    Specimen:  Blood Updated:  01/23/19 1453     Glucose 168 mg/dL      Comment: : [id number] Paras Monahan  AMeter ID: [id number]       POC Glucose Once [[id number]]  (Abnormal) Collected:  01/23/19 1347    Specimen:  Blood Updated:  01/23/19 1358     Glucose 171 mg/dL      Comment: : [id number] Paras Monahan  AMeter ID: [id number]       Blood Gas, Arterial [[id number]]  (Abnormal) Collected:  01/23/19 1245    Specimen:  Arterial Blood Updated:  01/23/19 1251     Site Left Radial     Devon's Test Positive     pH, Arterial 7.368 pH units      pCO2, Arterial 59.7 mm Hg      Comment: 83 Value above reference range        pO2, Arterial 85.6 mm Hg      HCO3, Arterial 34.3 mmol/L      Comment: 83 Value above reference range        Base Excess, Arterial 7.2 mmol/L      Comment: 83 Value above reference range        O2 Saturation, Arterial 95.2 %      Temperature 37.0 C      Barometric Pressure for Blood Gas 746 mmHg      Modality Ventilator     FIO2 100 %      Ventilator Mode AC     Set Tidal Volume 600     Set Mech Resp Rate 18.0     PEEP 14.0     Collected by [id number]     Comment: Meter: [id number]     :  [id number]       POC Glucose Once [[id number]]  (Abnormal) Collected:  01/23/19 1235    Specimen:  Blood Updated:  01/23/19 1245     Glucose 168 mg/dL      Comment: : [id number] Paras Monahan  AMeter ID: [id number]       POC Glucose Once [[id number]]  (Abnormal) Collected:  01/23/19 1123    Specimen:  Blood Updated:  01/23/19 1135     Glucose 182 mg/dL      Comment: : [id number] Paras Monahan  AMeter ID: [id number]       Blood Gas, Arterial [[id number]]  (Abnormal) Collected:  01/23/19 1130    Specimen:  Arterial Blood Updated:  01/23/19 1134     Site Right Radial     Devon's Test Positive     pH, Arterial 7.403 pH units      pCO2,  Arterial 55.2 mm Hg      Comment: 83 Value above reference range        pO2, Arterial 62.0 mm Hg      Comment: 84 Value below reference range        HCO3, Arterial 34.4 mmol/L      Comment: 83 Value above reference range        Base Excess, Arterial 8.0 mmol/L      Comment: 83 Value above reference range        O2 Saturation, Arterial 89.7 %      Comment: 84 Value below reference range        Temperature 37.0 C      Barometric Pressure for Blood Gas 746 mmHg      Modality Ventilator     FIO2 100 %      Ventilator Mode PC     Set Mech Resp Rate 18.0     PEEP 14.0     PIP 18 cmH2O      Comment: Meter: [id number]     :  [id number]        Collected by [id number]    Fungus Culture - Body Fluid, Pleural Cavity [[id number]] Collected:  01/16/19 0937    Specimen:  Body Fluid from Pleural Cavity Updated:  01/23/19 1001     Fungus Culture No fungus isolated at 1 week    POC Glucose Once [[id number]]  (Abnormal) Collected:  01/23/19 0813    Specimen:  Blood Updated:  01/23/19 0824     Glucose 135 mg/dL      Comment: : [id number] Paras Monahan  AMeter ID: [id number]       POC Glucose Once [[id number]]  (Normal) Collected:  01/23/19 0559    Specimen:  Blood Updated:  01/23/19 0611     Glucose 128 mg/dL      Comment: : [id number] Pratik AmandaMeter ID: [id number]       Blood Gas, Arterial [[id number]]  (Abnormal) Collected:  01/23/19 0440    Specimen:  Arterial Blood Updated:  01/23/19 0456     Site Right Radial     Devon's Test Positive     pH, Arterial 7.432 pH units      pCO2, Arterial 50.2 mm Hg      Comment: 83 Value above reference range        pO2, Arterial 75.7 mm Hg      Comment: 84 Value below reference range        HCO3, Arterial 33.4 mmol/L      Comment: 83 Value above reference range        Base Excess, Arterial 7.8 mmol/L      Comment: 83 Value above reference range        O2 Saturation, Arterial 94.4 %      Temperature 37.0 C      Barometric Pressure for Blood Gas 745 mmHg      Modality Ventilator      FIO2 100 %      Ventilator Mode PC     Set [facility] Resp Rate 18.0     PEEP 12.5     PIP 18 cmH2O      Comment: Meter: [id number]     :  [id number]        Collected by [id number]    POC Glucose Once [[id number]]  (Normal) Collected:  01/23/19 0352    Specimen:  Blood Updated:  01/23/19 0403     Glucose 102 mg/dL      Comment: : [id number] Chaparro Vallejo ID: [id number]       Comprehensive Metabolic Panel [[id number]]  (Abnormal) Collected:  01/23/19 0308    Specimen:  Blood Updated:  01/23/19 0350     Glucose 99 mg/dL      BUN 31 mg/dL      Creatinine 0.73 mg/dL      Sodium 145 mmol/L      Potassium 4.4 mmol/L      Chloride 105 mmol/L      CO2 33.0 mmol/L      Calcium 8.9 mg/dL      Total Protein 6.2 g/dL      Albumin 2.60 g/dL      ALT (SGPT) 32 U/L      AST (SGOT) 26 U/L      Alkaline Phosphatase 76 U/L      Total Bilirubin 0.4 mg/dL      eGFR Non African Amer 110 mL/min/1.73      Globulin 3.6 gm/dL      A/G Ratio 0.7 g/dL      BUN/Creatinine Ratio 42.5     Anion Gap 7.0 mmol/L     CBC (No Diff) [[id number]]  (Abnormal) Collected:  01/23/19 0308    Specimen:  Blood Updated:  01/23/19 0326     WBC 17.30 10*3/mm3      RBC 4.48 10*6/mm3      Hemoglobin 11.9 g/dL      Hematocrit 38.8 %      MCV 86.6 fL      MCH 26.6 pg      MCHC 30.7 g/dL      RDW 14.6 %      RDW-SD 46.8 fl      MPV 9.6 fL      Platelets 334 10*3/mm3     POC Glucose Once [[id number]]  (Normal) Collected:  01/23/19 0230    Specimen:  Blood Updated:  01/23/19 0242     Glucose 106 mg/dL      Comment: : [id number] Chaparro Vallejo ID: [id number]       POC Glucose Once [[id number]]  (Normal) Collected:  01/23/19 0019    Specimen:  Blood Updated:  01/23/19 0029     Glucose 124 mg/dL      Comment: : [id number] Pratik AmandaMeter ID: [id number]       POC Glucose Once [[id number]]  (Abnormal) Collected:  01/22/19 2159    Specimen:  Blood Updated:  01/22/19 2210     Glucose 153 mg/dL      Comment: : [id number] Pratik HanleyMeter ID:  [id number]       POC Glucose Once [[id number]]  (Abnormal) Collected:  01/22/19 2010    Specimen:  Blood Updated:  01/22/19 2022     Glucose 177 mg/dL      Comment: : [id number] Pratik HanleyMeter ID: [id number]       POC Glucose Once [[id number]]  (Abnormal) Collected:  01/22/19 1911    Specimen:  Blood Updated:  01/22/19 1933     Glucose 206 mg/dL      Comment: : [id number] Pratik TravisandaMeter ID: [id number]       POC Glucose Once [[id number]]  (Abnormal) Collected:  01/22/19 1604    Specimen:  Blood Updated:  01/22/19 1615     Glucose 211 mg/dL      Comment: : [id number] Isaias Hanna ID: [id number]             Hospital Course:  The patient is a 58 y.o. male who presented to [facility] on 01/15/2019 as a transfer from [facility] due to respiratory failure.  Patient ultimately required intubation and mechanical ventilation.  Initial chest imaging revealed evidence of an abnormal left pleural complex with substantial consolidation involving the left lung, in addition to a small infiltrate involving the right lung.  Patient was admitted to the intensive care unit and had consultation with Dr. Oglesby of cardiothoracic surgery.  On 01/16/2019 patient had placement of a left thoracostomy tube.  After placement of the chest tube there was no evidence of an air leak and per records there was immediate drainage of a proximally 600 mL of serosanguineous fluid.  Patient was initially placed on broad antibiotic therapy with vancomycin, levofloxacin, in addition to cefepime.  Patient has a known history of insulin dependent diabetes and was placed on an insulin drip while in the intensive care unit for tight blood sugar control.  Blood cultures were obtained which revealed no growth to date.  Cultures of the pleural fluid also have revealed no growth.  MRSA nasal screen was negative.  Urine legionella and strep pneumo antigens were also negative.    Patient had some mild acute kidney injury  which responded to intravenous fluids during this hospitalization.  Pulmonary medicine was consulted and is also followed along with us during this hospitalization.  Unfortunately, we have had lack of improvement from a respiratory standpoint.  A repeat CT scan of the chest was performed on  with the following findings:  1. Complete left lower lobe collapse with partial left upper lobe  collapse, in part due to the adjacent multiloculated left pleural  effusion/empyema. A left chest tube is seen in the left lung base, with  the largest remaining component of the effusion/empyema now seen at the  left apex.  2. Dense consolidation in the dependent portions of the right upper and  right lower lobes with additional inflammatory nodularity throughout all  3 lobes on the right. The inflammatory nodularity in particular raises  suspicion for a developing infectious process in right lung.  3. Endotracheal tube and enteric tube appear in good position.    We have had decreased output from the left-sided thoracostomy tube despite ongoing therapy with Pulmozyme and alteplase.  Patient is remained on aggressive ventilator settings to maintain adequate oxygen saturation, and in fact at this time is on 14 of PEEP and 100% FiO2.  He is currently on antibiotic therapy with vancomycin, Levaquin, and Merrem, and again no culture data has revealed any significant findings that would assist us in better tailoring antimicrobial therapy.    Patient has been receiving tube feeds which he has tolerating without problem and with no significant residuals.    Our hope was to improve the patient's respiratory status unknown to a point where he would tolerate going to the operating room for surgery regarding the multi loculated left pleural effusion and empyema.  At this time the risk is too great at our facility to proceed with surgery especially in the setting of his current ventilator settings are  14 of PEEP and 100%  "FiO2.  I have had multiple conversations with CT surgery in addition to pulmonary medicine.  I have also spoken with the family on multiple occasions.  They do seem to understand the gravity and significance of his current medical condition, including the risks that are inherent with transferring him to a tertiary Medical Center in his current medical condition.    Family elected on 1/25/19 that they did not want any other procedures and did not want any more alteplase/pulmozyme.  They elected to withdrawal care, patient passed very quickly with family at bedside.  Patient was treated with fentanyl gtt for air hunger and discomfort.  TIME OF DEATH was 1400.      Physical Exam on Discharge:  /75   Pulse 120   Temp 99.4 °F (37.4 °C)   Resp 24   Ht 175.3 cm (69\")   Wt (!) 156 kg (343 lb 0.6 oz)   SpO2 (!) 88%   BMI 50.66 kg/m²    Physical Exam  Please see my progress note from today    Condition on Discharge:  Decreased      Tim Grey MD  01/24/19  2:55 PM    Time: 45 min        Electronically signed by Aniceto Ott MD at 1/25/2019  2:18 PM       "

## 2019-02-27 LAB — FUNGUS WND CULT: NORMAL

## 2019-05-23 NOTE — PLAN OF CARE
2116 Labs drawn  2120 accompanied pt to RR, urine sample obtained  2124 return to room     Mainor Leon, NICA-P  05/22/19 2127 Problem: Patient Care Overview  Goal: Plan of Care Review  Outcome: Ongoing (interventions implemented as appropriate)   01/21/19 1014   OTHER   Outcome Summary He remains on the vent RN just sedated pt.  PT completed PROM to B UE/LE x 10 reps. PT will continue to progress strengthening and command following. Family was educated on PROM.   Coping/Psychosocial   Plan of Care Reviewed With patient   Plan of Care Review   Progress improving
